# Patient Record
Sex: MALE | Race: OTHER | Employment: FULL TIME | URBAN - METROPOLITAN AREA
[De-identification: names, ages, dates, MRNs, and addresses within clinical notes are randomized per-mention and may not be internally consistent; named-entity substitution may affect disease eponyms.]

---

## 2020-07-20 ENCOUNTER — HOSPITAL ENCOUNTER (EMERGENCY)
Age: 70
Discharge: HOME | End: 2020-07-20
Attending: EMERGENCY MEDICINE | Admitting: EMERGENCY MEDICINE
Payer: MEDICARE

## 2020-07-20 ENCOUNTER — APPOINTMENT (OUTPATIENT)
Dept: GENERAL RADIOLOGY | Age: 70
End: 2020-07-20
Attending: PHYSICIAN ASSISTANT
Payer: MEDICARE

## 2020-07-20 VITALS
SYSTOLIC BLOOD PRESSURE: 127 MMHG | HEART RATE: 101 BPM | OXYGEN SATURATION: 97 % | DIASTOLIC BLOOD PRESSURE: 86 MMHG | RESPIRATION RATE: 15 BRPM | TEMPERATURE: 97.7 F

## 2020-07-20 DIAGNOSIS — S43.401A SPRAIN OF RIGHT SHOULDER, UNSPECIFIED SHOULDER SPRAIN TYPE, INITIAL ENCOUNTER: Primary | ICD-10-CM

## 2020-07-20 PROCEDURE — 99282 EMERGENCY DEPT VISIT SF MDM: CPT

## 2020-07-20 PROCEDURE — 73030 X-RAY EXAM OF SHOULDER: CPT

## 2020-07-20 RX ORDER — LIDOCAINE 50 MG/G
PATCH TOPICAL
Qty: 15 EACH | Refills: 0 | Status: SHIPPED | OUTPATIENT
Start: 2020-07-20 | End: 2020-09-22
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
## 2020-07-20 NOTE — DISCHARGE INSTRUCTIONS
Patient Education     Patient Education        Shoulder Sprain: Care Instructions  Your Care Instructions     A shoulder sprain occurs when you stretch or tear a ligament in your shoulder. Ligaments are tough tissues that connect one bone to another. A sprain can happen during sports, a fall, or projects around the house. Shoulder sprains usually get better with treatment at home. Follow-up care is a key part of your treatment and safety. Be sure to make and go to all appointments, and call your doctor if you are having problems. It's also a good idea to know your test results and keep a list of the medicines you take. How can you care for yourself at home? · Rest and protect your shoulder. Try to stop or reduce any action that causes pain. · If your doctor gave you a sling or immobilizer, wear it as directed. A sling or immobilizer supports your shoulder and may make you more comfortable. · Put ice or a cold pack on your shoulder for 10 to 20 minutes at a time. Try to do this every 1 to 2 hours for the next 3 days (when you are awake) or until the swelling goes down. Put a thin cloth between the ice and your skin. Some doctors suggest alternating between hot and cold. · Be safe with medicines. Read and follow all instructions on the label. ? If the doctor gave you a prescription medicine for pain, take it as prescribed. ? If you are not taking a prescription pain medicine, ask your doctor if you can take an over-the-counter medicine. · For the first day or two after an injury, avoid things that might increase swelling, such as hot showers, hot tubs, or hot packs. · After 2 or 3 days, if your swelling is gone, apply a heating pad set on low or a warm cloth to your shoulder. This helps keep your shoulder flexible. Some doctors suggest that you go back and forth between hot and cold. Put a thin cloth between the heating pad and your skin.   · Follow your doctor's or physical therapist's directions for exercises. · Return to your usual level of activity slowly. When should you call for help? Call your doctor now or seek immediate medical care if:  · Your pain is worse. · You cannot move your shoulder. · Your arm is cool or pale or changes color below the shoulder. · You have tingling, weakness, or numbness in your arm. Watch closely for changes in your health, and be sure to contact your doctor if:  · You do not get better as expected. Where can you learn more? Go to http://de-fabiana.info/  Enter P678 in the search box to learn more about \"Shoulder Sprain: Care Instructions. \"  Current as of: March 2, 2020               Content Version: 12.5  © 6389-9433 [employer]. Care instructions adapted under license by [employer] (which disclaims liability or warranty for this information). If you have questions about a medical condition or this instruction, always ask your healthcare professional. [address] any warranty or liability for your use of this information.
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
## 2020-09-02 ENCOUNTER — APPOINTMENT (OUTPATIENT)
Dept: GENERAL RADIOLOGY | Age: 70
DRG: 281 | End: 2020-09-02
Attending: EMERGENCY MEDICINE
Payer: MEDICARE

## 2020-09-02 ENCOUNTER — HOSPITAL ENCOUNTER (INPATIENT)
Age: 70
LOS: 1 days | Discharge: HOME | DRG: 281 | End: 2020-09-04
Attending: EMERGENCY MEDICINE | Admitting: INTERNAL MEDICINE
Payer: MEDICARE

## 2020-09-02 DIAGNOSIS — I21.4 NSTEMI (NON-ST ELEVATED MYOCARDIAL INFARCTION) (HCC): Primary | ICD-10-CM

## 2020-09-02 PROBLEM — R77.8 ELEVATED TROPONIN: Status: ACTIVE | Noted: 2020-09-02

## 2020-09-02 LAB
ALBUMIN SERPL-MCNC: 3.6 G/DL (ref 3.4–5)
ALBUMIN/GLOB SERPL: 1.1 {RATIO} (ref 0.8–1.7)
ALP SERPL-CCNC: 66 U/L (ref 45–117)
ALT SERPL-CCNC: 31 U/L (ref 16–61)
ANION GAP SERPL CALC-SCNC: 5 MMOL/L (ref 3–18)
AST SERPL-CCNC: 17 U/L (ref 10–38)
BASOPHILS # BLD: 0 K/UL (ref 0–0.1)
BASOPHILS NFR BLD: 1 % (ref 0–2)
BILIRUB SERPL-MCNC: 0.4 MG/DL (ref 0.2–1)
BNP SERPL-MCNC: 61 PG/ML (ref 0–900)
BUN SERPL-MCNC: 18 MG/DL (ref 7–18)
BUN/CREAT SERPL: 16 (ref 12–20)
CALCIUM SERPL-MCNC: 8.5 MG/DL (ref 8.5–10.1)
CHLORIDE SERPL-SCNC: 107 MMOL/L (ref 100–111)
CO2 SERPL-SCNC: 28 MMOL/L (ref 21–32)
CREAT SERPL-MCNC: 1.13 MG/DL (ref 0.6–1.3)
DIFFERENTIAL METHOD BLD: ABNORMAL
EOSINOPHIL # BLD: 0.2 K/UL (ref 0–0.4)
EOSINOPHIL NFR BLD: 5 % (ref 0–5)
ERYTHROCYTE [DISTWIDTH] IN BLOOD BY AUTOMATED COUNT: 13.5 % (ref 11.6–14.5)
GLOBULIN SER CALC-MCNC: 3.2 G/DL (ref 2–4)
GLUCOSE SERPL-MCNC: 180 MG/DL (ref 74–99)
HCT VFR BLD AUTO: 41 % (ref 36–48)
HGB BLD-MCNC: 13.8 G/DL (ref 13–16)
LIPASE SERPL-CCNC: 159 U/L (ref 73–393)
LYMPHOCYTES # BLD: 1.5 K/UL (ref 0.9–3.6)
LYMPHOCYTES NFR BLD: 30 % (ref 21–52)
MAGNESIUM SERPL-MCNC: 2.2 MG/DL (ref 1.6–2.6)
MCH RBC QN AUTO: 31.8 PG (ref 24–34)
MCHC RBC AUTO-ENTMCNC: 33.7 G/DL (ref 31–37)
MCV RBC AUTO: 94.5 FL (ref 74–97)
MONOCYTES # BLD: 0.4 K/UL (ref 0.05–1.2)
MONOCYTES NFR BLD: 7 % (ref 3–10)
NEUTS SEG # BLD: 2.9 K/UL (ref 1.8–8)
NEUTS SEG NFR BLD: 57 % (ref 40–73)
PLATELET # BLD AUTO: 156 K/UL (ref 135–420)
PMV BLD AUTO: 9.5 FL (ref 9.2–11.8)
POTASSIUM SERPL-SCNC: 3.9 MMOL/L (ref 3.5–5.5)
PROT SERPL-MCNC: 6.8 G/DL (ref 6.4–8.2)
RBC # BLD AUTO: 4.34 M/UL (ref 4.7–5.5)
SODIUM SERPL-SCNC: 140 MMOL/L (ref 136–145)
TROPONIN I SERPL-MCNC: 0.05 NG/ML (ref 0–0.04)
TROPONIN I SERPL-MCNC: 0.21 NG/ML (ref 0–0.04)
TROPONIN I SERPL-MCNC: <0.02 NG/ML (ref 0–0.04)
WBC # BLD AUTO: 5 K/UL (ref 4.6–13.2)

## 2020-09-02 PROCEDURE — 99285 EMERGENCY DEPT VISIT HI MDM: CPT

## 2020-09-02 PROCEDURE — 93005 ELECTROCARDIOGRAM TRACING: CPT

## 2020-09-02 PROCEDURE — 83690 ASSAY OF LIPASE: CPT

## 2020-09-02 PROCEDURE — 99218 HC RM OBSERVATION: CPT

## 2020-09-02 PROCEDURE — 71045 X-RAY EXAM CHEST 1 VIEW: CPT

## 2020-09-02 PROCEDURE — 74011250636 HC RX REV CODE- 250/636: Performed by: EMERGENCY MEDICINE

## 2020-09-02 PROCEDURE — 77030021352 HC CBL LD SYS DISP COVD -B

## 2020-09-02 PROCEDURE — 85025 COMPLETE CBC W/AUTO DIFF WBC: CPT

## 2020-09-02 PROCEDURE — 83880 ASSAY OF NATRIURETIC PEPTIDE: CPT

## 2020-09-02 PROCEDURE — 83735 ASSAY OF MAGNESIUM: CPT

## 2020-09-02 PROCEDURE — 74011250637 HC RX REV CODE- 250/637: Performed by: EMERGENCY MEDICINE

## 2020-09-02 PROCEDURE — 80053 COMPREHEN METABOLIC PANEL: CPT

## 2020-09-02 PROCEDURE — 96372 THER/PROPH/DIAG INJ SC/IM: CPT

## 2020-09-02 PROCEDURE — 84484 ASSAY OF TROPONIN QUANT: CPT

## 2020-09-02 RX ORDER — ACETAMINOPHEN 325 MG/1
650 TABLET ORAL
Status: DISCONTINUED | OUTPATIENT
Start: 2020-09-02 | End: 2020-09-04 | Stop reason: HOSPADM

## 2020-09-02 RX ORDER — ASPIRIN 325 MG
325 TABLET ORAL
Status: COMPLETED | OUTPATIENT
Start: 2020-09-02 | End: 2020-09-02

## 2020-09-02 RX ORDER — ONDANSETRON 2 MG/ML
4 INJECTION INTRAMUSCULAR; INTRAVENOUS
Status: DISCONTINUED | OUTPATIENT
Start: 2020-09-02 | End: 2020-09-04 | Stop reason: HOSPADM

## 2020-09-02 RX ORDER — LANOLIN ALCOHOL/MO/W.PET/CERES
12 CREAM (GRAM) TOPICAL
Status: DISCONTINUED | OUTPATIENT
Start: 2020-09-02 | End: 2020-09-04 | Stop reason: HOSPADM

## 2020-09-02 RX ORDER — SODIUM CHLORIDE 0.9 % (FLUSH) 0.9 %
5-40 SYRINGE (ML) INJECTION AS NEEDED
Status: DISCONTINUED | OUTPATIENT
Start: 2020-09-02 | End: 2020-09-04 | Stop reason: HOSPADM

## 2020-09-02 RX ORDER — MAGNESIUM SULFATE 100 %
4 CRYSTALS MISCELLANEOUS AS NEEDED
Status: DISCONTINUED | OUTPATIENT
Start: 2020-09-02 | End: 2020-09-04 | Stop reason: HOSPADM

## 2020-09-02 RX ORDER — LISINOPRIL 5 MG/1
5 TABLET ORAL DAILY
Status: DISCONTINUED | OUTPATIENT
Start: 2020-09-03 | End: 2020-09-03

## 2020-09-02 RX ORDER — INSULIN LISPRO 100 [IU]/ML
INJECTION, SOLUTION INTRAVENOUS; SUBCUTANEOUS
Status: DISCONTINUED | OUTPATIENT
Start: 2020-09-03 | End: 2020-09-04 | Stop reason: HOSPADM

## 2020-09-02 RX ORDER — IPRATROPIUM BROMIDE AND ALBUTEROL SULFATE 2.5; .5 MG/3ML; MG/3ML
3 SOLUTION RESPIRATORY (INHALATION)
Status: DISCONTINUED | OUTPATIENT
Start: 2020-09-02 | End: 2020-09-04 | Stop reason: HOSPADM

## 2020-09-02 RX ORDER — ATORVASTATIN CALCIUM 20 MG/1
80 TABLET, FILM COATED ORAL
Status: DISCONTINUED | OUTPATIENT
Start: 2020-09-02 | End: 2020-09-04 | Stop reason: HOSPADM

## 2020-09-02 RX ORDER — PANTOPRAZOLE SODIUM 40 MG/10ML
40 INJECTION, POWDER, LYOPHILIZED, FOR SOLUTION INTRAVENOUS DAILY PRN
Status: DISCONTINUED | OUTPATIENT
Start: 2020-09-02 | End: 2020-09-04 | Stop reason: HOSPADM

## 2020-09-02 RX ORDER — METOPROLOL TARTRATE 25 MG/1
12.5 TABLET, FILM COATED ORAL ONCE
Status: DISPENSED | OUTPATIENT
Start: 2020-09-02 | End: 2020-09-03

## 2020-09-02 RX ORDER — DOCUSATE SODIUM 100 MG/1
100 CAPSULE, LIQUID FILLED ORAL
Status: DISCONTINUED | OUTPATIENT
Start: 2020-09-02 | End: 2020-09-04 | Stop reason: HOSPADM

## 2020-09-02 RX ORDER — ENOXAPARIN SODIUM 100 MG/ML
1 INJECTION SUBCUTANEOUS
Status: COMPLETED | OUTPATIENT
Start: 2020-09-02 | End: 2020-09-02

## 2020-09-02 RX ORDER — DEXTROSE MONOHYDRATE 25 G/50ML
25-50 INJECTION, SOLUTION INTRAVENOUS AS NEEDED
Status: DISCONTINUED | OUTPATIENT
Start: 2020-09-02 | End: 2020-09-04 | Stop reason: HOSPADM

## 2020-09-02 RX ORDER — GUAIFENESIN 100 MG/5ML
81 LIQUID (ML) ORAL DAILY
Status: DISCONTINUED | OUTPATIENT
Start: 2020-09-03 | End: 2020-09-04 | Stop reason: HOSPADM

## 2020-09-02 RX ORDER — SODIUM CHLORIDE 0.9 % (FLUSH) 0.9 %
5-40 SYRINGE (ML) INJECTION EVERY 8 HOURS
Status: DISCONTINUED | OUTPATIENT
Start: 2020-09-02 | End: 2020-09-04 | Stop reason: HOSPADM

## 2020-09-02 RX ORDER — LISINOPRIL AND HYDROCHLOROTHIAZIDE 20; 25 MG/1; MG/1
1 TABLET ORAL DAILY
COMMUNITY
End: 2020-09-22

## 2020-09-02 RX ADMIN — ENOXAPARIN SODIUM 80 MG: 80 INJECTION SUBCUTANEOUS at 18:30

## 2020-09-02 RX ADMIN — Medication 10 ML: at 23:09

## 2020-09-02 RX ADMIN — ASPIRIN 325 MG ORAL TABLET 325 MG: 325 PILL ORAL at 18:30

## 2020-09-02 NOTE — Clinical Note
TRANSFER - IN REPORT:     Verbal report received from: N/A. Report consisted of patient's Situation, Background, Assessment and   Recommendations(SBAR). Opportunity for questions and clarification was provided. Assessment completed upon patient's arrival to unit and care assumed. Patient transported with a Cardiac Cath Tech / Patient Care Tech.

## 2020-09-02 NOTE — ED TRIAGE NOTES
Pt arrived complaining of chest pain and clamminess that started 20 mins prior to arrival. Pt denies nay shortness of breath. Pt also complains of right shoulder uvaldo.

## 2020-09-02 NOTE — Clinical Note
Contrast Dose Calculator:   Patient's age: 72.   Patient's sex: Male. Patient weight (kg) = 73.9. Creatinine level (mg/dL) = 0.9. Creatinine clearance (mL/min): 80.97. Contrast concentration (mg/mL) = 300. MACD = 300 mL. Max Contrast dose per Creatinine Cl calculator = 182.18 mL.

## 2020-09-02 NOTE — ED NOTES
Pt ambulated to bathroom. Pt has been informed of plan of care which includes being admitted. Pt given phone to call significant other. No complaints at this time.

## 2020-09-02 NOTE — Clinical Note
TRANSFER - OUT REPORT:     Verbal report given to: Adolfo Prado PACU RN. Report consisted of patient's Situation, Background, Assessment and   Recommendations(SBAR). Opportunity for questions and clarification was provided. Patient transported with a Cardiac Cath Tech / Patient Care Tech. Patient transported to: PACU.

## 2020-09-02 NOTE — ED PROVIDER NOTES
[address] [facility]  EMERGENCY DEPARTMENT HISTORY AND PHYSICAL EXAM       Date: 9/2/2020   Patient Name: Adam Mckee   YOB: 1950  Medical Record Number: [id number]    HISTORY OF PRESENTING ILLNESS:     Adam Mckee is a 72 y.o. male presenting with the noted PMH to the ED c/o left-sided chest pain. Scribes as being aching sensation. Lasting for about 20 minutes. Has completely resolved now. Associated with diaphoresis and nausea. Which is also resolved. He states he was walking when it occurred. He states he has history of anxiety and takes BuSpar. He states he feels similar. Has not seen a heart doctor before. Has not had a stress test before. Denies any cough or cold symptoms. Denies any fevers or chills. Denies abdominal pain. Denies any urine or bowel changes. He states he has had diarrhea off and on for the last month but takes Imodium and it goes away. Rest of systems reviewed are negative. Denies any recent travel or sick contacts. He states his father and uncle passed away in their mid 76s from CHF. No other family history. Primary Care Provider: None   Specialist:    Past Medical History:   Past Medical History:   Diagnosis Date    Hypertension         Past Surgical History:   History reviewed. No pertinent surgical history. Social History:   Social History     Tobacco Use    Smoking status: Never Smoker    Smokeless tobacco: Never Used   Substance Use Topics    Alcohol use: Yes     Alcohol/week: 20.0 standard drinks     Types: 20 Shots of liquor per week    Drug use: Not on file        Allergies:   No Known Allergies     REVIEW OF SYSTEMS:  Review of Systems      PHYSICAL EXAM:  Vitals:    09/02/20 1700 09/02/20 1715 09/02/20 1730 09/02/20 1745   BP: 129/70 107/70 113/71 127/67   Pulse: 82 79 79 78   Resp: 17 15 16 13   Temp:       SpO2: 97% 97% 97% 97%       Physical Exam   Vital signs reviewed. Alert oriented x 3 in NAD.   HEENT: normocephalic atraumatic. Eyes are PERRLA EOMI. Conjunctiva normal.    External ears and nose normal.    Neck: normal external exam. No midline neck or back TTP. Lungs are clear to ascultation bilaterally. normal effort  Heart is regular rate and rhythm with MARIAM murmur 2/6. Abdomen soft and nontender. No rebound rigidity or guarding. Extremities: Moves all 4 extremities and no distress. Full range of motion. 2+ pulses and BCR in all 4 extremities. No edema or calf tenderness. Neuro: Normal gait. 5 out of 5 strength in all 4 extremities. No facial droop. Skin examination: intact. no rashes. No petechia or purpura. Medications   aspirin tablet 325 mg (has no administration in time range)   enoxaparin (LOVENOX) injection 80 mg (has no administration in time range)       RESULTS:    Labs -   Labs Reviewed   CBC WITH AUTOMATED DIFF - Abnormal; Notable for the following components:       Result Value    RBC 4.34 (*)     All other components within normal limits   METABOLIC PANEL, COMPREHENSIVE - Abnormal; Notable for the following components:    Glucose 180 (*)     All other components within normal limits   TROPONIN I - Abnormal; Notable for the following components:    Troponin-I, QT 0.05 (*)     All other components within normal limits   NT-PRO BNP   TROPONIN I   LIPASE   MAGNESIUM       Radiologic Studies -  Xr Chest Port    Result Date: 9/2/2020  EXAM: XR CHEST PORT CLINICAL INDICATION/HISTORY: pain -Additional: None COMPARISON: 03/11/2009 TECHNIQUE: Frontal view of the chest _______________ FINDINGS: HEART AND MEDIASTINUM: Midline cardiac silhouette, normal in size. Unremarkable hilar vascular structures. LUNGS AND PLEURAL SPACES: No focal consolidation, parenchymal opacity. No pneumothorax or pleural effusion. BONY THORAX AND SOFT TISSUES: No acute or destructive osseous abnormality. _______________     IMPRESSION: 1. No acute cardiopulmonary process.        EKG interpretation:   Done at 1414 for myself is normal sinus rhythm at 100 bpm.  Right bundle branch block. No ST elevation. MEDICAL DECISION MAKING    Patient has been chest pain-free however has only been 4 hours since pain onset. Patient swelling elevation of troponin here. Patient with a heart score of 4. Moderate risk. With elevated troponins. Will admit patient for further evaluation and care. Patient also has a heart murmur. Has not been told that before. Calling cardiology to see patient as well. Patient agrees with plan. No evidence of pneumonia or pneumothorax. No evidence of dissection. No evidence of DVT or PE. No leg pain or swelling. Will admit for further evaluation and care.    1530. Patient reassessed. States he still pain-free. States he still thinks this is anxiety. He states his been having a little since he was 40years old. He states been having it more frequently recently. Heart score of 4. Moderate risk. Patient declines coming into the hospital here but does agree to getting a second troponin. 1759. Patient reassessed. Still pain-free. However patient's troponin did slightly elevate. Has only been 4 hours since his pain occurred. Patient denies any bleeding history. No evidence or history of hematochezia hematemesis hematuria hemoptysis or intracranial bleed. Agrees to being admitted to the hospital here.    1800. Paging hospitalist and cardiology. Diagnosis   Clinical Impression:   1. NSTEMI (non-ST elevated myocardial infarction) ([facility] [address]    Hypertension  Hyperlipidemia  Previous tobacco history  Occasional marijuana use      Admitted in stable and improved condition. This chart was completed using Dragon, a dictation transcription service. Errors may have resulted from using this device.      Critical Care Time:  The services I provided to this patient were to treat and/or prevent clinically significant deterioration that could result in the failure of one or more body systems and/or organ systems. Services included the following:  -reviewing nursing notes and old charts  -vital sign assessments  -direct patient care  -medication orders and management  -interpreting and reviewing diagnostic studies/labs  -re-evaluations  -documentation time    Aggregate critical care time was 37 minutes, which includes only time during which I was engaged in work directly related to the patient's care as described above, whether I was at bedside or elsewhere in the Emergency Department. It did not include time spent performing other reported procedures or the services of residents, students, nurses, or advance practice providers.

## 2020-09-02 NOTE — ED NOTES
Bedside and Verbal shift change report given to Leesa Villalobos (oncoming nurse) by Doreen Valera (offgoing nurse). Report included the following information SBAR, ED Summary, MAR, Recent Results and Cardiac Rhythm NSR.

## 2020-09-03 ENCOUNTER — APPOINTMENT (OUTPATIENT)
Dept: NON INVASIVE DIAGNOSTICS | Age: 70
DRG: 281 | End: 2020-09-03
Attending: INTERNAL MEDICINE
Payer: MEDICARE

## 2020-09-03 ENCOUNTER — APPOINTMENT (OUTPATIENT)
Dept: NON INVASIVE DIAGNOSTICS | Age: 70
DRG: 281 | End: 2020-09-03
Attending: HOSPITALIST
Payer: MEDICARE

## 2020-09-03 ENCOUNTER — HOSPITAL ENCOUNTER (OUTPATIENT)
Dept: NUCLEAR MEDICINE | Age: 70
Discharge: HOME | DRG: 281 | End: 2020-09-03
Attending: HOSPITALIST
Payer: MEDICARE

## 2020-09-03 PROBLEM — F41.1 GENERALIZED ANXIETY DISORDER WITH PANIC ATTACKS: Status: ACTIVE | Noted: 2020-09-03

## 2020-09-03 PROBLEM — I10 HTN (HYPERTENSION): Status: ACTIVE | Noted: 2020-09-03

## 2020-09-03 PROBLEM — E11.9 TYPE II DIABETES MELLITUS (HCC): Status: ACTIVE | Noted: 2020-09-03

## 2020-09-03 PROBLEM — Z87.891 FORMER SMOKER: Status: ACTIVE | Noted: 2020-09-03

## 2020-09-03 PROBLEM — F10.10 ETOH ABUSE: Status: ACTIVE | Noted: 2020-09-03

## 2020-09-03 PROBLEM — G47.00 INSOMNIA: Status: ACTIVE | Noted: 2020-09-03

## 2020-09-03 PROBLEM — F41.0 GENERALIZED ANXIETY DISORDER WITH PANIC ATTACKS: Status: ACTIVE | Noted: 2020-09-03

## 2020-09-03 PROBLEM — E78.5 HLD (HYPERLIPIDEMIA): Status: ACTIVE | Noted: 2020-09-03

## 2020-09-03 PROBLEM — J45.909 ASTHMA: Status: ACTIVE | Noted: 2020-09-03

## 2020-09-03 PROBLEM — Z78.9 STATIN INTOLERANCE: Status: ACTIVE | Noted: 2020-09-03

## 2020-09-03 LAB
ALBUMIN SERPL-MCNC: 3.6 G/DL (ref 3.4–5)
ALBUMIN/GLOB SERPL: 1.3 {RATIO} (ref 0.8–1.7)
ALP SERPL-CCNC: 63 U/L (ref 45–117)
ALT SERPL-CCNC: 28 U/L (ref 16–61)
ANION GAP SERPL CALC-SCNC: 3 MMOL/L (ref 3–18)
AST SERPL-CCNC: 18 U/L (ref 10–38)
BASOPHILS # BLD: 0 K/UL (ref 0–0.1)
BASOPHILS NFR BLD: 1 % (ref 0–2)
BILIRUB SERPL-MCNC: 0.5 MG/DL (ref 0.2–1)
BUN SERPL-MCNC: 17 MG/DL (ref 7–18)
BUN/CREAT SERPL: 19 (ref 12–20)
CALCIUM SERPL-MCNC: 8.4 MG/DL (ref 8.5–10.1)
CHLORIDE SERPL-SCNC: 107 MMOL/L (ref 100–111)
CHOLEST SERPL-MCNC: 192 MG/DL
CO2 SERPL-SCNC: 31 MMOL/L (ref 21–32)
CREAT SERPL-MCNC: 0.9 MG/DL (ref 0.6–1.3)
DIFFERENTIAL METHOD BLD: ABNORMAL
ECHO AO ASC DIAM: 3.38 CM
ECHO AO ROOT DIAM: 3.69 CM
ECHO AV AREA PEAK VELOCITY: 2.23 CM2
ECHO AV AREA VTI: 2.08 CM2
ECHO AV AREA/BSA PEAK VELOCITY: 1.2 CM2/M2
ECHO AV AREA/BSA VTI: 1.1 CM2/M2
ECHO AV MEAN GRADIENT: 5.48 MMHG
ECHO AV PEAK GRADIENT: 9.37 MMHG
ECHO AV PEAK VELOCITY: 153.06 CM/S
ECHO AV VTI: 31.04 CM
ECHO IVC PROX: 2.2 CM
ECHO LA MAJOR AXIS: 2.97 CM
ECHO LA MINOR AXIS: 1.61 CM
ECHO LV EDV A2C: 80.12 ML
ECHO LV EDV A4C: 57.18 ML
ECHO LV EDV BP: 69.58 ML (ref 67–155)
ECHO LV EDV INDEX A4C: 31.1 ML/M2
ECHO LV EDV INDEX BP: 37.8 ML/M2
ECHO LV EDV NDEX A2C: 43.6 ML/M2
ECHO LV EJECTION FRACTION A2C: 61 PERCENT
ECHO LV EJECTION FRACTION A4C: 67 PERCENT
ECHO LV EJECTION FRACTION BIPLANE: 64.4 PERCENT (ref 55–100)
ECHO LV ESV A2C: 31.01 ML
ECHO LV ESV A4C: 19.08 ML
ECHO LV ESV BP: 24.77 ML (ref 22–58)
ECHO LV ESV INDEX A2C: 16.9 ML/M2
ECHO LV ESV INDEX A4C: 10.4 ML/M2
ECHO LV ESV INDEX BP: 13.5 ML/M2
ECHO LV INTERNAL DIMENSION DIASTOLIC: 4.11 CM (ref 4.2–5.9)
ECHO LV INTERNAL DIMENSION SYSTOLIC: 3.67 CM
ECHO LV IVSD: 1.05 CM (ref 0.6–1)
ECHO LV MASS 2D: 129.8 G (ref 88–224)
ECHO LV MASS INDEX 2D: 70.6 G/M2 (ref 49–115)
ECHO LV POSTERIOR WALL DIASTOLIC: 0.92 CM (ref 0.6–1)
ECHO LVOT CARDIAC OUTPUT: 13.4 LITER/MINUTE
ECHO LVOT DIAM: 1.98 CM
ECHO LVOT PEAK GRADIENT: 4.87 MMHG
ECHO LVOT PEAK VELOCITY: 110.32 CM/S
ECHO LVOT SV: 64.4 ML
ECHO LVOT VTI: 20.84 CM
ECHO MV A VELOCITY: 85.65 CM/S
ECHO MV E DECELERATION TIME (DT): 0.24 S
ECHO MV E VELOCITY: 68.9 CM/S
ECHO MV E/A RATIO: 0.8
ECHO TV REGURGITANT MAX VELOCITY: 213.79 CM/S
ECHO TV REGURGITANT PEAK GRADIENT: 18.28 MMHG
EOSINOPHIL # BLD: 0.2 K/UL (ref 0–0.4)
EOSINOPHIL NFR BLD: 4 % (ref 0–5)
ERYTHROCYTE [DISTWIDTH] IN BLOOD BY AUTOMATED COUNT: 13.5 % (ref 11.6–14.5)
EST. AVERAGE GLUCOSE BLD GHB EST-MCNC: 157 MG/DL
GLOBULIN SER CALC-MCNC: 2.8 G/DL (ref 2–4)
GLUCOSE BLD STRIP.AUTO-MCNC: 103 MG/DL (ref 70–110)
GLUCOSE BLD STRIP.AUTO-MCNC: 159 MG/DL (ref 70–110)
GLUCOSE BLD STRIP.AUTO-MCNC: 180 MG/DL (ref 70–110)
GLUCOSE SERPL-MCNC: 96 MG/DL (ref 74–99)
HBA1C MFR BLD: 7.1 % (ref 4.2–5.6)
HCT VFR BLD AUTO: 40.5 % (ref 36–48)
HDLC SERPL-MCNC: 56 MG/DL (ref 40–60)
HDLC SERPL: 3.4 {RATIO} (ref 0–5)
HGB BLD-MCNC: 13.6 G/DL (ref 13–16)
LDLC SERPL CALC-MCNC: 86.2 MG/DL (ref 0–100)
LIPID PROFILE,FLP: ABNORMAL
LVOT MG: 2.34 MMHG
LYMPHOCYTES # BLD: 1.4 K/UL (ref 0.9–3.6)
LYMPHOCYTES NFR BLD: 26 % (ref 21–52)
MCH RBC QN AUTO: 32 PG (ref 24–34)
MCHC RBC AUTO-ENTMCNC: 33.6 G/DL (ref 31–37)
MCV RBC AUTO: 95.3 FL (ref 74–97)
MONOCYTES # BLD: 0.5 K/UL (ref 0.05–1.2)
MONOCYTES NFR BLD: 9 % (ref 3–10)
NEUTS SEG # BLD: 3.3 K/UL (ref 1.8–8)
NEUTS SEG NFR BLD: 60 % (ref 40–73)
PLATELET # BLD AUTO: 155 K/UL (ref 135–420)
PMV BLD AUTO: 10 FL (ref 9.2–11.8)
POTASSIUM SERPL-SCNC: 3.9 MMOL/L (ref 3.5–5.5)
PROT SERPL-MCNC: 6.4 G/DL (ref 6.4–8.2)
RBC # BLD AUTO: 4.25 M/UL (ref 4.7–5.5)
SODIUM SERPL-SCNC: 141 MMOL/L (ref 136–145)
STRESS BASELINE HR: 68 BPM
STRESS ESTIMATED WORKLOAD: 1 METS
STRESS EXERCISE DUR MIN: NORMAL
STRESS PEAK DIAS BP: 106 MMHG
STRESS PEAK SYS BP: 160 MMHG
STRESS PERCENT HR ACHIEVED: 80 %
STRESS POST PEAK HR: 121 BPM
STRESS RATE PRESSURE PRODUCT: NORMAL BPM*MMHG
STRESS TARGET HR: 151 BPM
TRIGL SERPL-MCNC: 249 MG/DL (ref ?–150)
TROPONIN I SERPL-MCNC: 0.12 NG/ML (ref 0–0.04)
TSH SERPL DL<=0.05 MIU/L-ACNC: 1.23 UIU/ML (ref 0.36–3.74)
VLDLC SERPL CALC-MCNC: 49.8 MG/DL
WBC # BLD AUTO: 5.4 K/UL (ref 4.6–13.2)

## 2020-09-03 PROCEDURE — 99218 HC RM OBSERVATION: CPT

## 2020-09-03 PROCEDURE — 82962 GLUCOSE BLOOD TEST: CPT

## 2020-09-03 PROCEDURE — 74011250637 HC RX REV CODE- 250/637: Performed by: INTERNAL MEDICINE

## 2020-09-03 PROCEDURE — 80053 COMPREHEN METABOLIC PANEL: CPT

## 2020-09-03 PROCEDURE — 74011250636 HC RX REV CODE- 250/636: Performed by: HOSPITALIST

## 2020-09-03 PROCEDURE — 74011000250 HC RX REV CODE- 250: Performed by: HOSPITALIST

## 2020-09-03 PROCEDURE — 85025 COMPLETE CBC W/AUTO DIFF WBC: CPT

## 2020-09-03 PROCEDURE — 84484 ASSAY OF TROPONIN QUANT: CPT

## 2020-09-03 PROCEDURE — 93017 CV STRESS TEST TRACING ONLY: CPT

## 2020-09-03 PROCEDURE — A9500 TC99M SESTAMIBI: HCPCS

## 2020-09-03 PROCEDURE — 83036 HEMOGLOBIN GLYCOSYLATED A1C: CPT

## 2020-09-03 PROCEDURE — 96374 THER/PROPH/DIAG INJ IV PUSH: CPT

## 2020-09-03 PROCEDURE — 36415 COLL VENOUS BLD VENIPUNCTURE: CPT

## 2020-09-03 PROCEDURE — 80061 LIPID PANEL: CPT

## 2020-09-03 PROCEDURE — 74011250636 HC RX REV CODE- 250/636: Performed by: INTERNAL MEDICINE

## 2020-09-03 PROCEDURE — C8929 TTE W OR WO FOL WCON,DOPPLER: HCPCS

## 2020-09-03 PROCEDURE — 84443 ASSAY THYROID STIM HORMONE: CPT

## 2020-09-03 PROCEDURE — 74011250637 HC RX REV CODE- 250/637: Performed by: HOSPITALIST

## 2020-09-03 RX ORDER — TRAMADOL HYDROCHLORIDE 50 MG/1
50 TABLET ORAL
Status: DISCONTINUED | OUTPATIENT
Start: 2020-09-03 | End: 2020-09-04 | Stop reason: HOSPADM

## 2020-09-03 RX ORDER — BUSPIRONE HYDROCHLORIDE 7.5 MG/1
15 TABLET ORAL 2 TIMES DAILY
Status: DISCONTINUED | OUTPATIENT
Start: 2020-09-03 | End: 2020-09-04 | Stop reason: HOSPADM

## 2020-09-03 RX ORDER — MAG HYDROX/ALUMINUM HYD/SIMETH 200-200-20
30 SUSPENSION, ORAL (FINAL DOSE FORM) ORAL
Status: DISCONTINUED | OUTPATIENT
Start: 2020-09-03 | End: 2020-09-04 | Stop reason: HOSPADM

## 2020-09-03 RX ORDER — ONDANSETRON 2 MG/ML
4 INJECTION INTRAMUSCULAR; INTRAVENOUS ONCE
Status: COMPLETED | OUTPATIENT
Start: 2020-09-03 | End: 2020-09-03

## 2020-09-03 RX ORDER — HYDROCHLOROTHIAZIDE 25 MG/1
25 TABLET ORAL DAILY
Status: DISCONTINUED | OUTPATIENT
Start: 2020-09-03 | End: 2020-09-04

## 2020-09-03 RX ORDER — TRAMADOL HYDROCHLORIDE 50 MG/1
50 TABLET ORAL
Status: COMPLETED | OUTPATIENT
Start: 2020-09-03 | End: 2020-09-03

## 2020-09-03 RX ORDER — LISINOPRIL 20 MG/1
20 TABLET ORAL DAILY
Status: DISCONTINUED | OUTPATIENT
Start: 2020-09-03 | End: 2020-09-04

## 2020-09-03 RX ORDER — ZOLPIDEM TARTRATE 5 MG/1
5 TABLET ORAL
Status: DISCONTINUED | OUTPATIENT
Start: 2020-09-03 | End: 2020-09-04 | Stop reason: HOSPADM

## 2020-09-03 RX ADMIN — ZOLPIDEM TARTRATE 5 MG: 5 TABLET ORAL at 22:35

## 2020-09-03 RX ADMIN — BUSPIRONE HYDROCHLORIDE 15 MG: 7.5 TABLET ORAL at 17:26

## 2020-09-03 RX ADMIN — BUSPIRONE HYDROCHLORIDE 15 MG: 7.5 TABLET ORAL at 08:35

## 2020-09-03 RX ADMIN — MELATONIN 12 MG: at 03:27

## 2020-09-03 RX ADMIN — REGADENOSON 0.4 MG: 0.08 INJECTION, SOLUTION INTRAVENOUS at 12:28

## 2020-09-03 RX ADMIN — Medication 10 ML: at 22:36

## 2020-09-03 RX ADMIN — HYDROCHLOROTHIAZIDE 25 MG: 25 TABLET ORAL at 08:35

## 2020-09-03 RX ADMIN — ASPIRIN 81 MG CHEWABLE TABLET 81 MG: 81 TABLET CHEWABLE at 08:35

## 2020-09-03 RX ADMIN — ZOLPIDEM TARTRATE 5 MG: 5 TABLET ORAL at 03:27

## 2020-09-03 RX ADMIN — Medication 10 ML: at 16:06

## 2020-09-03 RX ADMIN — BUSPIRONE HYDROCHLORIDE 15 MG: 7.5 TABLET ORAL at 03:27

## 2020-09-03 RX ADMIN — LISINOPRIL 20 MG: 20 TABLET ORAL at 08:35

## 2020-09-03 RX ADMIN — TRAMADOL HYDROCHLORIDE 50 MG: 50 TABLET, FILM COATED ORAL at 03:27

## 2020-09-03 RX ADMIN — ALUMINUM HYDROXIDE, MAGNESIUM HYDROXIDE, AND SIMETHICONE 30 ML: 200; 200; 20 SUSPENSION ORAL at 16:06

## 2020-09-03 RX ADMIN — ONDANSETRON 4 MG: 2 INJECTION INTRAMUSCULAR; INTRAVENOUS at 11:16

## 2020-09-03 RX ADMIN — Medication 10 ML: at 06:00

## 2020-09-03 RX ADMIN — TRAMADOL HYDROCHLORIDE 50 MG: 50 TABLET, FILM COATED ORAL at 16:06

## 2020-09-03 RX ADMIN — PERFLUTREN 1 ML: 6.52 INJECTION, SUSPENSION INTRAVENOUS at 10:03

## 2020-09-03 NOTE — PROGRESS NOTES
Received patient from [employer]. Pt awake and in bed. Denies pain. Bed locked in lowest position and call light within reach. 0019: Reassessment, No changes    0434 Reassessment, No changes    Uneventful night for pt. Pt rested and was treated per eMAR. No further complaints and no distress noted. Bedside shift change report given to [facility] (oncoming nurse) by Omar Tariq RN (offgoing nurse). Report included the following information SBAR, Intake/Output, MAR and Quality Measures. Opportunity for questions and clarification provided.

## 2020-09-03 NOTE — ED NOTES
TRANSFER - OUT REPORT:    Verbal report given to Mona RN(name) on [facility] Space  being transferred to (unit) for routine progression of care       Report consisted of patients Situation, Background, Assessment and   Recommendations(SBAR). Information from the following report(s) SBAR, MAR and Cardiac Rhythm sr was reviewed with the receiving nurse. Lines:   Peripheral IV 09/02/20 Left Hand (Active)   Site Assessment Clean, dry, & intact 09/02/20 1434   Phlebitis Assessment 0 09/02/20 1434   Infiltration Assessment 0 09/02/20 1434   Dressing Status Clean, dry, & intact 09/02/20 1434   Dressing Type Transparent 09/02/20 1434   Hub Color/Line Status Pink 09/02/20 1434        Opportunity for questions and clarification was provided.       Patient transported with:   Monitor

## 2020-09-03 NOTE — MED STUDENT NOTES
H&P: 
 
Name: Yocasta Wilson : [birth year] [place]: 9/3/20 Assessment/Plan: 1. Chest pain, most likely due to combination of coronary artery disease given his family history and panic-related symptoms; follow serial troponins; recommend echo and stress test to r/o ischemia and assess heart mechanical functions 2. Panic attack; managed by Dr. Susan Aguillon (PCP) with buspar 15 mg PRN 3. Hypertension; managed by Dr. Susan Aguillon with lisinopril-HCTZ 4. Hyperlipidemia; patient is adverse to using statins; recommend fenofibrate to decrease LDL; otherwise managed by PCP 5. Type II Diabetes Mellitus; patient's last A1c=7.6%; patient will use diet and exercise modifications for management 6. Chronic sinusitis; managed by PCP Subjective: 
CC: \"I had a panic attack\" HPI: 
Mr. Olesya Chavis is a 72year old male with a history of panic attacks and hypertension who presented 20 with panic, chest pain, and sweating. The patient was at home with his fiance when he felt a panic attack begin. He has had these attacks several times over his life and has been taking buspirone for prevention. However during this attack he had 4/10 radiating chest pain that originated from the left shoulder and radiating into the center of his chest. He describes the pain as radiating but similar to a pins and needles feeling. He took aspirin 81 mg for relief. He had a sense of doom and that he was dying and decided to go to the ED. Upon 10 minutes of arrival to the ED he was back at baseline and had no pain. He has never had chest pain like this before but the panic attack sensation was similar to others. He denies shortness of breath, headaches, dizziness, or fever ROS: 
Constitutional: no fever, no dizziness HEENT: no headaches, no blurry vision Cardiac: no palpitations, no chest pain, no orthopnea Pulm: no shortness of breath, positive for cough GI: no belly pain, no vomiting, no nausea Psych: no depression, positive for anxiety PMH: 
 Panic attacks Hypertension Hyperlipidemia Type II Diabetes Mellitus Chronic sinusitis Medications: No current facility-administered medications on file prior to encounter. Current Outpatient Medications on File Prior to Encounter Medication Sig Dispense Refill  lisinopril-hydroCHLOROthiazide (Zestoretic) 20-25 mg per tablet Take 1 Tab by mouth daily.  lidocaine (Lidoderm) 5 % Apply patch to the affected area for 12 hours a day and remove for 12 hours a day. 15 Each 0 Buspar 15 mg po, once a day as needed Allergies: 
Statins - nerve pain and cramps PSH: No pertinent surgical history Family history: 
Father, CAD, CHF,  at 68years old Mother, history of CABG and hip surgery Paternal Uncle - heart disease SH: 
Lives with fiance, former smoker - 3-5 years of cigarette smoking half a pack to 3/4 pack a day, 2-3 beers a night, occasional marijuana use Objective: 
GA: alert, tired-appearing, in no acute distress; well-nourished, well-developed VS: /85   Pulse 88   Temp 98.5 °F (36.9 °C)   Resp 20   Ht 5' 7\" (1.702 m)   SpO2 98% Physical Exam: 
HEENT: head atraumatic, normocephalic; PERRLA, EOMI; external auditory canals patent bilaterally and non erythematous; oral mucosa pink and moist; neck supple Cardiac: no cyanosis, no carotid bruits auscultated; no tenderness to palpation of the chest or back; heart regular rate and rhythm with a grade 2/6 blowing crescendo-decrescendo systolic murmur heard best at the LUSB; no rubs or gallops; no pedal edema in bilateral extremities; 2+ radial pulses bilateral and equal 
Pulm: no clubbing of the fingers; lungs clear to auscultation in all lungs fields bilaterally without wheezes, rhonci, or rales GI: normoactive bowel sounds in all 4 quadrants, no tenderness to palpation in all 4 quadrants; no rebound tenderness or guarding Labs: 
Recent Results (from the past 24 hour(s)) EKG, 12 LEAD, INITIAL Collection Time: 09/02/20  2:14 PM  
Result Value Ref Range Ventricular Rate 100 BPM  
 Atrial Rate 100 BPM  
 P-R Interval 228 ms QRS Duration 124 ms Q-T Interval 354 ms QTC Calculation (Bezet) 456 ms Calculated P Axis 59 degrees Calculated R Axis 153 degrees Calculated T Axis 37 degrees Diagnosis Sinus rhythm with 1st degree AV block Right bundle branch block Abnormal ECG When compared with ECG of 11-MAR-2009 20:40, 
WA interval has increased Right bundle branch block has replaced Incomplete right bundle branch block CBC WITH AUTOMATED DIFF Collection Time: 09/02/20  2:34 PM  
Result Value Ref Range WBC 5.0 4.6 - 13.2 K/uL  
 RBC 4.34 (L) 4.70 - 5.50 M/uL  
 HGB 13.8 13.0 - 16.0 g/dL HCT 41.0 36.0 - 48.0 % MCV 94.5 74.0 - 97.0 FL  
 MCH 31.8 24.0 - 34.0 PG  
 MCHC 33.7 31.0 - 37.0 g/dL  
 RDW 13.5 11.6 - 14.5 % PLATELET 753 616 - 528 K/uL MPV 9.5 9.2 - 11.8 FL  
 NEUTROPHILS 57 40 - 73 % LYMPHOCYTES 30 21 - 52 % MONOCYTES 7 3 - 10 % EOSINOPHILS 5 0 - 5 % BASOPHILS 1 0 - 2 %  
 ABS. NEUTROPHILS 2.9 1.8 - 8.0 K/UL  
 ABS. LYMPHOCYTES 1.5 0.9 - 3.6 K/UL  
 ABS. MONOCYTES 0.4 0.05 - 1.2 K/UL  
 ABS. EOSINOPHILS 0.2 0.0 - 0.4 K/UL  
 ABS. BASOPHILS 0.0 0.0 - 0.1 K/UL  
 DF AUTOMATED METABOLIC PANEL, COMPREHENSIVE Collection Time: 09/02/20  2:34 PM  
Result Value Ref Range Sodium 140 136 - 145 mmol/L Potassium 3.9 3.5 - 5.5 mmol/L Chloride 107 100 - 111 mmol/L  
 CO2 28 21 - 32 mmol/L Anion gap 5 3.0 - 18 mmol/L Glucose 180 (H) 74 - 99 mg/dL BUN 18 7.0 - 18 MG/DL Creatinine 1.13 0.6 - 1.3 MG/DL  
 BUN/Creatinine ratio 16 12 - 20 GFR est AA >60 >60 ml/min/1.73m2 GFR est non-AA >60 >60 ml/min/1.73m2 Calcium 8.5 8.5 - 10.1 MG/DL Bilirubin, total 0.4 0.2 - 1.0 MG/DL  
 ALT (SGPT) 31 16 - 61 U/L  
 AST (SGOT) 17 10 - 38 U/L Alk. phosphatase 66 45 - 117 U/L Protein, total 6.8 6.4 - 8.2 g/dL Albumin 3.6 3.4 - 5.0 g/dL Globulin 3.2 2.0 - 4.0 g/dL A-G Ratio 1.1 0.8 - 1.7 NT-PRO BNP Collection Time: 09/02/20  2:34 PM  
Result Value Ref Range NT pro-BNP 61 0 - 900 PG/ML  
TROPONIN I Collection Time: 09/02/20  2:34 PM  
Result Value Ref Range Troponin-I, QT <0.02 0.0 - 0.045 NG/ML  
LIPASE Collection Time: 09/02/20  2:34 PM  
Result Value Ref Range Lipase 159 73 - 393 U/L MAGNESIUM Collection Time: 09/02/20  2:34 PM  
Result Value Ref Range Magnesium 2.2 1.6 - 2.6 mg/dL TROPONIN I Collection Time: 09/02/20  4:30 PM  
Result Value Ref Range Troponin-I, QT 0.05 (H) 0.0 - 0.045 NG/ML  
EKG, 12 LEAD, SUBSEQUENT Collection Time: 09/02/20  5:14 PM  
Result Value Ref Range Ventricular Rate 80 BPM  
 Atrial Rate 80 BPM  
 P-R Interval 224 ms QRS Duration 124 ms Q-T Interval 380 ms QTC Calculation (Bezet) 438 ms Calculated P Axis 50 degrees Calculated R Axis 71 degrees Calculated T Axis 35 degrees Diagnosis Sinus rhythm with 1st degree AV block Right bundle branch block Abnormal ECG When compared with ECG of 02-SEP-2020 14:14, 
QRS axis shifted left TROPONIN I Collection Time: 09/02/20 10:01 PM  
Result Value Ref Range Troponin-I, QT 0.21 (H) 0.0 - 5.571 NG/ML  
METABOLIC PANEL, COMPREHENSIVE Collection Time: 09/03/20  3:50 AM  
Result Value Ref Range Sodium 141 136 - 145 mmol/L Potassium 3.9 3.5 - 5.5 mmol/L Chloride 107 100 - 111 mmol/L  
 CO2 31 21 - 32 mmol/L Anion gap 3 3.0 - 18 mmol/L Glucose 96 74 - 99 mg/dL BUN 17 7.0 - 18 MG/DL Creatinine 0.90 0.6 - 1.3 MG/DL  
 BUN/Creatinine ratio 19 12 - 20 GFR est AA >60 >60 ml/min/1.73m2 GFR est non-AA >60 >60 ml/min/1.73m2 Calcium 8.4 (L) 8.5 - 10.1 MG/DL Bilirubin, total 0.5 0.2 - 1.0 MG/DL  
 ALT (SGPT) 28 16 - 61 U/L  
 AST (SGOT) 18 10 - 38 U/L Alk. phosphatase 63 45 - 117 U/L Protein, total 6.4 6.4 - 8.2 g/dL Albumin 3.6 3.4 - 5.0 g/dL Globulin 2.8 2.0 - 4.0 g/dL A-G Ratio 1.3 0.8 - 1.7    
CBC WITH AUTOMATED DIFF Collection Time: 09/03/20  3:50 AM  
Result Value Ref Range WBC 5.4 4.6 - 13.2 K/uL  
 RBC 4.25 (L) 4.70 - 5.50 M/uL  
 HGB 13.6 13.0 - 16.0 g/dL HCT 40.5 36.0 - 48.0 % MCV 95.3 74.0 - 97.0 FL  
 MCH 32.0 24.0 - 34.0 PG  
 MCHC 33.6 31.0 - 37.0 g/dL  
 RDW 13.5 11.6 - 14.5 % PLATELET 195 802 - 639 K/uL MPV 10.0 9.2 - 11.8 FL  
 NEUTROPHILS 60 40 - 73 % LYMPHOCYTES 26 21 - 52 % MONOCYTES 9 3 - 10 % EOSINOPHILS 4 0 - 5 % BASOPHILS 1 0 - 2 %  
 ABS. NEUTROPHILS 3.3 1.8 - 8.0 K/UL  
 ABS. LYMPHOCYTES 1.4 0.9 - 3.6 K/UL  
 ABS. MONOCYTES 0.5 0.05 - 1.2 K/UL  
 ABS. EOSINOPHILS 0.2 0.0 - 0.4 K/UL  
 ABS. BASOPHILS 0.0 0.0 - 0.1 K/UL  
 DF AUTOMATED    
TSH 3RD GENERATION Collection Time: 09/03/20  3:50 AM  
Result Value Ref Range TSH 1.23 0.36 - 3.74 uIU/mL  
TROPONIN I Collection Time: 09/03/20  3:50 AM  
Result Value Ref Range Troponin-I, QT 0.12 (H) 0.0 - 0.045 NG/ML  
GLUCOSE, POC Collection Time: 09/03/20  7:25 AM  
Result Value Ref Range Glucose (POC) 103 70 - 110 mg/dL EKG Initial 14:14 9/2/20: 
Sinus rhythm with 1st degree AV block RBBB in anterior leads EKG Subsequent 17:14 9/2/20: 
Sinus rhythm with 1st degree AV block RBBB *ATTENTION:  This note has been created by a medical student for educational purposes only. Please do not refer to the content of this note for clinical decision-making, billing, or other purposes. Please see attending physicians note to obtain clinical information on this patient. *

## 2020-09-03 NOTE — PROGRESS NOTES
Problem: Discharge Planning  Goal: *Discharge to safe environment  Outcome: Resolved/Met   Plan home  Reason for Admission:   c/p                   RUR Score:    n/a                 Plan for utilizing home health:      no    PCP: First and Last name:  Karla abel   Name of Practice:    Are you a current patient: Yes/No: yes   Approximate date of last visit: last wk   Can you participate in a virtual visit with your PCP: yes                    Current Advanced Directive/Advance Care Plan: no                         Transition of Care Plan:  Spoke with pt, lives with girlfriend jay. Designates her for dcp and transport home. Independent with adls and amb. Demographics correct,no dme    Plan home, no needs anticipated. Patient and/or next of kin has been given and has signed the [facility] Outpatient Observation  Notification letter and all questions answered. Copy of this notice given to patient and copy placed on chart. Patient and/or next of kin has been given the Outpatient Observation Information and Notification letter and all questions answered. Care Management Interventions  PCP Verified by CM: Yes  Palliative Care Criteria Met (RRAT>21 & CHF Dx)?: No  Mode of Transport at Discharge: Other (see comment)  Discharge Durable Medical Equipment: No  Physical Therapy Consult: No  Occupational Therapy Consult: No  Speech Therapy Consult: No  Current Support Network:  Other  Confirm Follow Up Transport: Friends  The Patient and/or Patient Representative was Provided with a Choice of Provider and Agrees with the Discharge Plan?: No  Freedom of Choice List was Provided with Basic Dialogue that Supports the Patient's Individualized Plan of Care/Goals, Treatment Preferences and Shares the Quality Data Associated with the Providers?: No  Discharge Location  Discharge Placement: Home

## 2020-09-03 NOTE — PROGRESS NOTES
Problem: Falls - Risk of  Goal: *Absence of Falls  Description: Document He Orozco Fall Risk and appropriate interventions in the flowsheet.   Outcome: Progressing Towards Goal  Note: Fall Risk Interventions:                                Problem: Pain  Goal: *Control of Pain  Outcome: Progressing Towards Goal     Problem: Unstable angina/NSTEMI: Day of Admission/Day 1  Goal: Off Pathway (Use only if patient is Off Pathway)  Outcome: Progressing Towards Goal     Problem: Unstable angina/NSTEMI: Day 2  Goal: Psychosocial  Outcome: Progressing Towards Goal     Problem: Unstable Angina/NSTEMI: Discharge Outcomes  Goal: *Anxiety reduced or absent  Outcome: Progressing Towards Goal

## 2020-09-03 NOTE — CONSULTS
[facility] - Consult Note    Consultation request by Mary Jo Mar DO for advice/opinion related to evaluating Elevated troponin [R79.89]    Date of  Admission: 9/2/2020  2:12 PM   Primary Care Physician:  None     Assessment:     -Chest pain: Mixed feature in a patient with multiple risk factors. Troponin peak 0.2. EKG with right bundle branch block  -Risk factor to have CAD (age, male gender, remote history of topical abuse disorder, strong family history of CAD in both parents, diabetes and hypertension)  -Hypertension  -Diabetes  -Remote history of tobacco abuse disorder  -Alcohol use: He states he is drinking about 2 to 3 cans of beer almost every day     Plan:     Patient came to the hospital with chest pain with somewhat mixed feature for angina. Multiple risk factors  Patient underwent echocardiogram and nuclear stress test which I reviewed personally  Echo with normal EF  Nuclear stress test with partially reversible inferolateral defect, could be ischemia    -Aspirin  -Continue atorvastatin, lisinopril and hydrochlorothiazide  -Discussed regarding management strategy which includes medical management vs. Ischemia evaluation (Invasive). Risk, benefit and alternatives of each strategy discussed in detail. Risk, benefit, complication of LHC and possible PCI ( including but not limited to bleeding, vascular trauma requiring surgery,  infection, heart failure, stroke, MI, emergent bypass surgery, severe allergic reactions, kidney failure, dialysis and death ) were discussed with patient and willing to proceed with procedure. Will be using moderate sedation   By stating these are possible risks, this does not exclude the potential for additional risks not named here. History of Present Illness: This is a 72 y.o. male admitted for Elevated troponin [R79.89]. Patient complains of:      23-year-old male with multiple medical problems who came to the hospital with chest pain.   He said that he has a history of anxiety disease and have panic attacks in the past.  This time this pain was somewhat different with some radiating feeling and also more intense in severity. He had some sweating and diaphoresis but he denies any nausea or vomiting. Cardiology asked for further care    Cardiac risk factors: diabetes mellitus, hypertension      Review of Symptoms:  Except as stated above include:  Constitutional:  negative  Respiratory:  negative  Cardiovascular:  negative  Gastrointestinal: negative  Genitourinary:  negative  Musculoskeletal:  Negative  Neurological:  Negative  Dermatological:  Negative  Endocrinological: Negative  Psychological:  Negative    A comprehensive review of systems was negative except for that written in the HPI. Past Medical History:     Past Medical History:   Diagnosis Date    Allergic rhinitis     Asthma     Diabetes type 2, uncontrolled ([facility] [address]     Last HbA1c 7.6% per Patient in late 2020    Diabetic retinal damage of both eyes ([facility] [address]     ETOH abuse     2-3 Beers/day x>30 years    Former smoker     2/3 PPD x4-5 years; Quit ~    Generalized anxiety disorder with panic attacks     History of dermatomyositis     Patient doubts this diagnosis    HLD (hyperlipidemia)     Hypertension     Statin intolerance     \"Muscular & Neurological Damage\" due to Statins         Social History:     Social History     Socioeconomic History    Marital status: SINGLE     Spouse name: Not on file    Number of children: Not on file    Years of education: Not on file    Highest education level: Not on file   Tobacco Use    Smoking status: Former Smoker     Packs/day: 0.67     Years: 4.50     Pack years: 3.01     Last attempt to quit:      Years since quittin.6    Smokeless tobacco: Never Used    Tobacco comment: 2/3 PPD x4-5 years; Quit    Substance and Sexual Activity    Alcohol use:  Yes     Alcohol/week: 2.0 - 3.0 standard drinks     Types: 2 - 3 Cans of beer per week     Comment: 2-3 Beers/day x30 years    Drug use: Never   Other Topics Concern        Family History:     Family History   Problem Relation Age of Onset    Heart Failure Father     Coronary Artery Disease Father     Heart Failure Other         Medications:   No Known Allergies     Current Facility-Administered Medications   Medication Dose Route Frequency    busPIRone (BUSPAR) tablet 15 mg  15 mg Oral BID    zolpidem (AMBIEN) tablet 5 mg  5 mg Oral QHS PRN    lisinopriL (PRINIVIL, ZESTRIL) tablet 20 mg  20 mg Oral DAILY    hydroCHLOROthiazide (HYDRODIURIL) tablet 25 mg  25 mg Oral DAILY    sodium chloride (NS) flush 5-40 mL  5-40 mL IntraVENous Q8H    sodium chloride (NS) flush 5-40 mL  5-40 mL IntraVENous PRN    insulin lispro (HUMALOG) injection   SubCUTAneous AC&HS    glucose chewable tablet 16 g  4 Tab Oral PRN    glucagon (GLUCAGEN) injection 1 mg  1 mg IntraMUSCular PRN    dextrose (D50) infusion 12.5-25 g  25-50 mL IntraVENous PRN    ondansetron (ZOFRAN) injection 4 mg  4 mg IntraVENous Q6H PRN    aspirin chewable tablet 81 mg  81 mg Oral DAILY    melatonin tablet 12 mg  12 mg Oral QHS PRN    docusate sodium (COLACE) capsule 100 mg  100 mg Oral BID PRN    albuterol-ipratropium (DUO-NEB) 2.5 MG-0.5 MG/3 ML  3 mL Nebulization Q6H PRN    acetaminophen (TYLENOL) tablet 650 mg  650 mg Oral Q6H PRN    pantoprazole (PROTONIX) injection 40 mg  40 mg IntraVENous DAILY PRN    atorvastatin (LIPITOR) tablet 80 mg  80 mg Oral QHS         Physical Exam:     Visit Vitals  /85   Pulse 88   Temp 98.5 °F (36.9 °C)   Resp 20   Ht 5' 7.5\" (1.715 m)   Wt 163 lb (73.9 kg)   SpO2 98%   BMI 25.15 kg/m²     BP Readings from Last 3 Encounters:   09/03/20 148/85   07/20/20 127/86     Pulse Readings from Last 3 Encounters:   09/03/20 88   07/20/20 (!) 101     Wt Readings from Last 3 Encounters:   09/03/20 163 lb (73.9 kg)       General:  alert, cooperative, no distress, appears stated age  Neck:  no carotid bruit  Lungs:  clear to auscultation bilaterally  Heart:  regular rate and rhythm, S1, S2 normal, no murmur, click, rub or gallop  Abdomen:  abdomen is soft without significant tenderness, masses, organomegaly or guarding  Extremities:  extremities normal, atraumatic, no cyanosis or edema  Skin: Warm and dry.  no hyperpigmentation, vitiligo, or suspicious lesions  Neuro: alert, oriented x3, affect appropriate, no focal neurological deficits,   Psych: non focal     Data Review:     Recent Labs     09/03/20  0350 09/02/20  1434   WBC 5.4 5.0   HGB 13.6 13.8   HCT 40.5 41.0    156     Recent Labs     09/03/20  0350 09/02/20  1434    140   K 3.9 3.9    107   CO2 31 28   GLU 96 180*   BUN 17 18   CREA 0.90 1.13   CA 8.4* 8.5   MG  --  2.2   ALB 3.6 3.6   ALT 28 31       Results for orders placed or performed during the hospital encounter of 09/02/20   EKG, 12 LEAD, INITIAL   Result Value Ref Range    Ventricular Rate 100 BPM    Atrial Rate 100 BPM    P-R Interval 228 ms    QRS Duration 124 ms    Q-T Interval 354 ms    QTC Calculation (Bezet) 456 ms    Calculated P Axis 59 degrees    Calculated R Axis 153 degrees    Calculated T Axis 37 degrees    Diagnosis       Sinus rhythm with 1st degree AV block  Right bundle branch block  Abnormal ECG  When compared with ECG of 11-MAR-2009 20:40,  MO interval has increased  Right bundle branch block has replaced Incomplete right bundle branch block         All Cardiac Markers in the last 24 hours:    Lab Results   Component Value Date/Time    TROIQ 0.12 (H) 09/03/2020 03:50 AM    TROIQ 0.21 (H) 09/02/2020 10:01 PM    TROIQ 0.05 (H) 09/02/2020 04:30 PM       Last Lipid:    Lab Results   Component Value Date/Time    Cholesterol, total 192 09/03/2020 03:50 AM    HDL Cholesterol 56 09/03/2020 03:50 AM    LDL, calculated 86.2 09/03/2020 03:50 AM    Triglyceride 249 (H) 09/03/2020 03:50 AM    CHOL/HDL Ratio 3.4 09/03/2020 03:50 AM       Signed By: Adrian Penaloza MD     September 3, 2020

## 2020-09-03 NOTE — PROGRESS NOTES
2240  -- TRANSFER - IN REPORT:    PT being received from EMERGENCY  (unit) for routine progression of care      Report consisted of patients Situation, Background, Assessment and   Recommendations(SBAR). Information from the following report(s) SBAR, Procedure Summary, MAR and Recent Results was reviewed with the receiving nurse. Opportunity for questions and clarification was provided. Assessment completed upon patients arrival to unit and care assumed. Cardiac monitor applied. Required documentation completed    0330 --  Shift reassessment, pt condition unchanged, will continue to monitor.       0700 -- Bedside, Verbal and Written shift change report given to CAMILO (oncoming nurse) by [employer] nurse). Report included the following information SBAR, Kardex, Intake/Output, MAR and Recent Results. Skin assessment completed.
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
## 2020-09-03 NOTE — PROGRESS NOTES
0700-  Bedside and Verbal shift change report given to Gayatri Miller RN (oncoming nurse) by Eduardo Garcia RN (offgoing nurse). Report included the following information SBAR, Kardex, Intake/Output, MAR and Recent Results. 1010- Patient taken to nuclear medicine for stress test.     1115-  Zofran given in Nuclear Medicine due to patient becoming sick to his stomach after contrast given. 1230- Patient back from stress test. Telemetry box placed. Lunch order called in for patient. Reassessment done. [id number]  Patient reported that after speaking with his wife he agrees to go ahead with the recommended angiogram. Will notify doctor per patient's request.     1915-  Bedside and Verbal shift change report given to Lorri Antonio RN (oncoming nurse) by Gayatri Miller RN (offgoing nurse). Report included the following information SBAR, Kardex, Intake/Output, MAR and Recent Results.

## 2020-09-03 NOTE — ED NOTES
Pt refused night meds. He states he does not take cholesterol medication because it caused nerve damage, and he does not take metoprolol because his doctor told him it could \"bother my breathing\".   He takes lisinopril-hctz in the morning and a sleeping pill at night

## 2020-09-03 NOTE — PROGRESS NOTES
Problem: Pain  Goal: *Control of Pain  Outcome: Progressing Towards Goal  Goal: *PALLIATIVE CARE:  Alleviation of Pain  Outcome: Progressing Towards Goal     Problem: Patient Education: Go to Patient Education Activity  Goal: Patient/Family Education  Outcome: Progressing Towards Goal     Problem: Falls - Risk of  Goal: *Absence of Falls  Description: Document Kimberli Mckeon Fall Risk and appropriate interventions in the flowsheet.   Outcome: Progressing Towards Goal  Note: Fall Risk Interventions:                                Problem: Patient Education: Go to Patient Education Activity  Goal: Patient/Family Education  Outcome: Progressing Towards Goal     Problem: Patient Education: Go to Patient Education Activity  Goal: Patient/Family Education  Outcome: Progressing Towards Goal     Problem: Unstable angina/NSTEMI: Day of Admission/Day 1  Goal: Off Pathway (Use only if patient is Off Pathway)  Outcome: Progressing Towards Goal  Goal: Activity/Safety  Outcome: Progressing Towards Goal  Goal: Consults, if ordered  Outcome: Progressing Towards Goal  Goal: Diagnostic Test/Procedures  Outcome: Progressing Towards Goal  Goal: Nutrition/Diet  Outcome: Progressing Towards Goal  Goal: Discharge Planning  Outcome: Progressing Towards Goal  Goal: Medications  Outcome: Progressing Towards Goal  Goal: Respiratory  Outcome: Progressing Towards Goal  Goal: Treatments/Interventions/Procedures  Outcome: Progressing Towards Goal  Goal: Psychosocial  Outcome: Progressing Towards Goal  Goal: *Hemodynamically stable  Outcome: Progressing Towards Goal  Goal: *Optimal pain control at patient's stated goal  Outcome: Progressing Towards Goal  Goal: *Lungs clear or at baseline  Outcome: Progressing Towards Goal     Problem: Unstable angina/NSTEMI: Day 2  Goal: Off Pathway (Use only if patient is Off Pathway)  Outcome: Progressing Towards Goal  Goal: Activity/Safety  Outcome: Progressing Towards Goal  Goal: Consults, if ordered  Outcome: Progressing Towards Goal  Goal: Diagnostic Test/Procedures  Outcome: Progressing Towards Goal  Goal: Nutrition/Diet  Outcome: Progressing Towards Goal  Goal: Discharge Planning  Outcome: Progressing Towards Goal  Goal: Medications  Outcome: Progressing Towards Goal  Goal: Respiratory  Outcome: Progressing Towards Goal  Goal: Treatments/Interventions/Procedures  Outcome: Progressing Towards Goal  Goal: Psychosocial  Outcome: Progressing Towards Goal  Goal: *Hemodynamically stable  Outcome: Progressing Towards Goal  Goal: *Optimal pain control at patient's stated goal  Outcome: Progressing Towards Goal  Goal: *Lungs clear or at baseline  Outcome: Progressing Towards Goal     Problem: Unstable Angina/NSTEMI: Discharge Outcomes  Goal: *Hemodynamically stable  Outcome: Progressing Towards Goal  Goal: *Stable cardiac rhythm  Outcome: Progressing Towards Goal  Goal: *Lungs clear or at baseline  Outcome: Progressing Towards Goal  Goal: *Optimal pain control at patient's stated goal  Outcome: Progressing Towards Goal  Goal: *Identifies cardiac risk factors  Outcome: Progressing Towards Goal  Goal: *Verbalizes home exercise program, activity guidelines, cardiac precautions  Outcome: Progressing Towards Goal  Goal: *Verbalizes understanding and describes prescribed diet  Outcome: Progressing Towards Goal  Goal: *Verbalizes name, dosage, time, side effects, and number of days to continue medications  Outcome: Progressing Towards Goal  Goal: *Anxiety reduced or absent  Outcome: Progressing Towards Goal  Goal: *Understands and describes signs and symptoms to report to providers(Stroke Metric)  Outcome: Progressing Towards Goal  Goal: *Describes follow-up/return visits to physicians  Outcome: Progressing Towards Goal  Goal: *Describes available resources and support systems  Outcome: Progressing Towards Goal  Goal: *Influenza immunization  Outcome: Progressing Towards Goal  Goal: *Pneumococcal immunization  Outcome: Progressing Towards Goal  Goal: *Describes smoking cessation resources  Outcome: Progressing Towards Goal

## 2020-09-04 VITALS
HEART RATE: 69 BPM | TEMPERATURE: 98.1 F | SYSTOLIC BLOOD PRESSURE: 90 MMHG | OXYGEN SATURATION: 96 % | HEIGHT: 68 IN | RESPIRATION RATE: 12 BRPM | DIASTOLIC BLOOD PRESSURE: 60 MMHG | WEIGHT: 163 LBS | BODY MASS INDEX: 24.71 KG/M2

## 2020-09-04 PROBLEM — I25.10 TRIPLE VESSEL CORONARY ARTERY DISEASE: Status: ACTIVE | Noted: 2020-09-04

## 2020-09-04 PROBLEM — I21.4 NSTEMI (NON-ST ELEVATED MYOCARDIAL INFARCTION) (HCC): Status: ACTIVE | Noted: 2020-09-04

## 2020-09-04 LAB
ATRIAL RATE: 100 BPM
ATRIAL RATE: 72 BPM
ATRIAL RATE: 80 BPM
CALCULATED P AXIS, ECG09: 50 DEGREES
CALCULATED P AXIS, ECG09: 59 DEGREES
CALCULATED P AXIS, ECG09: 68 DEGREES
CALCULATED R AXIS, ECG10: 153 DEGREES
CALCULATED R AXIS, ECG10: 69 DEGREES
CALCULATED R AXIS, ECG10: 71 DEGREES
CALCULATED T AXIS, ECG11: 35 DEGREES
CALCULATED T AXIS, ECG11: 37 DEGREES
CALCULATED T AXIS, ECG11: 40 DEGREES
DIAGNOSIS, 93000: NORMAL
GLUCOSE BLD STRIP.AUTO-MCNC: 138 MG/DL (ref 70–110)
GLUCOSE BLD STRIP.AUTO-MCNC: 146 MG/DL (ref 70–110)
P-R INTERVAL, ECG05: 224 MS
P-R INTERVAL, ECG05: 228 MS
P-R INTERVAL, ECG05: 234 MS
Q-T INTERVAL, ECG07: 354 MS
Q-T INTERVAL, ECG07: 380 MS
Q-T INTERVAL, ECG07: 386 MS
QRS DURATION, ECG06: 124 MS
QRS DURATION, ECG06: 124 MS
QRS DURATION, ECG06: 128 MS
QTC CALCULATION (BEZET), ECG08: 422 MS
QTC CALCULATION (BEZET), ECG08: 438 MS
QTC CALCULATION (BEZET), ECG08: 456 MS
VENTRICULAR RATE, ECG03: 100 BPM
VENTRICULAR RATE, ECG03: 72 BPM
VENTRICULAR RATE, ECG03: 80 BPM

## 2020-09-04 PROCEDURE — 93458 L HRT ARTERY/VENTRICLE ANGIO: CPT | Performed by: INTERNAL MEDICINE

## 2020-09-04 PROCEDURE — 74011000636 HC RX REV CODE- 636: Performed by: INTERNAL MEDICINE

## 2020-09-04 PROCEDURE — 99218 HC RM OBSERVATION: CPT

## 2020-09-04 PROCEDURE — 77030029997 HC DEV COM RDL R BND TELE -B: Performed by: INTERNAL MEDICINE

## 2020-09-04 PROCEDURE — 77030013761 HC KT HRT LFT ANGI -B: Performed by: INTERNAL MEDICINE

## 2020-09-04 PROCEDURE — B2151ZZ FLUOROSCOPY OF LEFT HEART USING LOW OSMOLAR CONTRAST: ICD-10-PCS | Performed by: INTERNAL MEDICINE

## 2020-09-04 PROCEDURE — 74011000250 HC RX REV CODE- 250: Performed by: INTERNAL MEDICINE

## 2020-09-04 PROCEDURE — 4A023N7 MEASUREMENT OF CARDIAC SAMPLING AND PRESSURE, LEFT HEART, PERCUTANEOUS APPROACH: ICD-10-PCS | Performed by: INTERNAL MEDICINE

## 2020-09-04 PROCEDURE — 65660000000 HC RM CCU STEPDOWN

## 2020-09-04 PROCEDURE — 99152 MOD SED SAME PHYS/QHP 5/>YRS: CPT | Performed by: INTERNAL MEDICINE

## 2020-09-04 PROCEDURE — 74011250637 HC RX REV CODE- 250/637: Performed by: INTERNAL MEDICINE

## 2020-09-04 PROCEDURE — 77030015766: Performed by: INTERNAL MEDICINE

## 2020-09-04 PROCEDURE — 99153 MOD SED SAME PHYS/QHP EA: CPT | Performed by: INTERNAL MEDICINE

## 2020-09-04 PROCEDURE — 82962 GLUCOSE BLOOD TEST: CPT

## 2020-09-04 PROCEDURE — C1894 INTRO/SHEATH, NON-LASER: HCPCS | Performed by: INTERNAL MEDICINE

## 2020-09-04 PROCEDURE — 74011250636 HC RX REV CODE- 250/636: Performed by: INTERNAL MEDICINE

## 2020-09-04 PROCEDURE — 77030012597: Performed by: INTERNAL MEDICINE

## 2020-09-04 PROCEDURE — 77030013797 HC KT TRNSDUC PRSSR EDWD -A: Performed by: INTERNAL MEDICINE

## 2020-09-04 PROCEDURE — 76210000001 HC OR PH I REC 2.5 TO 3 HR: Performed by: INTERNAL MEDICINE

## 2020-09-04 RX ORDER — GUAIFENESIN 100 MG/5ML
81 LIQUID (ML) ORAL DAILY
Qty: 30 TAB | Refills: 0 | Status: SHIPPED | OUTPATIENT
Start: 2020-09-04 | End: 2020-09-22

## 2020-09-04 RX ORDER — VERAPAMIL HYDROCHLORIDE 2.5 MG/ML
INJECTION, SOLUTION INTRAVENOUS AS NEEDED
Status: DISCONTINUED | OUTPATIENT
Start: 2020-09-04 | End: 2020-09-04 | Stop reason: HOSPADM

## 2020-09-04 RX ORDER — METOPROLOL SUCCINATE 25 MG/1
25 TABLET, EXTENDED RELEASE ORAL DAILY
Status: DISCONTINUED | OUTPATIENT
Start: 2020-09-04 | End: 2020-09-04 | Stop reason: HOSPADM

## 2020-09-04 RX ORDER — ISOSORBIDE MONONITRATE 30 MG/1
30 TABLET, EXTENDED RELEASE ORAL DAILY
Status: DISCONTINUED | OUTPATIENT
Start: 2020-09-04 | End: 2020-09-04 | Stop reason: HOSPADM

## 2020-09-04 RX ORDER — MIDAZOLAM HYDROCHLORIDE 1 MG/ML
INJECTION, SOLUTION INTRAMUSCULAR; INTRAVENOUS AS NEEDED
Status: DISCONTINUED | OUTPATIENT
Start: 2020-09-04 | End: 2020-09-04 | Stop reason: HOSPADM

## 2020-09-04 RX ORDER — ISOSORBIDE MONONITRATE 30 MG/1
30 TABLET, EXTENDED RELEASE ORAL DAILY
Qty: 30 TAB | Refills: 0 | Status: SHIPPED | OUTPATIENT
Start: 2020-09-05 | End: 2020-09-22

## 2020-09-04 RX ORDER — ATORVASTATIN CALCIUM 80 MG/1
80 TABLET, FILM COATED ORAL
Qty: 30 TAB | Refills: 0 | Status: SHIPPED | OUTPATIENT
Start: 2020-09-04 | End: 2020-10-23 | Stop reason: SDUPTHER

## 2020-09-04 RX ORDER — ISOSORBIDE DINITRATE 10 MG/1
5 TABLET ORAL 3 TIMES DAILY
Status: DISCONTINUED | OUTPATIENT
Start: 2020-09-04 | End: 2020-09-04

## 2020-09-04 RX ORDER — BUSPIRONE HYDROCHLORIDE 15 MG/1
15 TABLET ORAL 2 TIMES DAILY
Qty: 60 TAB | Refills: 0 | Status: SHIPPED | OUTPATIENT
Start: 2020-09-04 | End: 2020-09-22

## 2020-09-04 RX ORDER — HEPARIN SODIUM 1000 [USP'U]/ML
INJECTION, SOLUTION INTRAVENOUS; SUBCUTANEOUS AS NEEDED
Status: DISCONTINUED | OUTPATIENT
Start: 2020-09-04 | End: 2020-09-04 | Stop reason: HOSPADM

## 2020-09-04 RX ORDER — LIDOCAINE HYDROCHLORIDE 10 MG/ML
INJECTION INFILTRATION; PERINEURAL AS NEEDED
Status: DISCONTINUED | OUTPATIENT
Start: 2020-09-04 | End: 2020-09-04 | Stop reason: HOSPADM

## 2020-09-04 RX ORDER — METOPROLOL SUCCINATE 25 MG/1
25 TABLET, EXTENDED RELEASE ORAL DAILY
Qty: 30 TAB | Refills: 0 | Status: SHIPPED | OUTPATIENT
Start: 2020-09-05 | End: 2020-09-22

## 2020-09-04 RX ORDER — FENTANYL CITRATE 50 UG/ML
INJECTION, SOLUTION INTRAMUSCULAR; INTRAVENOUS AS NEEDED
Status: DISCONTINUED | OUTPATIENT
Start: 2020-09-04 | End: 2020-09-04 | Stop reason: HOSPADM

## 2020-09-04 RX ADMIN — METOPROLOL SUCCINATE 25 MG: 25 TABLET, EXTENDED RELEASE ORAL at 10:58

## 2020-09-04 RX ADMIN — Medication 10 ML: at 06:00

## 2020-09-04 RX ADMIN — ISOSORBIDE MONONITRATE 30 MG: 30 TABLET, EXTENDED RELEASE ORAL at 11:58

## 2020-09-04 NOTE — PROGRESS NOTES
Problem: Pain  Goal: *Control of Pain  Outcome: Progressing Towards Goal     Problem: Falls - Risk of  Goal: *Absence of Falls  Description: Document Raimundo Fall Risk and appropriate interventions in the flowsheet.   Outcome: Progressing Towards Goal  Note: Fall Risk Interventions:                                Problem: Unstable angina/NSTEMI: Day of Admission/Day 1  Goal: Activity/Safety  Outcome: Progressing Towards Goal  Goal: Consults, if ordered  Outcome: Progressing Towards Goal  Goal: Diagnostic Test/Procedures  Outcome: Progressing Towards Goal  Goal: Nutrition/Diet  Outcome: Progressing Towards Goal  Goal: Discharge Planning  Outcome: Progressing Towards Goal  Goal: Medications  Outcome: Progressing Towards Goal  Goal: Respiratory  Outcome: Progressing Towards Goal  Goal: Treatments/Interventions/Procedures  Outcome: Progressing Towards Goal  Goal: Psychosocial  Outcome: Progressing Towards Goal  Goal: *Hemodynamically stable  Outcome: Progressing Towards Goal  Goal: *Optimal pain control at patient's stated goal  Outcome: Progressing Towards Goal  Goal: *Lungs clear or at baseline  Outcome: Progressing Towards Goal

## 2020-09-04 NOTE — PERIOP NOTES
Patient received from cath lab report received TR band intact patient in no distress. 1040 10 cc out of TR band no signs of bleeding or hematoma noted.

## 2020-09-04 NOTE — PROGRESS NOTES
Problem: Pain  Goal: *Control of Pain  Outcome: Progressing Towards Goal  Goal: *PALLIATIVE CARE:  Alleviation of Pain  Outcome: Progressing Towards Goal     Problem: Patient Education: Go to Patient Education Activity  Goal: Patient/Family Education  Outcome: Progressing Towards Goal     Problem: Falls - Risk of  Goal: *Absence of Falls  Description: Document Camryn Deshpande Fall Risk and appropriate interventions in the flowsheet.   Outcome: Progressing Towards Goal  Note: Fall Risk Interventions:                                Problem: Patient Education: Go to Patient Education Activity  Goal: Patient/Family Education  Outcome: Progressing Towards Goal     Problem: Patient Education: Go to Patient Education Activity  Goal: Patient/Family Education  Outcome: Progressing Towards Goal     Problem: Unstable angina/NSTEMI: Day of Admission/Day 1  Goal: Off Pathway (Use only if patient is Off Pathway)  Outcome: Progressing Towards Goal  Goal: Activity/Safety  Outcome: Progressing Towards Goal  Goal: Consults, if ordered  Outcome: Progressing Towards Goal  Goal: Diagnostic Test/Procedures  Outcome: Progressing Towards Goal  Goal: Nutrition/Diet  Outcome: Progressing Towards Goal  Goal: Discharge Planning  Outcome: Progressing Towards Goal  Goal: Medications  Outcome: Progressing Towards Goal  Goal: Respiratory  Outcome: Progressing Towards Goal  Goal: Treatments/Interventions/Procedures  Outcome: Progressing Towards Goal  Goal: Psychosocial  Outcome: Progressing Towards Goal  Goal: *Hemodynamically stable  Outcome: Progressing Towards Goal  Goal: *Optimal pain control at patient's stated goal  Outcome: Progressing Towards Goal  Goal: *Lungs clear or at baseline  Outcome: Progressing Towards Goal     Problem: Unstable angina/NSTEMI: Day 2  Goal: Off Pathway (Use only if patient is Off Pathway)  Outcome: Progressing Towards Goal  Goal: Activity/Safety  Outcome: Progressing Towards Goal  Goal: Consults, if ordered  Outcome: Progressing Towards Goal  Goal: Diagnostic Test/Procedures  Outcome: Progressing Towards Goal  Goal: Nutrition/Diet  Outcome: Progressing Towards Goal  Goal: Discharge Planning  Outcome: Progressing Towards Goal  Goal: Medications  Outcome: Progressing Towards Goal  Goal: Respiratory  Outcome: Progressing Towards Goal  Goal: Treatments/Interventions/Procedures  Outcome: Progressing Towards Goal  Goal: Psychosocial  Outcome: Progressing Towards Goal  Goal: *Hemodynamically stable  Outcome: Progressing Towards Goal  Goal: *Optimal pain control at patient's stated goal  Outcome: Progressing Towards Goal  Goal: *Lungs clear or at baseline  Outcome: Progressing Towards Goal     Problem: Unstable Angina/NSTEMI: Discharge Outcomes  Goal: *Hemodynamically stable  Outcome: Progressing Towards Goal  Goal: *Stable cardiac rhythm  Outcome: Progressing Towards Goal  Goal: *Lungs clear or at baseline  Outcome: Progressing Towards Goal  Goal: *Optimal pain control at patient's stated goal  Outcome: Progressing Towards Goal  Goal: *Identifies cardiac risk factors  Outcome: Progressing Towards Goal  Goal: *Verbalizes home exercise program, activity guidelines, cardiac precautions  Outcome: Progressing Towards Goal  Goal: *Verbalizes understanding and describes prescribed diet  Outcome: Progressing Towards Goal  Goal: *Verbalizes name, dosage, time, side effects, and number of days to continue medications  Outcome: Progressing Towards Goal  Goal: *Anxiety reduced or absent  Outcome: Progressing Towards Goal  Goal: *Understands and describes signs and symptoms to report to providers(Stroke Metric)  Outcome: Progressing Towards Goal  Goal: *Describes follow-up/return visits to physicians  Outcome: Progressing Towards Goal  Goal: *Describes available resources and support systems  Outcome: Progressing Towards Goal  Goal: *Influenza immunization  Outcome: Progressing Towards Goal  Goal: *Pneumococcal immunization  Outcome: Progressing Towards Goal  Goal: *Describes smoking cessation resources  Outcome: Progressing Towards Goal     Problem: Diabetes Self-Management  Goal: *Disease process and treatment process  Description: Define diabetes and identify own type of diabetes; list 3 options for treating diabetes. Outcome: Progressing Towards Goal  Goal: *Incorporating nutritional management into lifestyle  Description: Describe effect of type, amount and timing of food on blood glucose; list 3 methods for planning meals. Outcome: Progressing Towards Goal  Goal: *Incorporating physical activity into lifestyle  Description: State effect of exercise on blood glucose levels. Outcome: Progressing Towards Goal  Goal: *Developing strategies to promote health/change behavior  Description: Define the ABC's of diabetes; identify appropriate screenings, schedule and personal plan for screenings. Outcome: Progressing Towards Goal  Goal: *Using medications safely  Description: State effect of diabetes medications on diabetes; name diabetes medication taking, action and side effects. Outcome: Progressing Towards Goal  Goal: *Monitoring blood glucose, interpreting and using results  Description: Identify recommended blood glucose targets  and personal targets. Outcome: Progressing Towards Goal  Goal: *Prevention, detection, treatment of acute complications  Description: List symptoms of hyper- and hypoglycemia; describe how to treat low blood sugar and actions for lowering  high blood glucose level. Outcome: Progressing Towards Goal  Goal: *Prevention, detection and treatment of chronic complications  Description: Define the natural course of diabetes and describe the relationship of blood glucose levels to long term complications of diabetes.   Outcome: Progressing Towards Goal  Goal: *Developing strategies to address psychosocial issues  Description: Describe feelings about living with diabetes; identify support needed and support network  Outcome: Progressing Towards Goal  Goal: *Insulin pump training  Outcome: Progressing Towards Goal  Goal: *Sick day guidelines  Outcome: Progressing Towards Goal  Goal: *Patient Specific Goal (EDIT GOAL, INSERT TEXT)  Outcome: Progressing Towards Goal     Problem: Patient Education: Go to Patient Education Activity  Goal: Patient/Family Education  Outcome: Progressing Towards Goal
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
## 2020-09-04 NOTE — ROUTINE PROCESS
0720  -- Bedside, Verbal and Written shift change report received by Maru Castaneda (oncoming nurse) by Maria E(offgoing nurse). Allergy band placed on pt's wrist. Report included the following information SBAR, Kardex, Intake/Output, MAR and Recent Results. 0850-assessment completed, call bell within reach, no distress noted. Taken to cath lab. 1330 --patient returned from cath lab, right wrist with wrist brace on, good capillary refill, hand pink, good radial pulse. Instructed patient to call be if he has pain to hand/wrist, numbness. Shift reassessment, pt condition unchanged, will continue to monitor. 1600 --  Shift reassessment, pt condition unchanged, discharge instructions given, verbalized understanding. 1626-discharged home via wheelchair.

## 2020-09-04 NOTE — PERIOP NOTES
TRANSFER - OUT REPORT:    Verbal report given to aneudy rn(name) on Dolores Skdorinas  being transferred to 2205(unit) for routine post - op       Report consisted of patients Situation, Background, Assessment and   Recommendations(SBAR). Information from the following report(s) SBAR, Intake/Output and MAR was reviewed with the receiving nurse. Lines:   Peripheral IV 09/02/20 Left Hand (Active)   Site Assessment Clean, dry, & intact 09/04/20 1013   Phlebitis Assessment 0 09/04/20 1013   Infiltration Assessment 0 09/04/20 1013   Dressing Status Clean, dry, & intact 09/04/20 1013   Dressing Type Transparent;Tape 09/04/20 1013   Hub Color/Line Status Infusing;Pink 09/04/20 1013   Action Taken Open ports on tubing capped 09/04/20 0856   Alcohol Cap Used Yes 09/04/20 0856        Opportunity for questions and clarification was provided.       Patient transported with:   Registered Nurse   1300 patient transferred to [phone] in good condition patient states will call family

## 2020-09-04 NOTE — CONSULTS
[facility] - Consult Note    Consultation request by Shabnam Vallejo DO for advice/opinion related to evaluating Elevated troponin [R79.89]    Date of  Admission: 9/2/2020  2:12 PM   Primary Care Physician:  None    Subjective:  No chest pain or shortness of breath overnight. Assessment:     -Chest pain: Mixed feature in a patient with multiple risk factors. Troponin peak 0.2. EKG with right bundle branch block  -Abnormal stress test: Concerning for ischemia  -Risk factor to have CAD (age, male gender, remote history of topical abuse disorder, strong family history of CAD in both parents, diabetes and hypertension)  -Hypertension  -Diabetes  -Remote history of tobacco abuse disorder  -Alcohol use: He states he is drinking about 2 to 3 cans of beer almost every day     Plan:     Patient came to the hospital with chest pain with somewhat mixed feature for angina. Multiple risk factors  Patient underwent echocardiogram and nuclear stress test which I reviewed personally  Echo with normal EF  Nuclear stress test with partially reversible inferolateral defect, could be ischemia    Patient is agreeable with cardiac catheterization. I offered him to discuss procedure with family member however he has already talked to his family member and would like to go out and proceed with the test.  Risk-benefit alternative discussed with patient again today     History of Present Illness: This is a 72 y.o. male admitted for Elevated troponin [R79.89]. Patient complains of:      70-year-old male with multiple medical problems who came to the hospital with chest pain. He said that he has a history of anxiety disease and have panic attacks in the past.  This time this pain was somewhat different with some radiating feeling and also more intense in severity. He had some sweating and diaphoresis but he denies any nausea or vomiting.   Cardiology asked for further care    Cardiac risk factors: diabetes mellitus, hypertension      Review of Symptoms:  Except as stated above include:  Constitutional:  negative  Respiratory:  negative  Cardiovascular:  negative  Gastrointestinal: negative  Genitourinary:  negative  Musculoskeletal:  Negative  Neurological:  Negative  Dermatological:  Negative  Endocrinological: Negative  Psychological:  Negative    A comprehensive review of systems was negative except for that written in the HPI. Past Medical History:     Past Medical History:   Diagnosis Date    Allergic rhinitis     Asthma     Diabetes type 2, uncontrolled ([address]     Last HbA1c 7.6% per Patient in late 2020    Diabetic retinal damage of both eyes ([facility]     ETOH abuse     2-3 Beers/day x>30 years    Former smoker     2/3 PPD x4-5 years; Quit ~    Generalized anxiety disorder with panic attacks     History of dermatomyositis     Patient doubts this diagnosis    HLD (hyperlipidemia)     Hypertension     Statin intolerance     \"Muscular & Neurological Damage\" due to Statins         Social History:     Social History     Socioeconomic History    Marital status: SINGLE     Spouse name: Not on file    Number of children: Not on file    Years of education: Not on file    Highest education level: Not on file   Tobacco Use    Smoking status: Former Smoker     Packs/day: 0.67     Years: 4.50     Pack years: 3.01     Last attempt to quit:      Years since quittin.6    Smokeless tobacco: Never Used    Tobacco comment: 2/3 PPD x4-5 years; Quit    Substance and Sexual Activity    Alcohol use:  Yes     Alcohol/week: 2.0 - 3.0 standard drinks     Types: 2 - 3 Cans of beer per week     Comment: 2-3 Beers/day x30 years    Drug use: Never   Other Topics Concern        Family History:     Family History   Problem Relation Age of Onset    Heart Failure Father     Coronary Artery Disease Father     Heart Failure Other         Medications:   No Known Allergies     Current Facility-Administered Medications   Medication Dose Route Frequency    busPIRone (BUSPAR) tablet 15 mg  15 mg Oral BID    zolpidem (AMBIEN) tablet 5 mg  5 mg Oral QHS PRN    lisinopriL (PRINIVIL, ZESTRIL) tablet 20 mg  20 mg Oral DAILY    hydroCHLOROthiazide (HYDRODIURIL) tablet 25 mg  25 mg Oral DAILY    alum-mag hydroxide-simeth (MYLANTA) oral suspension 30 mL  30 mL Oral Q4H PRN    traMADoL (ULTRAM) tablet 50 mg  50 mg Oral Q6H PRN    sodium chloride (NS) flush 5-40 mL  5-40 mL IntraVENous Q8H    sodium chloride (NS) flush 5-40 mL  5-40 mL IntraVENous PRN    insulin lispro (HUMALOG) injection   SubCUTAneous AC&HS    glucose chewable tablet 16 g  4 Tab Oral PRN    glucagon (GLUCAGEN) injection 1 mg  1 mg IntraMUSCular PRN    dextrose (D50) infusion 12.5-25 g  25-50 mL IntraVENous PRN    ondansetron (ZOFRAN) injection 4 mg  4 mg IntraVENous Q6H PRN    aspirin chewable tablet 81 mg  81 mg Oral DAILY    melatonin tablet 12 mg  12 mg Oral QHS PRN    docusate sodium (COLACE) capsule 100 mg  100 mg Oral BID PRN    albuterol-ipratropium (DUO-NEB) 2.5 MG-0.5 MG/3 ML  3 mL Nebulization Q6H PRN    acetaminophen (TYLENOL) tablet 650 mg  650 mg Oral Q6H PRN    pantoprazole (PROTONIX) injection 40 mg  40 mg IntraVENous DAILY PRN    atorvastatin (LIPITOR) tablet 80 mg  80 mg Oral QHS         Physical Exam:     Visit Vitals  /72 (BP 1 Location: Left arm, BP Patient Position: At rest)   Pulse 65   Temp 98 °F (36.7 °C)   Resp 16   Ht 5' 7.5\" (1.715 m)   Wt 163 lb (73.9 kg)   SpO2 98%   BMI 25.15 kg/m²     BP Readings from Last 3 Encounters:   09/04/20 102/72   07/20/20 127/86     Pulse Readings from Last 3 Encounters:   09/04/20 65   07/20/20 (!) 101     Wt Readings from Last 3 Encounters:   09/03/20 163 lb (73.9 kg)       General:  alert, cooperative, no distress, appears stated age  Neck:  no carotid bruit  Lungs:  clear to auscultation bilaterally  Heart:  regular rate and rhythm, S1, S2 normal, no murmur, click, rub or gallop  Abdomen:  abdomen is soft without significant tenderness,  Extremities:  extremities normal, atraumatic, no cyanosis or edema     Data Review:     Recent Labs     09/03/20  0350 09/02/20  1434   WBC 5.4 5.0   HGB 13.6 13.8   HCT 40.5 41.0    156     Recent Labs     09/03/20  0350 09/02/20  1434    140   K 3.9 3.9    107   CO2 31 28   GLU 96 180*   BUN 17 18   CREA 0.90 1.13   CA 8.4* 8.5   MG  --  2.2   ALB 3.6 3.6   ALT 28 31       Results for orders placed or performed during the hospital encounter of 09/02/20   EKG, 12 LEAD, INITIAL   Result Value Ref Range    Ventricular Rate 100 BPM    Atrial Rate 100 BPM    P-R Interval 228 ms    QRS Duration 124 ms    Q-T Interval 354 ms    QTC Calculation (Bezet) 456 ms    Calculated P Axis 59 degrees    Calculated R Axis 153 degrees    Calculated T Axis 37 degrees    Diagnosis       Sinus rhythm with 1st degree AV block  Right bundle branch block  Abnormal ECG  When compared with ECG of 11-MAR-2009 20:40,  ND interval has increased  Right bundle branch block has replaced Incomplete right bundle branch block         All Cardiac Markers in the last 24 hours:    No results found for: CPK, CK, CKMMB, CKMB, RCK3, CKMBT, CKNDX, CKND1, DEBBIE, TROPT, TROIQ, ALDA, TROPT, TNIPOC, BNP, BNPP    Last Lipid:    Lab Results   Component Value Date/Time    Cholesterol, total 192 09/03/2020 03:50 AM    HDL Cholesterol 56 09/03/2020 03:50 AM    LDL, calculated 86.2 09/03/2020 03:50 AM    Triglyceride 249 (H) 09/03/2020 03:50 AM    CHOL/HDL Ratio 3.4 09/03/2020 03:50 AM       Signed By: Marlo Damon MD     September 4, 2020

## 2020-09-04 NOTE — DISCHARGE INSTRUCTIONS
Patient armband removed and shredded    Patient Education        ACE Inhibitors: Care Instructions  Your Care Instructions     ACE (angiotensin-converting enzyme) inhibitors lower blood pressure. They also treat heart failure and prevent heart attacks and strokes. They block an enzyme that makes blood vessels narrow. As a result, the blood vessels relax and widen. This lowers blood pressure. These medicines also put more water and salt into the urine. This lowers blood pressure too. These medicines are a good choice for people with diabetes. They don't affect blood sugar levels, and they may protect the kidneys. Examples include:  · Benazepril (Lotensin). · Lisinopril (Prinivil, Zestril). · Ramipril (Altace). Before you start taking this medicine, make sure your doctor knows if you take other medicines, especially water pills (diuretics) or potassium tablets. And tell your doctor if you use a salt substitute. You should not take an ACE inhibitor if you are pregnant or planning to become pregnant. You may need regular blood tests. Follow-up care is a key part of your treatment and safety. Be sure to make and go to all appointments, and call your doctor if you are having problems. It's also a good idea to know your test results and keep a list of the medicines you take. How can you care for yourself at home? · Take your medicines exactly as prescribed. Be sure to take your medicine every day. Call your doctor if you think you are having a problem with your medicine. · ACE inhibitors can cause a dry cough. If the cough is bad, talk to your doctor. You may need to try a different medicine. · ACE inhibitors can cause swelling of your lips, tongue, or face. If the swelling is severe, you may need treatment right away. Severe swelling can make it hard to breathe, but this is very rare. · Check with your doctor or pharmacist before you use any other medicines. This includes over-the-counter medicines.  Make sure your doctor knows all of the medicines, vitamins, herbal products, and supplements you take. Taking some medicines together can cause problems. When should you call for help? Call your doctor now or seek immediate medical care if:    · You have swelling of your lips, tongue, or face.     · You think you are having problems with your medicine. Watch closely for changes in your health, and be sure to contact your doctor if you have any problems. Patient Education        Learning About Coronary Artery Disease (CAD)  What is coronary artery disease? Coronary artery disease (CAD) occurs when plaque builds up in the arteries that bring oxygen-rich blood to your heart. Plaque is a fatty substance made of cholesterol, calcium, and other substances in the blood. This process is called hardening of the arteries, or atherosclerosis. What happens when you have coronary artery disease? · Plaque may narrow the coronary arteries. Narrowed arteries cause poor blood flow. This can lead to angina symptoms such as chest pain or discomfort. If blood flow is completely blocked, you could have a heart attack. · You can slow CAD and reduce the risk of future problems by making changes in your lifestyle. These include quitting smoking and eating heart-healthy foods. · Treatments for CAD, along with changes in your lifestyle, can help you live a longer and healthier life. How can you prevent coronary artery disease? · Do not smoke. It may be the best thing you can do to prevent heart disease. If you need help quitting, talk to your doctor about stop-smoking programs and medicines. These can increase your chances of quitting for good. · Be active. Get at least 30 minutes of exercise on most days of the week. Walking is a good choice. You also may want to do other activities, such as running, swimming, cycling, or playing tennis or team sports. · Eat heart-healthy foods.  Eat more fruits and vegetables and less foods that contain saturated and trans fats. Limit alcohol, sodium, and sweets. · Stay at a healthy weight. Lose weight if you need to. · Manage other health problems such as diabetes, high blood pressure, and high cholesterol. How is coronary artery disease treated? · Your doctor will suggest that you make lifestyle changes. For example, your doctor may ask you to eat healthy foods, quit smoking, lose extra weight, and be more active. · You will have to take medicines. · Your doctor may suggest a procedure to open narrowed or blocked arteries. This is called angioplasty. Or your doctor may suggest using healthy blood vessels to create detours around narrowed or blocked arteries. This is called bypass surgery. Follow-up care is a key part of your treatment and safety. Be sure to make and go to all appointments, and call your doctor if you are having problems. It's also a good idea to know your test results and keep a list of the medicines you take. Where can you learn more? Go to http://www.gray.com/  Enter C643 in the search box to learn more about \"Learning About Coronary Artery Disease (CAD). \"  Current as of: December 16, 2019               Content Version: 12.6  © 6160-0668 [employer]. Care instructions adapted under license by [employer] (which disclaims liability or warranty for this information). If you have questions about a medical condition or this instruction, always ask your healthcare professional. [place] [address] any warranty or liability for your use of this information. Patient Education        Coronary Artery Disease: Care Instructions  Your Care Instructions     The heart is a muscle, and like any muscle, it needs blood to work well. Coronary artery disease occurs when the arteries that bring oxygen-rich blood to your heart have a buildup of plaque--deposits of fats and other substances.  Plaque can reduce blood flow to the heart muscle. This can cause angina symptoms such as chest pain or pressure. A heart attack can happen if blood flow is completely blocked. You can do a lot to improve your health and prevent a heart attack. Eating healthy food, not smoking, getting regular exercise, and taking your medicine are the main things you can do every day to stay healthy. Follow-up care is a key part of your treatment and safety. Be sure to make and go to all appointments, and call your doctor if you are having problems. It's also a good idea to know your test results and keep a list of the medicines you take. How can you care for yourself at home? Medicines    · Be safe with medicines. Take your medicines exactly as prescribed. Call your doctor if you think you are having a problem with your medicine. You will get more details on the specific medicines your doctor prescribes.     · You will take medicines that lower your risk of a heart attack and lower your risk of dying early from heart disease. These medicines include:  ? Angiotensin-converting enzyme (ACE) inhibitors or angiotensin II receptor blockers (ARBs). They lower blood pressure. ? Aspirin and other blood thinners. They prevent blood clots that could cause a heart attack. ? Beta-blockers. They lower the heart's workload. ? Statins and other cholesterol medicines. They lower cholesterol.     · If your doctor has given you nitroglycerin for angina symptoms (such as chest pain or pressure) keep it with you at all times. If you have symptoms, sit down and rest, and take the first dose of nitroglycerin as directed. If your symptoms get worse or are not getting better within 5 minutes, call 911 right away. Stay on the phone. The emergency  will give you further instructions.     · Do not take any over-the-counter medicines, vitamins, or herbal products without talking to your doctor first.   Lifestyle  Ask your doctor if a cardiac rehab program is right for you. Cardiac rehab can help you make lifestyle changes. In cardiac rehab, a team of health professionals provides education and support to help you make new, healthy habits.    · Do not smoke. Avoid secondhand smoke too. Smoking can increase your risk of a heart attack or stroke. If you need help quitting, talk to your doctor about stop-smoking programs and medicines. These can increase your chances of quitting for good.     · Eat heart-healthy foods. These include vegetables, fruits, nuts, beans, lean meat, fish, and whole grains. Limit saturated fat, sodium, and alcohol. Limit drinks and foods with added sugar.     · If your doctor recommends it, get more exercise. Ask your doctor what level of exercise is safe for you. Walking is a good choice. Bit by bit, increase the amount you walk every day. Try for at least 30 minutes on most days of the week. You also may want to swim, bike, or do other activities.     · Stay at a healthy weight. Lose weight if you need to.     · Manage other health problems. These include diabetes, high blood pressure, and high cholesterol. If you think you may have a problem with alcohol or drug use, talk to your doctor.     · If you have angina symptoms, pay attention to your symptoms. This can help you see what causes them and what is typical for you.     · Avoid colds and flu. Get a pneumococcal vaccine shot. If you have had one before, ask your doctor whether you need another dose. Get a flu vaccine every year. If you must be around people with colds or flu, wash your hands often.     · If you think you have symptoms of depression, talk to your doctor. Symptoms include feeling sad or hopeless most of the time, or losing interest in activities that used to make you happy. When should you call for help? Call 911 anytime you think you may need emergency care. For example, call if:    · You have symptoms of a heart attack. These may include:  ?  Chest pain or pressure, or a strange feeling in the chest.  ? Sweating. ? Shortness of breath. ? Nausea or vomiting. ? Pain, pressure, or a strange feeling in the back, neck, jaw, or upper belly or in one or both shoulders or arms. ? Lightheadedness or sudden weakness. ? A fast or irregular heartbeat. After you call 911, the  may tell you to chew 1 adult-strength or 2 to 4 low-dose aspirin. Wait for an ambulance. Do not try to drive yourself.     · You have angina symptoms (such as chest pain or pressure) that do not go away with rest or are not getting better within 5 minutes after you take a dose of nitroglycerin.     · You passed out (lost consciousness). Call your doctor now or seek immediate medical care if:    · You are having angina symptoms, such as chest pain or pressure, more often than usual, or they are different or worse than usual.     · You have new or increased shortness of breath.     · You are dizzy or lightheaded, or you feel like you may faint. Watch closely for changes in your health, and be sure to contact your doctor if you have any problems. Where can you learn more? Go to http://www.gray.com/  Enter U110 in the search box to learn more about \"Coronary Artery Disease: Care Instructions. \"  Current as of: December 16, 2019               Content Version: 12.6  © 8291-1146 [employer]. Care instructions adapted under license by [employer] (which disclaims liability or warranty for this information). If you have questions about a medical condition or this instruction, always ask your healthcare professional. [place] [address] any warranty or liability for your use of this information. Patient Education        A Healthy Heart: Care Instructions  Your Care Instructions     Coronary artery disease, also called heart disease, occurs when a substance called plaque builds up in the vessels that supply oxygen-rich blood to your heart muscle.  This can narrow the blood vessels and reduce blood flow. A heart attack happens when blood flow is completely blocked. A high-fat diet, smoking, and other factors increase the risk of heart disease. Your doctor has found that you have a chance of having heart disease. You can do lots of things to keep your heart healthy. It may not be easy, but you can change your diet, exercise more, and quit smoking. These steps really work to lower your chance of heart disease. Follow-up care is a key part of your treatment and safety. Be sure to make and go to all appointments, and call your doctor if you are having problems. It's also a good idea to know your test results and keep a list of the medicines you take. How can you care for yourself at home? Diet    · Use less salt when you cook and eat. This helps lower your blood pressure. Taste food before salting. Add only a little salt when you think you need it. With time, your taste buds will adjust to less salt.     · Eat fewer snack items, fast foods, canned soups, and other high-salt, high-fat, processed foods.     · Read food labels and try to avoid saturated and trans fats. They increase your risk of heart disease by raising cholesterol levels.     · Limit the amount of solid fat-butter, margarine, and shortening-you eat. Use olive, peanut, or canola oil when you cook. Bake, broil, and steam foods instead of frying them.     · Eat a variety of fruit and vegetables every day. Dark green, deep orange, red, or yellow fruits and vegetables are especially good for you. Examples include spinach, carrots, peaches, and berries.     · Foods high in fiber can reduce your cholesterol and provide important vitamins and minerals. High-fiber foods include whole-grain cereals and breads, oatmeal, beans, brown rice, citrus fruits, and apples.     · Eat lean proteins.  Heart-healthy proteins include seafood, lean meats and poultry, eggs, beans, peas, nuts, seeds, and soy products.     · Limit drinks and foods with added sugar. These include candy, desserts, and soda pop. Lifestyle changes    · If your doctor recommends it, get more exercise. Walking is a good choice. Bit by bit, increase the amount you walk every day. Try for at least 30 minutes on most days of the week. You also may want to swim, bike, or do other activities.     · Do not smoke. If you need help quitting, talk to your doctor about stop-smoking programs and medicines. These can increase your chances of quitting for good. Quitting smoking may be the most important step you can take to protect your heart. It is never too late to quit.     · Limit alcohol to 2 drinks a day for men and 1 drink a day for women. Too much alcohol can cause health problems.     · Manage other health problems such as diabetes, high blood pressure, and high cholesterol. If you think you may have a problem with alcohol or drug use, talk to your doctor. Medicines    · Take your medicines exactly as prescribed. Call your doctor if you think you are having a problem with your medicine.     · If your doctor recommends aspirin, take the amount directed each day. Make sure you take aspirin and not another kind of pain reliever, such as acetaminophen (Tylenol). When should you call for help? Call 911 if you have symptoms of a heart attack. These may include:    · Chest pain or pressure, or a strange feeling in the chest.     · Sweating.     · Shortness of breath.     · Pain, pressure, or a strange feeling in the back, neck, jaw, or upper belly or in one or both shoulders or arms.     · Lightheadedness or sudden weakness.     · A fast or irregular heartbeat. After you call 911, the  may tell you to chew 1 adult-strength or 2 to 4 low-dose aspirin. Wait for an ambulance. Do not try to drive yourself. Watch closely for changes in your health, and be sure to contact your doctor if you have any problems. Where can you learn more?   Go to http://www.gray.com/  Enter A785 in the search box to learn more about \"A Healthy Heart: Care Instructions. \"  Current as of: December 16, 2019               Content Version: 12.6  © 1802-2093 [employer]. Care instructions adapted under license by [employer] (which disclaims liability or warranty for this information). If you have questions about a medical condition or this instruction, always ask your healthcare professional. [place] [address] any warranty or liability for your use of this information. Patient Education        Heart Attack: Care Instructions  Your Care Instructions     A heart attack (myocardial infarction, or MI) occurs when one or more of the coronary arteries, which supply the heart with oxygen-rich blood, is blocked. A blockage usually occurs when plaque inside the artery breaks open and a blood clot forms in the artery. After a heart attack, you may be worried about your future. Over the next several weeks, your heart will start to heal. Though it can be hard to break old habits, you can prevent another heart attack by making some lifestyle changes and by taking medicines. You may use this information for ideas about what to do at home to speed your recovery. Follow-up care is a key part of your treatment and safety. Be sure to make and go to all appointments, and call your doctor if you are having problems. It's also a good idea to know your test results and keep a list of the medicines you take. How can you care for yourself at home? Activity    · Until your doctor says it is okay, do not do strenuous exercise. And do not lift, pull, or push anything heavy. Ask your doctor what types of activities are safe for you.     · If your doctor has not set you up with a cardiac rehabilitation (rehab) program, talk to him or her about whether that is right for you. Cardiac rehab includes supervised exercise.  It also includes help with diet and lifestyle changes and emotional support. It may reduce your risk of future heart problems.     · Increase your activities slowly. Take short rest breaks when you get tired.     · If your doctor recommends it, get more exercise. Walking is a good choice. Bit by bit, increase the amount you walk every day. Try for at least 30 minutes on most days of the week. You also may want to swim, bike, or do other activities. Talk with your doctor before you start an exercise program to make sure it is safe for you.     · Ask your doctor when you can drive, go back to work, and do other daily activities again.     · You can have sex as soon as you feel ready for it. Often this means when you can easily walk around or climb stairs. Talk with your doctor if you have any concerns. If you are taking nitroglycerin, do not take erection-enhancing medicine such as sildenafil (Viagra), tadalafil (Cialis), or vardenafil (Levitra) . Lifestyle changes    · Do not smoke. Smoking increases your risk of another heart attack. If you need help quitting, talk to your doctor about stop-smoking programs and medicines. These can increase your chances of quitting for good.     · Eat a heart-healthy diet that is low in saturated fat and salt, and is full of fruits, vegetables and whole-grains. You may get more details about how to eat healthy. But these tips can help you get started.     · Stay at a healthy weight, or lose weight if you need to. Medicines    · Be safe with medicines. Take your medicines exactly as prescribed. Call your doctor if you think you are having a problem with your medicine. You will get more details on the specific medicines your doctor prescribes. Do not stop taking your medicine unless your doctor tells you to. Not taking your medicine might raise your risk of having another heart attack.     · You may need several medicines to help lower your risk of another heart attack.  These include:  ? Blood pressure medicines such as angiotensin-converting enzyme (ACE) inhibitors, ARBs (angiotensin II receptor blockers), and beta-blockers. ? Cholesterol medicine called statins. ? Aspirin and other blood thinners. These prevent blood clots that can cause a heart attack.     · If your doctor has given you nitroglycerin, keep it with you at all times. If you have angina symptoms, such as chest pain or pressure, sit down and rest. Take the first dose of nitroglycerin as directed. If symptoms get worse or are not getting better within 5 minutes, call 911 right away. Stay on the phone. The emergency  will tell you what to do.     · Do not take any over-the-counter medicines, vitamins, or herbal products without talking to your doctor first.   Staying healthy    · Manage other health conditions such as high blood pressure and diabetes.     · Avoid colds and flu. Get a pneumococcal vaccine shot. If you have had one before, ask your doctor whether you need another dose. Get the flu vaccine every year. If you must be around people with colds or flu, wash your hands often.     · Be sure to tell your doctor about any angina symptoms you have had, even if they went away. Pay attention to your angina symptoms. Know what is typical for you and learn how to control it. Know when to call for help.     · Talk to your family, friends, or a counselor about your feelings. It is normal to feel frightened, angry, hopeless, helpless, and even guilty. Talking openly about bad feelings can help you cope. If you have symptoms of depression, talk to your doctor. When should you call for help? Call 911 anytime you think you may need emergency care. For example, call if:    · You have symptoms of a heart attack. These may include:  ? Chest pain or pressure, or a strange feeling in the chest.  ? Sweating. ? Shortness of breath. ? Nausea or vomiting.   ? Pain, pressure, or a strange feeling in the back, neck, jaw, or upper belly or in one or both shoulders or arms. ? Lightheadedness or sudden weakness. ? A fast or irregular heartbeat. After you call 911, the  may tell you to chew 1 adult-strength or 2 to 4 low-dose aspirin. Wait for an ambulance. Do not try to drive yourself.     · You have angina symptoms (such as chest pain or pressure) that do not go away with rest or are not getting better within 5 minutes after you take a dose of nitroglycerin.     · You passed out (lost consciousness).     · You feel like you are having another heart attack. Call your doctor now or seek immediate medical care if:    · You are having angina symptoms, such as chest pain or pressure, more often than usual, or the symptoms are different or worse than usual.     · You have new or increased shortness of breath.     · You are dizzy or lightheaded, or you feel like you may faint. Watch closely for changes in your health, and be sure to contact your doctor if you have any problems. Where can you learn more? Go to http://www.gray.com/  Enter H564 in the search box to learn more about \"Heart Attack: Care Instructions. \"  Current as of: December 16, 2019               Content Version: 12.6  © 6908-5473 [employer]. Care instructions adapted under license by [employer] (which disclaims liability or warranty for this information). If you have questions about a medical condition or this instruction, always ask your healthcare professional. [place] [address] any warranty or liability for your use of this information. Cardiac Catheterization/Angiography Discharge Instructions    *Check the puncture site frequently for swelling or bleeding. If you see any bleeding, lie down and apply pressure over the area with a clean town or washcloth. Notify your doctor for any redness, swelling, drainage or oozing from the puncture site.  Notify your doctor for any fever or chills. *If the leg or arm with the puncture becomes cold, numb or painful, call Dr Celena Jay at  [phone]    *Activity should be limited for the next week or until seen by cardiothoracic surgery. no strenuous activity for a week or until seen by cardiothoracic surgery . No heavy lifting/pulling or pushing (anything over 10 pounds) until ween by cardiothoracic surgery . *Do not drive for 48 hours. *maintain cardiac diet. Drink fluids as usual.    *Have a responsible person drive you home and stay with you for at least 48 hours after your heart catheterization/angiography. *You may remove the bandage from your Right in 24 hours. You may shower in 24 hours. No tub baths, hot tubs or swimming for one week. Do not place any lotions, creams, powders, ointments over the puncture site for one week. You may place a clean band-aid over the puncture site each day for 5 days. Change this daily.

## 2020-09-04 NOTE — PROGRESS NOTES
Problem: Discharge Planning  Goal: *Discharge to safe environment  Outcome: Resolved/Met   Plan home    Patient in cardiac cath- Case Management to follow for  Needs. His plan is home. Care Management Interventions  PCP Verified by CM: Yes  Palliative Care Criteria Met (RRAT>21 & CHF Dx)?: No  Mode of Transport at Discharge: Other (see comment)  Discharge Durable Medical Equipment: No  Physical Therapy Consult: No  Occupational Therapy Consult: No  Speech Therapy Consult: No  Current Support Network: Other  Confirm Follow Up Transport: Friends  The Patient and/or Patient Representative was Provided with a Choice of Provider and Agrees with the Discharge Plan?: No  Freedom of Choice List was Provided with Basic Dialogue that Supports the Patient's Individualized Plan of Care/Goals, Treatment Preferences and Shares the Quality Data Associated with the Providers?: No  Discharge Location  Discharge Placement: Home    MI Morris  [facility]  [phone], Pager [phone]  [email or web address]

## 2020-09-08 ENCOUNTER — VIRTUAL VISIT (OUTPATIENT)
Dept: CARDIOTHORACIC SURGERY | Age: 70
End: 2020-09-08

## 2020-09-08 ENCOUNTER — PATIENT OUTREACH (OUTPATIENT)
Dept: CASE MANAGEMENT | Age: 70
End: 2020-09-08

## 2020-09-08 DIAGNOSIS — I25.10 TRIPLE VESSEL CORONARY ARTERY DISEASE: ICD-10-CM

## 2020-09-08 DIAGNOSIS — I21.4 NSTEMI (NON-ST ELEVATED MYOCARDIAL INFARCTION) (HCC): Primary | ICD-10-CM

## 2020-09-08 DIAGNOSIS — N40.0 ENLARGED PROSTATE: ICD-10-CM

## 2020-09-08 RX ORDER — AMIODARONE HYDROCHLORIDE 200 MG/1
400 TABLET ORAL 2 TIMES DAILY WITH MEALS
Qty: 20 TAB | Refills: 0 | Status: SHIPPED | OUTPATIENT
Start: 2020-09-08 | End: 2020-09-13

## 2020-09-08 RX ORDER — LORATADINE 10 MG/1
TABLET ORAL
COMMUNITY
Start: 2020-08-04

## 2020-09-08 RX ORDER — ZOLPIDEM TARTRATE 10 MG/1
TABLET ORAL
COMMUNITY
Start: 2020-08-14

## 2020-09-08 NOTE — PROGRESS NOTES
[facility]  -  Consult      9/8/2020    Consent: Swati Roberts, who was seen by synchronous (real-time) audio-video technology, and/or his healthcare decision maker, is aware that this patient-initiated, Telehealth encounter on 9/8/2020 is a billable service, with coverage as determined by his insurance carrier. He is aware that he may receive a bill and has provided verbal consent to proceed: Yes. Swati Roberts is a 72 y.o. male who is being seen on consult for CAD/NSTEMI at  Dr. Jessica Love request.    Subjective:     Chief Complaint   Patient presents with    Referral / Consult   chest pain    History of Present Illness:   Swati Roberts is a 72 y.o. male who presented with NSTEMI to [address] on 9/2. He thought he was just having an anxiety attack but realized that the chest pressure was radiating into his left shoulder and he drove himself to the ER. He was found to have abnormal stress test and underwent cardiac catheterization where he was found to have three-vessel CAD with a normal EF. He denies ever having this pain before last week. He also reports Adyspnea on exertion, exertional chest pressure/discomfort, but denies claudication, dizziness, fatigue, lower extremity edema, orthopnea, palpitations, paroxysmal nocturnal dyspnea, syncope. Class II chest pain. A  stress test revealed an EF of 77% with inferior and lateralischemia    Cardiac risk factors include smoking/ tobacco exposure, family history, dyslipidemia, diabetes mellitus, alcohol/drug abuse, hypertension. There is no recent  history of chemotherapy, prior MI, valvular heart disease, thyroid dysfunction.       Past Medical History:   Diagnosis Date    Allergic rhinitis     Asthma     Diabetes type 2, uncontrolled ([facility])     Last HbA1c 7.6% per Patient in late 8/2020    Diabetic retinal damage of both eyes ([facility] [address]     Enlarged prostate     ETOH abuse     2-3 Beers/day x>30 years    Former smoker 2/3 PPD x4-5 years; Quit ~2002    Generalized anxiety disorder with panic attacks     History of dermatomyositis     Patient doubts this diagnosis    HLD (hyperlipidemia)     Hypertension     NSTEMI (non-ST elevated myocardial infarction) ([facility] 09/02/2020    Statin intolerance     \"Muscular & Neurological Damage\" due to Statins       Past Surgical History:     Past Surgical History:   Procedure Laterality Date    HX TONSIL AND ADENOIDECTOMY         Social History:   He  reports that he quit smoking about 18 years ago. He has a 3.02 pack-year smoking history. He has never used smokeless tobacco.  He  reports current alcohol use of about 21.0 - 25.0 standard drinks of alcohol per week. He says he has never tried to quit drinking and is not sure how many days he could go without drinking. He lives with his fiance Binh Jimenez. He has 3 flights to get to apartment, no elevator. Family History:     Family History   Problem Relation Age of Onset    Heart Failure Father     Coronary Artery Disease Father 76    Heart Failure Other     Stroke Mother     Coronary Artery Disease Mother 61       Allergies and Intolerances:   No Known Allergies    Home Medications:     Current Outpatient Medications   Medication Sig Dispense Refill    loratadine (CLARITIN) 10 mg tablet TAKE 1 TABLET BY MOUTH ONCE DAILY AS NEEDED FOR ALLERGIES      zolpidem (AMBIEN) 10 mg tablet TAKE 1 TABLET BY MOUTH AT BEDTIME AS NEEDED FOR SLEEP      amiodarone (CORDARONE) 200 mg tablet Take 2 Tabs by mouth two (2) times daily (with meals) for 5 days. Indications: Afib prevention post Heart surgery 20 Tab 0    aspirin 81 mg chewable tablet Take 1 Tab by mouth daily. 30 Tab 0    atorvastatin (LIPITOR) 80 mg tablet Take 1 Tab by mouth nightly. 30 Tab 0    busPIRone (BUSPAR) 15 mg tablet Take 1 Tab by mouth two (2) times a day. 60 Tab 0    isosorbide mononitrate ER (IMDUR) 30 mg tablet Take 1 Tab by mouth daily.  30 Tab 0    metoprolol succinate (TOPROL-XL) 25 mg XL tablet Take 1 Tab by mouth daily. 30 Tab 0    lisinopril-hydroCHLOROthiazide (Zestoretic) 20-25 mg per tablet Take 1 Tab by mouth daily.  lidocaine (Lidoderm) 5 % Apply patch to the affected area for 12 hours a day and remove for 12 hours a day. 15 Each 0        Review of Systems:     He has a history of poor dentition, alternating diarrhea and constipation - takes immodium daily this week, occasionally an issue with urine stream, allergic rhinits/sinusitis, easy bruisability, and occasional wheezing for which he takes pseudophed otherwise ROS is negative    Physical Examination:   There were no vitals taken for this visit. PHYSICAL EXAMINATION:  Vital signs:   BP Readings from Last 3 Encounters:   09/04/20 90/60   07/20/20 127/86     @TMAX(24)@  Wt Readings from Last 3 Encounters:   09/03/20 73.9 kg (163 lb)     Ht Readings from Last 3 Encounters:   09/03/20 5' 7.5\" (1.715 m)       Objective:   Vital Signs: (As obtained by patient/caregiver at home)  There were no vitals taken for this visit.      [INSTRUCTIONS:  \"[x]\" Indicates a positive item  \"[]\" Indicates a negative item  -- DELETE ALL ITEMS NOT EXAMINED]    Constitutional: [x] Appears well-developed and well-nourished [x] No apparent distress      [] Abnormal -     Mental status: [x] Alert and awake  [x] Oriented to person/place/time [x] Able to follow commands    [] Abnormal -     Eyes:   EOM    [x]  Normal    [] Abnormal -   Sclera  [x]  Normal    [] Abnormal -          Discharge [x]  None visible   [] Abnormal -     HENT: [x] Normocephalic, atraumatic  [x] Abnormal - + dental caries  [x] Mouth/Throat: Mucous membranes are moist     External Ears [x] Normal  [] Abnormal -    Neck: [x] No visualized mass [] Abnormal -     Pulmonary/Chest: [x] Respiratory effort normal   [x] No visualized signs of difficulty breathing or respiratory distress        [] Abnormal -      Musculoskeletal:   [x] Normal gait with no signs of ataxia         [x] Normal range of motion of neck        [] Abnormal -     Neurological:        [x] No Facial Asymmetry (Cranial nerve 7 motor function) (limited exam due to video visit)          [x] No gaze palsy        [] Abnormal -          Skin:        [x] No significant exanthematous lesions or discoloration noted on facial skin         [] Abnormal -            Psychiatric:       [x] Normal Affect [] Abnormal -        [x] No Hallucinations    Other pertinent observable physical exam findings:-      Laboratory Data:     Lab Results   Component Value Date/Time    WBC 5.4 09/03/2020 03:50 AM    HGB 13.6 09/03/2020 03:50 AM    HCT 40.5 09/03/2020 03:50 AM    PLATELET 563 57/66/8239 03:50 AM     Lab Results   Component Value Date/Time    Sodium 141 09/03/2020 03:50 AM    Potassium 3.9 09/03/2020 03:50 AM    Chloride 107 09/03/2020 03:50 AM    CO2 31 09/03/2020 03:50 AM    Glucose 96 09/03/2020 03:50 AM    BUN 17 09/03/2020 03:50 AM    Creatinine 0.90 09/03/2020 03:50 AM     Lab Results   Component Value Date/Time    Cholesterol, total 192 09/03/2020 03:50 AM    HDL Cholesterol 56 09/03/2020 03:50 AM    LDL, calculated 86.2 09/03/2020 03:50 AM    Triglyceride 249 (H) 09/03/2020 03:50 AM     Lab Results   Component Value Date/Time    Hemoglobin A1c 7.1 (H) 09/03/2020 03:50 AM       ECHO:   09/02/20   ECHO ADULT COMPLETE 09/03/2020 9/3/2020    Narrative · Contrast used: DEFINITY. · LV: Estimated LVEF is 55 - 60%. Visually measured ejection fraction. Normal cavity size, wall thickness, systolic function (ejection fraction   normal) and diastolic function. Wall motion: normal.  · AV: Moderate aortic valve sclerosis with no evidence of reduced   excursion. · MV: Mitral valve non-specific thickening. · IVC: Moderately elevated central venous pressure (10-15 mmHg); IVC   diameter is larger than 21 mm and collapses more than 50% with   respiration. · PA: Pulmonary arterial systolic pressure is 21 mmHg.   · TV: Non-specific thickening of the tricuspid valve. Signed by: Clari Núñez MD       STRESS TEST:   09/02/20   NUCLEAR CARDIAC STRESS TEST 09/03/2020 9/3/2020    Narrative · Baseline ECG: Normal EKG. · Gated SPECT: Left ventricular function post-stress was normal.   Calculated ejection fraction is 77%. · Myocardial perfusion imaging defect 1: There is a defect that is small   to moderate in size present in the inferior and inferolateral location(s)   that is partially reversible. The defect appears to be infarction with   kevin-infarct ischemia. · Myocardial perfusion imaging supports an intermediate risk stress test.        Signed by: Clari Núñez MD     CATHETERIZATION: 9/4/20 Dr. Cantu Au: Right   Left Main    Severe calcification of LM and LAD Distal ~ 60-70% disease extending to LAD    Left Anterior Descending    Diffuse prox and mid heavy calcification Diffuse prox and mid 50-75% disease with calcification. Mid eccentric 50%. Mid-distal 75% stenosis High diagonal: Ostial and proximal 70% D2: ostial and proximal 70%    Left Circumflex    Ostial occlusion Distally filled by collateral from RCA. Right Coronary Artery    Prox-mid calcified and diffuse up to 50-60% lesions. R-L collateral to OM    Intervention     No interventions have been documented. Left Heart     Left Ventricle  The left ventricular size is normal. LV end diastolic pressure is normal. LV EDP is: 15. The estimated EF = grossly normal. There is no evidence of mitral regurgitation. Aortic Valve  There is no aortic valve stenosis.           Results for orders placed or performed during the hospital encounter of 09/02/20   EKG, 12 LEAD, INITIAL   Result Value Ref Range    Ventricular Rate 100 BPM    Atrial Rate 100 BPM    P-R Interval 228 ms    QRS Duration 124 ms    Q-T Interval 354 ms    QTC Calculation (Bezet) 456 ms    Calculated P Axis 59 degrees    Calculated R Axis 153 degrees    Calculated T Axis 37 degrees    Diagnosis       Sinus rhythm with 1st degree AV block  Right bundle branch block  Abnormal ECG  When compared with ECG of 11-MAR-2009 20:40,  IN interval has increased  Right bundle branch block has replaced Incomplete right bundle branch block  Confirmed by Sherrell Aponte ([id number]) on 9/4/2020 11:42:12 AM        RADIOLOGY DATA: (images independently reviewed)    XR Results (most recent):  Results from Hospital Encounter encounter on 09/02/20   XR CHEST PORT    Narrative EXAM: XR CHEST PORT    CLINICAL INDICATION/HISTORY: pain  -Additional: None    COMPARISON: 03/11/2009    TECHNIQUE: Frontal view of the chest    _______________    FINDINGS:    HEART AND MEDIASTINUM: Midline cardiac silhouette, normal in size. Unremarkable  hilar vascular structures. LUNGS AND PLEURAL SPACES: No focal consolidation, parenchymal opacity. No  pneumothorax or pleural effusion. BONY THORAX AND SOFT TISSUES: No acute or destructive osseous abnormality. _______________      Impression IMPRESSION:    1. No acute cardiopulmonary process. [facility] Adult Cardiac Surgery Database Version 2.9  RISK SCORES  Procedure: Isolated CAB  Risk of Mortality:0.791%  Renal Failure:0.963%  Permanent Stroke:1.195%  Prolonged Ventilation:4.547%  DSW Infection:0.139%  Reoperation:1.952%  Morbidity or Mortality:7.510%  Short Length of Stay:57.305%  Long Length of Stay: 3.010%      Assessment:   3vCAD  NL EF  NSTEMI  DM A1C 7.0  HTN  HLD  Anxiety with panic attacks  Former tobacco use - quit 20 yrs ago  Every day ETOH  **3 flights of stairs to get into house    Plan:     1. Would benefit from CABG and is aggreeable  2. Prophylactic Amiodarone 5 days prior to surgery date. 3. Stop ACEi/ARB 48h prior to surgery. 4. Will also need the follow tests to complete the workup and risk stratification.   · COVID screen  · Type and Cross  · Coagulation profile / INR  · Urinalysis  · Carotid duplex scanning (he is unsure if he had a TIA/CVA 20 years ago) + fhx of stroke  · Room air arterial blood gas      José Parker is a 72 y.o. male who was evaluated by an audio-video encounter for concerns as above. Patient identification was verified prior to start of the visit. A caregiver was present when appropriate. Due to this being a TeleHealth encounter (During NWB-11 public health emergency), evaluation of the following organ systems was limited: Vitals/Constitutional/EENT/Resp/CV/GI//MS/Neuro/Skin/Heme-Lymph-Imm. Pursuant to the emergency declaration under the [facility] waiver authority and the [employer] Act, this Virtual Visit was conducted, with patient's (and/or legal guardian's) consent, to reduce the patient's risk of exposure to COVID-19 and provide necessary medical care. Services were provided through a synchronous discussion virtually to substitute for in-person clinic visit. I was in the office. The patient was at home. In the car    --Essence Stubbs PA-C on 9/8/2020 at 12:39 PM      An electronic signature was used to authenticate this note. Addendum:    I was called by Dr. Edwige Landaverde last week regarding this patient and his three-vessel coronary artery disease with unstable angina. He is not a good PCI candidate. Reviewing his cardiac catheterization, he is not an ideal surgical candidate either but there is no real contraindication to proceeding as this is his only option at this point. He has extensive diffuse coronary disease with poor targets. I discussed this with the patient as well as Dr. Edwige Landaverde and explained that while this is not an ideal scenario I am agreeable to performing the surgery with the understanding that he is at somewhat more risk than average given his coronary findings. The patient has expressed understanding of this and wishes to proceed with surgery.   He he wishes to wait until next week despite my suggestion that we perform the surgery this week. We will set the patient up for surgery as soon as possible. All questions were answered.     Duc Harrell MD [facility]  Chief, Cardiothoracic Surgery   [facility]  [phone]

## 2020-09-08 NOTE — H&P (VIEW-ONLY)
[facility]  -  Consult 9/8/2020 Consent: Bebe Floyd, who was seen by synchronous (real-time) audio-video technology, and/or his healthcare decision maker, is aware that this patient-initiated, Telehealth encounter on 9/8/2020 is a billable service, with coverage as determined by his insurance carrier. He is aware that he may receive a bill and has provided verbal consent to proceed: Yes. Bebe Floyd is a 72 y.o. male who is being seen on consult for CAD/NSTEMI at  Dr. Kirill Morales request. 
 
Subjective: Chief Complaint Patient presents with  Referral / Consult  
chest pain History of Present Illness:  
Bebe Floyd is a 72 y.o. male who presented with NSTEMI to [facility] on 9/2. He thought he was just having an anxiety attack but realized that the chest pressure was radiating into his left shoulder and he drove himself to the ER. He was found to have abnormal stress test and underwent cardiac catheterization where he was found to have three-vessel CAD with a normal EF. He denies ever having this pain before last week. He also reports Adyspnea on exertion, exertional chest pressure/discomfort, but denies claudication, dizziness, fatigue, lower extremity edema, orthopnea, palpitations, paroxysmal nocturnal dyspnea, syncope. Class II chest pain. A  stress test revealed an EF of 77% with inferior and lateralischemia Cardiac risk factors include smoking/ tobacco exposure, family history, dyslipidemia, diabetes mellitus, alcohol/drug abuse, hypertension. There is no recent  history of chemotherapy, prior MI, valvular heart disease, thyroid dysfunction. Past Medical History:  
Diagnosis Date  Allergic rhinitis  Asthma  Diabetes type 2, uncontrolled ([address] Last HbA1c 7.6% per Patient in late 8/2020  Diabetic retinal damage of both eyes ([address]  Enlarged prostate  ETOH abuse 2-3 Beers/day x>30 years  Former smoker 2/3 PPD x4-5 years; Quit ~2002  Generalized anxiety disorder with panic attacks  History of dermatomyositis Patient doubts this diagnosis  HLD (hyperlipidemia)  Hypertension  NSTEMI (non-ST elevated myocardial infarction) ([facility] 09/02/2020  Statin intolerance \"Muscular & Neurological Damage\" due to Statins Past Surgical History:  
 
Past Surgical History:  
Procedure Laterality Date  HX TONSIL AND ADENOIDECTOMY Social History: He  reports that he quit smoking about 18 years ago. He has a 3.02 pack-year smoking history. He has never used smokeless tobacco.  He  reports current alcohol use of about 21.0 - 25.0 standard drinks of alcohol per week. He says he has never tried to quit drinking and is not sure how many days he could go without drinking. He lives with his [facility]. He has 3 flights to get to apartment, no elevator. Family History:  
 
Family History Problem Relation Age of Onset  Heart Failure Father  Coronary Artery Disease Father 76  
 Heart Failure Other  Stroke Mother  Coronary Artery Disease Mother 61 Allergies and Intolerances:  
No Known Allergies Home Medications:  
 
Current Outpatient Medications Medication Sig Dispense Refill  loratadine (CLARITIN) 10 mg tablet TAKE 1 TABLET BY MOUTH ONCE DAILY AS NEEDED FOR ALLERGIES  zolpidem (AMBIEN) 10 mg tablet TAKE 1 TABLET BY MOUTH AT BEDTIME AS NEEDED FOR SLEEP    
 amiodarone (CORDARONE) 200 mg tablet Take 2 Tabs by mouth two (2) times daily (with meals) for 5 days. Indications: Afib prevention post Heart surgery 20 Tab 0  
 aspirin 81 mg chewable tablet Take 1 Tab by mouth daily. 30 Tab 0  
 atorvastatin (LIPITOR) 80 mg tablet Take 1 Tab by mouth nightly. 30 Tab 0  
 busPIRone (BUSPAR) 15 mg tablet Take 1 Tab by mouth two (2) times a day. 60 Tab 0  
 isosorbide mononitrate ER (IMDUR) 30 mg tablet Take 1 Tab by mouth daily.  30 Tab 0  
  metoprolol succinate (TOPROL-XL) 25 mg XL tablet Take 1 Tab by mouth daily. 30 Tab 0  
 lisinopril-hydroCHLOROthiazide (Zestoretic) 20-25 mg per tablet Take 1 Tab by mouth daily.  lidocaine (Lidoderm) 5 % Apply patch to the affected area for 12 hours a day and remove for 12 hours a day. 15 Each 0 Review of Systems:  
 
He has a history of poor dentition, alternating diarrhea and constipation - takes immodium daily this week, occasionally an issue with urine stream, allergic rhinits/sinusitis, easy bruisability, and occasional wheezing for which he takes pseudophed otherwise ROS is negative Physical Examination:  
There were no vitals taken for this visit. PHYSICAL EXAMINATION: 
Vital signs:  
BP Readings from Last 3 Encounters:  
09/04/20 90/60  
07/20/20 127/86 @SHXF(87)@ Wt Readings from Last 3 Encounters:  
09/03/20 73.9 kg (163 lb) Ht Readings from Last 3 Encounters:  
09/03/20 5' 7.5\" (1.715 m) Objective:  
Vital Signs: (As obtained by patient/caregiver at home) There were no vitals taken for this visit. [INSTRUCTIONS:  \"[x]\" Indicates a positive item  \"[]\" Indicates a negative item  -- DELETE ALL ITEMS NOT EXAMINED] Constitutional: [x] Appears well-developed and well-nourished [x] No apparent distress   
  [] Abnormal - Mental status: [x] Alert and awake  [x] Oriented to person/place/time [x] Able to follow commands   
[] Abnormal - Eyes:   EOM    [x]  Normal    [] Abnormal -  
Sclera  [x]  Normal    [] Abnormal - 
        Discharge [x]  None visible   [] Abnormal - HENT: [x] Normocephalic, atraumatic  [x] Abnormal - + dental caries [x] Mouth/Throat: Mucous membranes are moist  
 
External Ears [x] Normal  [] Abnormal - Neck: [x] No visualized mass [] Abnormal - Pulmonary/Chest: [x] Respiratory effort normal   [x] No visualized signs of difficulty breathing or respiratory distress 
      [] Abnormal -  
  
 Musculoskeletal:   [x] Normal gait with no signs of ataxia [x] Normal range of motion of neck [] Abnormal -  
 
Neurological:        [x] No Facial Asymmetry (Cranial nerve 7 motor function) (limited exam due to video visit) [x] No gaze palsy  
     [] Abnormal -   
     
Skin:        [x] No significant exanthematous lesions or discoloration noted on facial skin   
     [] Abnormal - Psychiatric:       [x] Normal Affect [] Abnormal -  
     [x] No Hallucinations Other pertinent observable physical exam findings:- 
 
 
Laboratory Data:  
 
Lab Results Component Value Date/Time WBC 5.4 09/03/2020 03:50 AM  
 HGB 13.6 09/03/2020 03:50 AM  
 HCT 40.5 09/03/2020 03:50 AM  
 PLATELET 323 65/90/3942 03:50 AM  
 
Lab Results Component Value Date/Time Sodium 141 09/03/2020 03:50 AM  
 Potassium 3.9 09/03/2020 03:50 AM  
 Chloride 107 09/03/2020 03:50 AM  
 CO2 31 09/03/2020 03:50 AM  
 Glucose 96 09/03/2020 03:50 AM  
 BUN 17 09/03/2020 03:50 AM  
 Creatinine 0.90 09/03/2020 03:50 AM  
 
Lab Results Component Value Date/Time Cholesterol, total 192 09/03/2020 03:50 AM  
 HDL Cholesterol 56 09/03/2020 03:50 AM  
 LDL, calculated 86.2 09/03/2020 03:50 AM  
 Triglyceride 249 (H) 09/03/2020 03:50 AM  
 
Lab Results Component Value Date/Time Hemoglobin A1c 7.1 (H) 09/03/2020 03:50 AM  
 
 
ECHO:  
09/02/20 ECHO ADULT COMPLETE 09/03/2020 9/3/2020 Narrative · Contrast used: DEFINITY. · LV: Estimated LVEF is 55 - 60%. Visually measured ejection fraction. Normal cavity size, wall thickness, systolic function (ejection fraction  
normal) and diastolic function. Wall motion: normal. 
· AV: Moderate aortic valve sclerosis with no evidence of reduced  
excursion. · MV: Mitral valve non-specific thickening. · IVC: Moderately elevated central venous pressure (10-15 mmHg); IVC  
diameter is larger than 21 mm and collapses more than 50% with  
respiration. · PA: Pulmonary arterial systolic pressure is 21 mmHg. · TV: Non-specific thickening of the tricuspid valve. Signed by: Caitlin Willson MD  
 
 
STRESS TEST:  
09/02/20 NUCLEAR CARDIAC STRESS TEST 09/03/2020 9/3/2020 Narrative · Baseline ECG: Normal EKG. · Gated SPECT: Left ventricular function post-stress was normal.  
Calculated ejection fraction is 77%. · Myocardial perfusion imaging defect 1: There is a defect that is small  
to moderate in size present in the inferior and inferolateral location(s)  
that is partially reversible. The defect appears to be infarction with  
kevin-infarct ischemia. · Myocardial perfusion imaging supports an intermediate risk stress test. 
   
  Signed by: Caitlin Willson MD  
 
CATHETERIZATION: 9/4/20 Dr. Adriel Erickson Diagnostic Dominance: Right Left Main Severe calcification of LM and LAD Distal ~ 60-70% disease extending to LAD Left Anterior Descending Diffuse prox and mid heavy calcification Diffuse prox and mid 50-75% disease with calcification. Mid eccentric 50%. Mid-distal 75% stenosis High diagonal: Ostial and proximal 70% D2: ostial and proximal 70% Left Circumflex Ostial occlusion Distally filled by collateral from RCA. Right Coronary Artery Prox-mid calcified and diffuse up to 50-60% lesions. R-L collateral to OM Intervention No interventions have been documented. Left Heart Left Ventricle  The left ventricular size is normal. LV end diastolic pressure is normal. LV EDP is: 15. The estimated EF = grossly normal. There is no evidence of mitral regurgitation. Aortic Valve  There is no aortic valve stenosis. Results for orders placed or performed during the hospital encounter of 09/02/20 EKG, 12 LEAD, INITIAL Result Value Ref Range Ventricular Rate 100 BPM  
 Atrial Rate 100 BPM  
 P-R Interval 228 ms QRS Duration 124 ms Q-T Interval 354 ms  QTC Calculation (Bezet) 456 ms  
 Calculated P Axis 59 degrees Calculated R Axis 153 degrees Calculated T Axis 37 degrees Diagnosis Sinus rhythm with 1st degree AV block Right bundle branch block Abnormal ECG When compared with ECG of 11-MAR-2009 20:40, 
SD interval has increased Right bundle branch block has replaced Incomplete right bundle branch block Confirmed by Gabriella Boland ([id number]) on 9/4/2020 11:42:12 AM 
  
  
RADIOLOGY DATA: (images independently reviewed) XR Results (most recent): 
Results from [facility] Encounter encounter on 09/02/20 XR CHEST PORT Narrative EXAM: XR CHEST PORT 
 
CLINICAL INDICATION/HISTORY: pain 
-Additional: None COMPARISON: 03/11/2009 TECHNIQUE: Frontal view of the chest 
 
_______________ FINDINGS: 
 
HEART AND MEDIASTINUM: Midline cardiac silhouette, normal in size. Unremarkable 
hilar vascular structures. LUNGS AND PLEURAL SPACES: No focal consolidation, parenchymal opacity. No 
pneumothorax or pleural effusion. BONY THORAX AND SOFT TISSUES: No acute or destructive osseous abnormality. _______________ Impression IMPRESSION: 
 
1. No acute cardiopulmonary process. STS Adult Cardiac Surgery Database Version 2.9 RISK SCORES Procedure: Isolated CAB Risk of Mortality:0.791% Renal Failure:0.963% Permanent Stroke:1.195% Prolonged Ventilation:4.547% DSW Infection:0.139% Reoperation:1.952% Morbidity or Mortality:7.510% Short Length of Stay:57.305% Long Length of Stay: 3.010% Assessment:  
3vCAD 
NL EF 
NSTEMI 
DM A1C 7.0 
HTN 
HLD Anxiety with panic attacks Former tobacco use - quit 20 yrs ago Every day ETOH 
**3 flights of stairs to get into house Plan:  
 
1. Would benefit from CABG and is aggreeable 2. Prophylactic Amiodarone 5 days prior to surgery date. 3. Stop ACEi/ARB 48h prior to surgery. 4. Will also need the follow tests to complete the workup and risk stratification. · COVID screen · Type and Cross · Coagulation profile / INR 
 · Urinalysis · Carotid duplex scanning (he is unsure if he had a TIA/CVA 20 years ago) + fhx of stroke · Room air arterial blood gas Charli Pena is a 72 y.o. male who was evaluated by an audio-video encounter for concerns as above. Patient identification was verified prior to start of the visit. A caregiver was present when appropriate. Due to this being a TeleHealth encounter (During EEOJK-06 public health emergency), evaluation of the following organ systems was limited: Vitals/Constitutional/EENT/Resp/CV/GI//MS/Neuro/Skin/Heme-Lymph-Imm. Pursuant to the emergency declaration under the [facility], 1135 waiver authority and the [employer] Act, this Virtual Visit was conducted, with patient's (and/or legal guardian's) consent, to reduce the patient's risk of exposure to COVID-19 and provide necessary medical care. Services were provided through a synchronous discussion virtually to substitute for in-person clinic visit. I was in the office. The patient was at home. In the car 
 
--Beryle Mar, PA-C on 9/8/2020 at 12:39 PM 
 
 
An electronic signature was used to authenticate this note. Addendum: 
 
I was called by Dr. Hermilo Garzon last week regarding this patient and his three-vessel coronary artery disease with unstable angina. He is not a good PCI candidate. Reviewing his cardiac catheterization, he is not an ideal surgical candidate either but there is no real contraindication to proceeding as this is his only option at this point. He has extensive diffuse coronary disease with poor targets. I discussed this with the patient as well as Dr. Hermilo Garzon and explained that while this is not an ideal scenario I am agreeable to performing the surgery with the understanding that he is at somewhat more risk than average given his coronary findings. The patient has expressed understanding of this and wishes to proceed with surgery. He he wishes to wait until next week despite my suggestion that we perform the surgery this week. We will set the patient up for surgery as soon as possible. All questions were answered. Gurwinder Subramanian MD FACS Chief, Cardiothoracic Surgery [facility] [phone]

## 2020-09-08 NOTE — PROGRESS NOTES
Chief Complaint   Patient presents with    Referral / Consult     Referred by Dr. Charlie Schroeder for possible CABG. Patient is unable to check his vital signs as his machine has broken. He denies current chest pain/dizziness/shortness of breath/headach.     Mr. Santosh Chapman consents to today's virtual visit with Dr. Cristina Delgado and FORD Guerrier.

## 2020-09-08 NOTE — PROGRESS NOTES
.Patient contacted regarding recent discharge and COVID-19 risk. Discussed COVID-19 related testing which was not done at this time. Test results were not done. Patient informed of results, if available? no    Care Transition Nurse/ Ambulatory Care Manager/ LPN Care Coordinator contacted the patient by telephone to perform post discharge assessment. Verified name and  with patient as identifiers. Patient has following risk factors of: asthma and diabetes. CTN/ACM/LPN reviewed discharge instructions, medical action plan and red flags related to discharge diagnosis. Reviewed and educated them on any new and changed medications related to discharge diagnosis. Advised obtaining a 90-day supply of all daily and as-needed medications. Advance Care Planning:   Does patient have an Advance Directive: currently not on file; education provided     Education provided regarding infection prevention, and signs and symptoms of COVID-19 and when to seek medical attention with patient who verbalized understanding. Discussed exposure protocols and quarantine from [address] you at higher risk for severe illness  and given an opportunity for questions and concerns. The patient agrees to contact the COVID-19 hotline [phone] or PCP office for questions related to their healthcare. CTN/ACM/LPN provided contact information for future reference. From CDC: Are you at higher risk for severe illness?  Wash your hands often.  Avoid close contact (6 feet, which is about two arm lengths) with people who are sick.  Put distance between yourself and other people if COVID-19 is spreading in your community.  Clean and disinfect frequently touched surfaces.  Avoid all cruise travel and non-essential air travel.  Call your healthcare professional if you have concerns about COVID-19 and your underlying condition or if you are sick.     For more information on steps you can take to protect yourself, see CDC's How to Protect Yourself      Patient/family/caregiver given information for GetWell Loop and agrees to enroll no  Patient's preferred e-mail:  n/a  Patient's preferred phone number: n/a  Based on Loop alert triggers, patient will be contacted by nurse care manager for worsening symptoms. Plan for follow-up call in 7-14 days based on severity of symptoms and risk factors. Admit to [facility] 9/2-4/2020 NSTEMI; Has follow up with Cardiovascular surgeon at 2 pm today.

## 2020-09-10 ENCOUNTER — TELEPHONE (OUTPATIENT)
Dept: CARDIOTHORACIC SURGERY | Age: 70
End: 2020-09-10

## 2020-09-10 NOTE — TELEPHONE ENCOUNTER
Spoke to Mr. Avery Connolly and let him know that he is set up for a carotid duplex for 11am at [facility], with pre-op lab work and covid testing to follow. He verbalized understanding that he needs to enter through the heart center, and stated he will be here at 10:45 to check in. He will call us if he has any further questions.

## 2020-09-14 ENCOUNTER — HOSPITAL ENCOUNTER (OUTPATIENT)
Dept: VASCULAR SURGERY | Age: 70
Discharge: HOME | End: 2020-09-14
Attending: PHYSICIAN ASSISTANT
Payer: MEDICARE

## 2020-09-14 ENCOUNTER — HOSPITAL ENCOUNTER (OUTPATIENT)
Dept: PREADMISSION TESTING | Age: 70
Discharge: HOME | End: 2020-09-14
Attending: PHYSICIAN ASSISTANT
Payer: MEDICARE

## 2020-09-14 ENCOUNTER — APPOINTMENT (OUTPATIENT)
Dept: GENERAL RADIOLOGY | Age: 70
End: 2020-09-14
Attending: EMERGENCY MEDICINE
Payer: MEDICARE

## 2020-09-14 ENCOUNTER — HOSPITAL ENCOUNTER (EMERGENCY)
Age: 70
Discharge: HOME | End: 2020-09-14
Attending: EMERGENCY MEDICINE
Payer: MEDICARE

## 2020-09-14 VITALS
SYSTOLIC BLOOD PRESSURE: 127 MMHG | DIASTOLIC BLOOD PRESSURE: 72 MMHG | HEART RATE: 70 BPM | TEMPERATURE: 98.8 F | RESPIRATION RATE: 18 BRPM | OXYGEN SATURATION: 99 %

## 2020-09-14 DIAGNOSIS — I25.10 TRIPLE VESSEL CORONARY ARTERY DISEASE: ICD-10-CM

## 2020-09-14 DIAGNOSIS — R09.02 HYPOXIA: ICD-10-CM

## 2020-09-14 DIAGNOSIS — R06.02 SHORTNESS OF BREATH: Primary | ICD-10-CM

## 2020-09-14 DIAGNOSIS — I21.4 NSTEMI (NON-ST ELEVATED MYOCARDIAL INFARCTION) (HCC): ICD-10-CM

## 2020-09-14 DIAGNOSIS — N40.0 ENLARGED PROSTATE: ICD-10-CM

## 2020-09-14 LAB
APPEARANCE UR: CLEAR
APTT PPP: 25.8 SEC (ref 23–36.4)
ARTERIAL PATENCY WRIST A: ABNORMAL
BASE EXCESS BLD CALC-SCNC: 1 MMOL/L
BDY SITE: ABNORMAL
BILIRUB UR QL: NEGATIVE
BODY TEMPERATURE: 98.7
COLOR UR: YELLOW
GAS FLOW.O2 O2 DELIVERY SYS: ABNORMAL L/MIN
GLUCOSE UR STRIP.AUTO-MCNC: NEGATIVE MG/DL
HCO3 BLD-SCNC: 25.4 MMOL/L (ref 22–26)
HGB UR QL STRIP: NEGATIVE
INR PPP: 0.9 (ref 0.8–1.2)
KETONES UR QL STRIP.AUTO: NEGATIVE MG/DL
LEUKOCYTE ESTERASE UR QL STRIP.AUTO: NEGATIVE
NITRITE UR QL STRIP.AUTO: NEGATIVE
O2/TOTAL GAS SETTING VFR VENT: 0.21 %
PCO2 BLD: 39.7 MMHG (ref 35–45)
PH BLD: 7.41 [PH] (ref 7.35–7.45)
PH UR STRIP: 6 [PH] (ref 5–8)
PO2 BLD: 41 MMHG (ref 80–100)
PROT UR STRIP-MCNC: NEGATIVE MG/DL
PROTHROMBIN TIME: 12.4 SEC (ref 11.5–15.2)
SAO2 % BLD: 77 % (ref 92–97)
SERVICE CMNT-IMP: ABNORMAL
SP GR UR REFRACTOMETRY: 1.01 (ref 1–1.03)
SPECIMEN TYPE: ABNORMAL
TOTAL RESP. RATE, ITRR: 16
UROBILINOGEN UR QL STRIP.AUTO: 0.2 EU/DL (ref 0.2–1)

## 2020-09-14 PROCEDURE — 93880 EXTRACRANIAL BILAT STUDY: CPT

## 2020-09-14 PROCEDURE — 99282 EMERGENCY DEPT VISIT SF MDM: CPT

## 2020-09-14 PROCEDURE — 85730 THROMBOPLASTIN TIME PARTIAL: CPT

## 2020-09-14 PROCEDURE — 81003 URINALYSIS AUTO W/O SCOPE: CPT

## 2020-09-14 PROCEDURE — 36415 COLL VENOUS BLD VENIPUNCTURE: CPT

## 2020-09-14 PROCEDURE — 87086 URINE CULTURE/COLONY COUNT: CPT

## 2020-09-14 PROCEDURE — 86900 BLOOD TYPING SEROLOGIC ABO: CPT

## 2020-09-14 PROCEDURE — 82803 BLOOD GASES ANY COMBINATION: CPT

## 2020-09-14 PROCEDURE — 86920 COMPATIBILITY TEST SPIN: CPT

## 2020-09-14 PROCEDURE — 87635 SARS-COV-2 COVID-19 AMP PRB: CPT

## 2020-09-14 PROCEDURE — 85610 PROTHROMBIN TIME: CPT

## 2020-09-14 PROCEDURE — 71046 X-RAY EXAM CHEST 2 VIEWS: CPT

## 2020-09-14 PROCEDURE — 36600 WITHDRAWAL OF ARTERIAL BLOOD: CPT

## 2020-09-14 NOTE — DISCHARGE INSTRUCTIONS
Patient Education        Shortness of Breath: Care Instructions  Your Care Instructions     Shortness of breath has many causes. Sometimes conditions such as anxiety can lead to shortness of breath. Some people get mild shortness of breath when they exercise. Trouble breathing also can be a symptom of a serious problem, such as asthma, lung disease, emphysema, heart problems, and pneumonia. If your shortness of breath continues, you may need tests and treatment. Watch for any changes in your breathing and other symptoms. Follow-up care is a key part of your treatment and safety. Be sure to make and go to all appointments, and call your doctor if you are having problems. It's also a good idea to know your test results and keep a list of the medicines you take. How can you care for yourself at home? · Do not smoke or allow others to smoke around you. If you need help quitting, talk to your doctor about stop-smoking programs and medicines. These can increase your chances of quitting for good. · Get plenty of rest and sleep. · Take your medicines exactly as prescribed. Call your doctor if you think you are having a problem with your medicine. · Find healthy ways to deal with stress. ? Exercise daily. ? Get plenty of sleep. ? Eat regularly and well. When should you call for help? Call 911 anytime you think you may need emergency care. For example, call if:    · You have severe shortness of breath.     · You have symptoms of a heart attack. These may include:  ? Chest pain or pressure, or a strange feeling in the chest.  ? Sweating. ? Shortness of breath. ? Nausea or vomiting. ? Pain, pressure, or a strange feeling in the back, neck, jaw, or upper belly or in one or both shoulders or arms. ? Lightheadedness or sudden weakness. ? A fast or irregular heartbeat. After you call 911, the  may tell you to chew 1 adult-strength or 2 to 4 low-dose aspirin. Wait for an ambulance.  Do not try to drive yourself. Call your doctor now or seek immediate medical care if:    · Your shortness of breath gets worse or you start to wheeze. Wheezing is a high-pitched sound when you breathe.     · You wake up at night out of breath or have to prop your head up on several pillows to breathe.     · You are short of breath after only light activity or while at rest.   Watch closely for changes in your health, and be sure to contact your doctor if:    · You do not get better over the next 1 to 2 days. Where can you learn more? Go to http://www.gray.com/  Enter S780 in the search box to learn more about \"Shortness of Breath: Care Instructions. \"  Current as of: February 24, 2020               Content Version: 12.6  © 3881-3658 [employer] Care instructions adapted under license by [employer] (which disclaims liability or warranty for this information). If you have questions about a medical condition or this instruction, always ask your healthcare professional. [place] [address] any warranty or liability for your use of this information. Patient Education        Learning About Hypoxemia  What is hypoxemia? Hypoxemia means that you don't have enough oxygen in your blood. It's a result of diseases that affect your heart or lungs. These include heart failure, COPD, and pulmonary fibrosis (scarring of the lungs). Being at high altitudes can also lead to hypoxemia. What happens when you have hypoxemia? Oxygen gets into your blood through your lungs. Your blood carries the oxygen to all parts of your body. When you have too little oxygen in your blood, your body doesn't get enough of it. With too little oxygen, your heart and other parts of your body don't work very well. What are the symptoms? In addition to the symptoms of whatever is causing your hypoxemia, you may:  · Get tired quickly. · Be short of breath when you are active.   · Feel like your heart is pounding or racing. · Feel weak or dizzy. · Become confused. How is hypoxemia treated? Your doctor will do tests to find out how much oxygen is in your blood. He or she will look for the cause of your hypoxemia and treat that problem. For example, if you have heart failure, you may need medicines that help your heart pump better. · If your hypoxemia is not severe, your doctor may give you oxygen through a mask or nasal cannula (say \"POPEYE-angelitoh-santo\"). A cannula is a thin tube with two openings that fit just inside your nose. · If your hypoxemia is severe, you may have a breathing tube put into your windpipe. The breathing tube is attached to a machine that pushes air into your lungs. This machine is called a ventilator. · If you have a long-term problem with hypoxemia, your doctor may recommend that you use oxygen regularly. Some people need it all the time. Others need it from time to time throughout the day or overnight. Your doctor will tell you how much oxygen you need and how often to use it. Follow-up care is a key part of your treatment and safety. Be sure to make and go to all appointments, and call your doctor if you are having problems. It's also a good idea to know your test results and keep a list of the medicines you take. Where can you learn more? Go to http://de-fabiana.info/  Enter M375 in the search box to learn more about \"Learning About Hypoxemia. \"  Current as of: February 24, 2020               Content Version: 12.6  © 2006-2020 [employer]. Care instructions adapted under license by [employer] (which disclaims liability or warranty for this information). If you have questions about a medical condition or this instruction, always ask your healthcare professional. [address] any warranty or liability for your use of this information.

## 2020-09-14 NOTE — ED TRIAGE NOTES
Pt presents via triage states \"I have heart surgery scheduled for Friday, but was sent here because my oxygen was low\"

## 2020-09-14 NOTE — ED PROVIDER NOTES
EMERGENCY DEPARTMENT HISTORY AND PHYSICAL EXAM      Date: 9/14/2020  Patient Name: Cynthia Méndez    History of Presenting Illness     Chief Complaint   Patient presents with    Other     clearance for surgery friday. History Provided By: Patient    Chief Complaint: Low oxygen saturation noted earlier today    Additional History (Context): Cynthia Méndez is a 72 y.o. male who presents with low oxygen saturation noted on preoperative labs today. Patient has known coronary disease, scheduled for CABG this Friday. Came to the hospital today for preoperative labs and ultrasounds. When he had a arterial blood gas obtained, his PO2 was noted to be 42, so he was sent to the emergency department for evaluation. Patient states that he was wearing a thick mask and felt like he could not get enough air and at the time the blood gas was being drawn. Since taking the mask off, now he feels back to normal and his dyspnea has resolved. States that he gets this way whenever he wears the thick mask that he has. Denies any current active chest pain, palpitations, runny nose, cough, sputum, fevers, chills, sweats. Gets this sensation of dyspnea only when he is wearing his mask, and it always resolves when he takes his mask off. PCP: Erven Fabry, MD    Current Outpatient Medications   Medication Sig Dispense Refill    loratadine (CLARITIN) 10 mg tablet TAKE 1 TABLET BY MOUTH ONCE DAILY AS NEEDED FOR ALLERGIES      zolpidem (AMBIEN) 10 mg tablet TAKE 1 TABLET BY MOUTH AT BEDTIME AS NEEDED FOR SLEEP      aspirin 81 mg chewable tablet Take 1 Tab by mouth daily. 30 Tab 0    atorvastatin (LIPITOR) 80 mg tablet Take 1 Tab by mouth nightly. 30 Tab 0    busPIRone (BUSPAR) 15 mg tablet Take 1 Tab by mouth two (2) times a day. 60 Tab 0    isosorbide mononitrate ER (IMDUR) 30 mg tablet Take 1 Tab by mouth daily. 30 Tab 0    metoprolol succinate (TOPROL-XL) 25 mg XL tablet Take 1 Tab by mouth daily.  30 Tab 0    lisinopril-hydroCHLOROthiazide (Zestoretic) 20-25 mg per tablet Take 1 Tab by mouth daily.  lidocaine (Lidoderm) 5 % Apply patch to the affected area for 12 hours a day and remove for 12 hours a day. 15 Each 0       Past History     Past Medical History:  Past Medical History:   Diagnosis Date    Allergic rhinitis     Asthma     Diabetes type 2, uncontrolled ([facility]     Last HbA1c 7.6% per Patient in late 2020    Diabetic retinal damage of both eyes ([facility]     Enlarged prostate     ETOH abuse     2-3 Beers/day x>30 years    Former smoker     2/3 PPD x4-5 years; Quit ~    Generalized anxiety disorder with panic attacks     History of dermatomyositis     Patient doubts this diagnosis    HLD (hyperlipidemia)     Hypertension     [facility]    Statin intolerance     \"Muscular & Neurological Damage\" due to Statins       Past Surgical History:  Past Surgical History:   Procedure Laterality Date    HX TONSIL AND ADENOIDECTOMY         Family History:  Family History   Problem Relation Age of Onset    Heart Failure Father     Coronary Artery Disease Father 76    Heart Failure Other     Stroke Mother     Coronary Artery Disease Mother 61       Social History:  Social History     Tobacco Use    Smoking status: Former Smoker     Packs/day: 0.67     Years: 4.50     Pack years: 3.01     Last attempt to quit:      Years since quittin.7    Smokeless tobacco: Never Used    Tobacco comment: 2/3 PPD x4-5 years; Quit    Substance Use Topics    Alcohol use:  Yes     Alcohol/week: 21.0 - 25.0 standard drinks     Types: 21 - 25 Cans of beer per week     Frequency: 4 or more times a week     Drinks per session: 3 or 4     Binge frequency: Daily or almost daily     Comment: 2-3 Beers/day x30 years    Drug use: Never       Allergies:  No Known Allergies      Review of Systems   Review of Systems   Constitutional: Negative for chills, fatigue and fever.   HENT: Negative for congestion, rhinorrhea, sore throat and trouble swallowing. Eyes: Negative for discharge, redness and itching. Respiratory: Positive for shortness of breath. Negative for cough, wheezing and stridor. Cardiovascular: Negative for chest pain, palpitations and leg swelling. Gastrointestinal: Negative for abdominal pain, blood in stool, diarrhea, nausea and vomiting. Genitourinary: Negative for difficulty urinating and dysuria. Musculoskeletal: Negative for back pain. Skin: Negative for rash. Neurological: Negative for syncope and light-headedness. Psychiatric/Behavioral: Negative for behavioral problems and confusion. All other systems reviewed and are negative. Physical Exam     Vitals:    09/14/20 1341   BP: 127/72   Pulse: 70   Resp: 18   Temp: 98.8 °F (37.1 °C)   SpO2: 99%     Physical Exam  Constitutional:       General: He is not in acute distress. Appearance: He is well-developed and normal weight. HENT:      Head: Normocephalic and atraumatic. Nose: Nose normal.      Mouth/Throat:      Mouth: Mucous membranes are moist.      Pharynx: Oropharynx is clear. Eyes:      Extraocular Movements: Extraocular movements intact. Conjunctiva/sclera: Conjunctivae normal.      Pupils: Pupils are equal, round, and reactive to light. Neck:      Musculoskeletal: Normal range of motion and neck supple. Cardiovascular:      Rate and Rhythm: Normal rate and regular rhythm. Heart sounds: Normal heart sounds. No murmur. Pulmonary:      Effort: Pulmonary effort is normal.      Breath sounds: Normal breath sounds. No wheezing. Abdominal:      General: Bowel sounds are normal.      Palpations: Abdomen is soft. Tenderness: There is no abdominal tenderness. Musculoskeletal: Normal range of motion. General: No tenderness. Skin:     General: Skin is warm and dry. Capillary Refill: Capillary refill takes less than 2 seconds. Neurological:      General: No focal deficit present. Mental Status: He is alert and oriented to person, place, and time.    Psychiatric:         Mood and Affect: Mood normal.         Behavior: Behavior normal.           Diagnostic Study Results     Labs -     Recent Results (from the past 12 hour(s))   PTT    Collection Time: 09/14/20 11:22 AM   Result Value Ref Range    aPTT 25.8 23.0 - 36.4 SEC   PROTHROMBIN TIME + INR    Collection Time: 09/14/20 11:22 AM   Result Value Ref Range    Prothrombin time 12.4 11.5 - 15.2 sec    INR 0.9 0.8 - 1.2     URINALYSIS W/ RFLX MICROSCOPIC    Collection Time: 09/14/20 11:22 AM   Result Value Ref Range    Color YELLOW      Appearance CLEAR      Specific gravity 1.010 1.005 - 1.030      pH (UA) 6.0 5.0 - 8.0      Protein Negative NEG mg/dL    Glucose Negative NEG mg/dL    Ketone Negative NEG mg/dL    Bilirubin Negative NEG      Blood Negative NEG      Urobilinogen 0.2 0.2 - 1.0 EU/dL    Nitrites Negative NEG      Leukocyte Esterase Negative NEG     DUPLEX CAROTID BILATERAL    Collection Time: 09/14/20 11:45 AM   Result Value Ref Range    Right subclavian sys 131.1 cm/s    RIGHT SUBCLAVIAN ARTERY D 0.00 cm/s    Right cca dist sys 73.6 cm/s    Right CCA dist knott 18.0 cm/s    RIGHT COMMON CAROTID ARTERY MID S 76.22 cm/s    RIGHT COMMON CAROTID ARTERY MID D 15.40 cm/s    Right CCA prox sys 46.1 cm/s    Right CCA prox knott 6.7 cm/s    Right eca sys 94.3 cm/s    RIGHT EXTERNAL CAROTID ARTERY D 11.52 cm/s    Right ICA dist sys 70.7 cm/s    Right ICA dist knott 20.2 cm/s    Right ICA mid sys 107.4 cm/s    Right ICA mid knott 29.1 cm/s    Right ICA prox sys 94.9 cm/s    Right ICA prox knott 24.4 cm/s    Right vertebral sys 47.6 cm/s    RIGHT VERTEBRAL ARTERY D 14.48 cm/s    Right ICA/CCA sys 1.5     Left subclavian sys 175.2 cm/s    LEFT SUBCLAVIAN ARTERY D 0.00 cm/s    Left CCA dist sys 86.6 cm/s    Left CCA dist knott 17.1 cm/s    LEFT COMMON CAROTID ARTERY MID S 80.11 cm/s    LEFT COMMON CAROTID ARTERY MID D 13.89 cm/s    Left CCA prox sys 83.3 cm/s    Left CCA prox knott 9.0 cm/s    Left ECA sys 115.7 cm/s    LEFT EXTERNAL CAROTID ARTERY D 9.05 cm/s    Left ICA dist sys 74.1 cm/s    Left ICA dist knott 16.3 cm/s    Left ICA mid sys 94.3 cm/s    Left ICA mid knott 25.6 cm/s    Left ICA prox sys 59.1 cm/s    Left ICA prox knott 18.7 cm/s    Left vertebral sys 21.2 cm/s    LEFT VERTEBRAL ARTERY D 10.19 cm/s    Left ICA/CCA sys 1.09    TYPE & SCREEN    Collection Time: 09/14/20 12:20 PM   Result Value Ref Range    Crossmatch Expiration 09/21/2020     ABO/Rh(D) PENDING     Antibody screen PENDING    POC G3    Collection Time: 09/14/20 12:48 PM   Result Value Ref Range    Device: ROOM AIR      FIO2 (POC) 0.21 %    pH (POC) 7.41 7.35 - 7.45      pCO2 (POC) 39.7 35.0 - 45.0 MMHG    pO2 (POC) 41 (LL) 80 - 100 MMHG    HCO3 (POC) 25.4 22 - 26 MMOL/L    sO2 (POC) 77 (L) 92 - 97 %    Base excess (POC) 1 mmol/L    Allens test (POC) N/A      Total resp. rate 16      Site LEFT RADIAL      Patient temp. 98.7      Specimen type (POC) ARTERIAL      Performed by Grace Gil        Radiologic Studies -   XR CHEST PA LAT   Final Result   IMPRESSION:       No acute cardiothoracic findings. Streaky density at the left lung base has   resolved. CT Results  (Last 48 hours)    None        CXR Results  (Last 48 hours)               09/14/20 1354  XR CHEST PA LAT Final result    Impression:  IMPRESSION:        No acute cardiothoracic findings. Streaky density at the left lung base has   resolved. Narrative:  EXAM: XR CHEST PA LAT       INDICATION: low oxygen saturation earlier (normal now)       COMPARISON: September 2, 2020. FINDINGS: PA and lateral radiographs of the chest demonstrate clear lungs. The   cardiac and mediastinal contours and pulmonary vascularity are normal. Decreased   bone mineral density. Osseous degenerative changes. [place] density at the left   lung base has resolved. Medical Decision Making   I am the first provider for this patient. I reviewed the vital signs, available nursing notes, past medical history, past surgical history, family history and social history. Vital Signs-Reviewed the patient's vital signs. Records Reviewed: Nursing Notes and Old Medical Records    ED Course:   Remained stable during his emergency department stay    Disposition:  Discharge home    DISCHARGE NOTE:     Pt has been reexamined. Patient has no new complaints, changes, or physical findings. Care plan outlined and precautions discussed. Results of x-ray were reviewed with the patient. All medications were reviewed with the patient; will d/c home with return precautions. All of pt's questions and concerns were addressed. Patient was instructed and agrees to follow up with his surgeon this Friday as scheduled, as well as to return to the ED upon further deterioration. Patient is ready to go home. Follow-up Information     Follow up With Specialties Details Why Contact Bernabe Oates MD Family Medicine Call in 1 day As needed, If symptoms worsen [phone] [facility] EMERGENCY DEPT Emergency Medicine  As needed, If symptoms worsen [address] [place], [place] [address]  [phone]          Current Discharge Medication List          Provider Notes (Medical Decision Making):   Hypoxia associated with wearing a thick heavy mask. After pulling mass down and walking to the emergency department, patient is now at 98% oxygen saturation on room air and his symptoms are gone. Reassuring x-ray and nonfocal exam.  Hypoxia likely related simply to the heavy mask, no evidence of pneumonia or pneumothorax or other pulmonary or cardiac etiology of his symptoms. Diagnosis     Clinical Impression:   1. Shortness of breath    2.  Hypoxia

## 2020-09-15 LAB
LEFT CCA DIST DIAS: 17.1 CM/S
LEFT CCA DIST SYS: 86.6 CM/S
LEFT CCA MID DIAS: 13.89 CM/S
LEFT CCA MID SYS: 80.11 CM/S
LEFT CCA PROX DIAS: 9 CM/S
LEFT CCA PROX SYS: 83.3 CM/S
LEFT ECA DIAS: 9.05 CM/S
LEFT ECA SYS: 115.7 CM/S
LEFT ICA DIST DIAS: 16.3 CM/S
LEFT ICA DIST SYS: 74.1 CM/S
LEFT ICA MID DIAS: 25.6 CM/S
LEFT ICA MID SYS: 94.3 CM/S
LEFT ICA PROX DIAS: 18.7 CM/S
LEFT ICA PROX SYS: 59.1 CM/S
LEFT ICA/CCA SYS: 1.09
LEFT SUBCLAVIAN DIAS: 0 CM/S
LEFT SUBCLAVIAN SYS: 175.2 CM/S
LEFT VERTEBRAL DIAS: 10.19 CM/S
LEFT VERTEBRAL SYS: 21.2 CM/S
RIGHT CCA DIST DIAS: 18 CM/S
RIGHT CCA DIST SYS: 73.6 CM/S
RIGHT CCA MID DIAS: 15.4 CM/S
RIGHT CCA MID SYS: 76.22 CM/S
RIGHT CCA PROX DIAS: 6.7 CM/S
RIGHT CCA PROX SYS: 46.1 CM/S
RIGHT ECA DIAS: 11.52 CM/S
RIGHT ECA SYS: 94.3 CM/S
RIGHT ICA DIST DIAS: 20.2 CM/S
RIGHT ICA DIST SYS: 70.7 CM/S
RIGHT ICA MID DIAS: 29.1 CM/S
RIGHT ICA MID SYS: 107.4 CM/S
RIGHT ICA PROX DIAS: 24.4 CM/S
RIGHT ICA PROX SYS: 94.9 CM/S
RIGHT ICA/CCA SYS: 1.5
RIGHT SUBCLAVIAN DIAS: 0 CM/S
RIGHT SUBCLAVIAN SYS: 131.1 CM/S
RIGHT VERTEBRAL DIAS: 14.48 CM/S
RIGHT VERTEBRAL SYS: 47.6 CM/S

## 2020-09-16 LAB
BACTERIA SPEC CULT: NORMAL
SARS-COV-2, COV2NT: NOT DETECTED
SERVICE CMNT-IMP: NORMAL

## 2020-09-18 ENCOUNTER — ANESTHESIA EVENT (OUTPATIENT)
Dept: CARDIOTHORACIC SURGERY | Age: 70
DRG: 235 | End: 2020-09-18
Payer: MEDICARE

## 2020-09-18 ENCOUNTER — HOSPITAL ENCOUNTER (INPATIENT)
Age: 70
LOS: 4 days | Discharge: HOME HEALTH | DRG: 235 | End: 2020-09-22
Attending: SPECIALIST | Admitting: SPECIALIST
Payer: MEDICARE

## 2020-09-18 ENCOUNTER — APPOINTMENT (OUTPATIENT)
Dept: GENERAL RADIOLOGY | Age: 70
DRG: 235 | End: 2020-09-18
Attending: PHYSICIAN ASSISTANT
Payer: MEDICARE

## 2020-09-18 ENCOUNTER — ANESTHESIA (OUTPATIENT)
Dept: CARDIOTHORACIC SURGERY | Age: 70
DRG: 235 | End: 2020-09-18
Payer: MEDICARE

## 2020-09-18 DIAGNOSIS — F41.1 GENERALIZED ANXIETY DISORDER WITH PANIC ATTACKS: ICD-10-CM

## 2020-09-18 DIAGNOSIS — E78.5 HYPERLIPIDEMIA, UNSPECIFIED HYPERLIPIDEMIA TYPE: ICD-10-CM

## 2020-09-18 DIAGNOSIS — I25.110 CORONARY ARTERY DISEASE INVOLVING NATIVE CORONARY ARTERY OF NATIVE HEART WITH UNSTABLE ANGINA PECTORIS (HCC): ICD-10-CM

## 2020-09-18 DIAGNOSIS — E11.9 TYPE 2 DIABETES MELLITUS WITHOUT COMPLICATION, WITHOUT LONG-TERM CURRENT USE OF INSULIN (HCC): ICD-10-CM

## 2020-09-18 DIAGNOSIS — Z87.891 FORMER SMOKER: ICD-10-CM

## 2020-09-18 DIAGNOSIS — Z95.1 S/P CABG X 4: Primary | ICD-10-CM

## 2020-09-18 DIAGNOSIS — F41.0 GENERALIZED ANXIETY DISORDER WITH PANIC ATTACKS: ICD-10-CM

## 2020-09-18 DIAGNOSIS — F10.10 ETOH ABUSE: ICD-10-CM

## 2020-09-18 DIAGNOSIS — I21.4 NSTEMI (NON-ST ELEVATED MYOCARDIAL INFARCTION) (HCC): ICD-10-CM

## 2020-09-18 PROBLEM — I25.10 CAD (CORONARY ARTERY DISEASE): Status: ACTIVE | Noted: 2020-09-18

## 2020-09-18 LAB
ADMINISTERED INITIALS, ADMINIT: NORMAL
ANION GAP SERPL CALC-SCNC: 7 MMOL/L (ref 3–18)
APTT PPP: 29.6 SEC (ref 23–36.4)
ARTERIAL PATENCY WRIST A: ABNORMAL
BASE DEFICIT BLD-SCNC: 2 MMOL/L
BASE DEFICIT BLD-SCNC: 2 MMOL/L
BASE DEFICIT BLD-SCNC: 4 MMOL/L
BASE EXCESS BLD CALC-SCNC: 0 MMOL/L
BASE EXCESS BLD CALC-SCNC: 0 MMOL/L
BASE EXCESS BLD CALC-SCNC: 2 MMOL/L
BASE EXCESS BLD CALC-SCNC: 2 MMOL/L
BASE EXCESS BLD CALC-SCNC: 3 MMOL/L
BASOPHILS # BLD: 0 K/UL (ref 0–0.1)
BASOPHILS NFR BLD: 0 % (ref 0–2)
BDY SITE: ABNORMAL
BODY TEMPERATURE: 36
BODY TEMPERATURE: 36.3
BUN SERPL-MCNC: 14 MG/DL (ref 7–18)
BUN/CREAT SERPL: 15 (ref 12–20)
CA-I BLD-MCNC: 0.99 MMOL/L (ref 1.12–1.32)
CA-I BLD-MCNC: 1.01 MMOL/L (ref 1.12–1.32)
CA-I BLD-MCNC: 1.07 MMOL/L (ref 1.12–1.32)
CA-I BLD-MCNC: 1.07 MMOL/L (ref 1.12–1.32)
CA-I BLD-MCNC: 1.09 MMOL/L (ref 1.12–1.32)
CA-I BLD-MCNC: 1.1 MMOL/L (ref 1.12–1.32)
CA-I BLD-MCNC: 1.22 MMOL/L (ref 1.12–1.32)
CA-I BLD-MCNC: 1.25 MMOL/L (ref 1.12–1.32)
CALCIUM SERPL-MCNC: 7.1 MG/DL (ref 8.5–10.1)
CHLORIDE SERPL-SCNC: 109 MMOL/L (ref 100–111)
CO2 SERPL-SCNC: 27 MMOL/L (ref 21–32)
CREAT SERPL-MCNC: 0.91 MG/DL (ref 0.6–1.3)
D50 ADMINISTERED, D50ADM: 0 ML
D50 ORDER, D50ORD: 0 ML
DIFFERENTIAL METHOD BLD: ABNORMAL
EOSINOPHIL # BLD: 0.1 K/UL (ref 0–0.4)
EOSINOPHIL NFR BLD: 1 % (ref 0–5)
ERYTHROCYTE [DISTWIDTH] IN BLOOD BY AUTOMATED COUNT: 13.2 % (ref 11.6–14.5)
GAS FLOW.O2 O2 DELIVERY SYS: ABNORMAL L/MIN
GAS FLOW.O2 SETTING OXYMISER: 14 BPM
GLUCOSE BLD STRIP.AUTO-MCNC: 106 MG/DL (ref 70–110)
GLUCOSE BLD STRIP.AUTO-MCNC: 111 MG/DL (ref 70–110)
GLUCOSE BLD STRIP.AUTO-MCNC: 118 MG/DL (ref 70–110)
GLUCOSE BLD STRIP.AUTO-MCNC: 121 MG/DL (ref 70–110)
GLUCOSE BLD STRIP.AUTO-MCNC: 129 MG/DL (ref 70–110)
GLUCOSE BLD STRIP.AUTO-MCNC: 131 MG/DL (ref 70–110)
GLUCOSE BLD STRIP.AUTO-MCNC: 144 MG/DL (ref 74–106)
GLUCOSE BLD STRIP.AUTO-MCNC: 153 MG/DL (ref 70–110)
GLUCOSE BLD STRIP.AUTO-MCNC: 153 MG/DL (ref 74–106)
GLUCOSE BLD STRIP.AUTO-MCNC: 159 MG/DL (ref 70–110)
GLUCOSE BLD STRIP.AUTO-MCNC: 165 MG/DL (ref 74–106)
GLUCOSE BLD STRIP.AUTO-MCNC: 187 MG/DL (ref 74–106)
GLUCOSE BLD STRIP.AUTO-MCNC: 213 MG/DL (ref 74–106)
GLUCOSE BLD STRIP.AUTO-MCNC: 218 MG/DL (ref 74–106)
GLUCOSE BLD STRIP.AUTO-MCNC: 239 MG/DL (ref 74–106)
GLUCOSE BLD STRIP.AUTO-MCNC: 252 MG/DL (ref 74–106)
GLUCOSE BLD STRIP.AUTO-MCNC: 84 MG/DL (ref 70–110)
GLUCOSE BLD STRIP.AUTO-MCNC: 89 MG/DL (ref 70–110)
GLUCOSE SERPL-MCNC: 185 MG/DL (ref 74–99)
GLUCOSE, GLC: 106 MG/DL
GLUCOSE, GLC: 111 MG/DL
GLUCOSE, GLC: 118 MG/DL
GLUCOSE, GLC: 121 MG/DL
GLUCOSE, GLC: 131 MG/DL
GLUCOSE, GLC: 153 MG/DL
GLUCOSE, GLC: 159 MG/DL
GLUCOSE, GLC: 187 MG/DL
GLUCOSE, GLC: 84 MG/DL
GLUCOSE, GLC: 89 MG/DL
HCO3 BLD-SCNC: 21.6 MMOL/L (ref 22–26)
HCO3 BLD-SCNC: 23.3 MMOL/L (ref 22–26)
HCO3 BLD-SCNC: 23.3 MMOL/L (ref 22–26)
HCO3 BLD-SCNC: 24.8 MMOL/L (ref 22–26)
HCO3 BLD-SCNC: 25.2 MMOL/L (ref 22–26)
HCO3 BLD-SCNC: 26.4 MMOL/L (ref 22–26)
HCO3 BLD-SCNC: 26.7 MMOL/L (ref 22–26)
HCO3 BLD-SCNC: 26.9 MMOL/L (ref 22–26)
HCO3 BLD-SCNC: 27.2 MMOL/L (ref 22–26)
HCO3 BLD-SCNC: 27.2 MMOL/L (ref 22–26)
HCO3 BLD-SCNC: 27.5 MMOL/L (ref 22–26)
HCT VFR BLD AUTO: 34.3 % (ref 36–48)
HCT VFR BLD CALC: 26 % (ref 36–49)
HCT VFR BLD CALC: 27 % (ref 36–49)
HCT VFR BLD CALC: 28 % (ref 36–49)
HCT VFR BLD CALC: 28 % (ref 36–49)
HCT VFR BLD CALC: 29 % (ref 36–49)
HCT VFR BLD CALC: 33 % (ref 36–49)
HCT VFR BLD CALC: 35 % (ref 36–49)
HCT VFR BLD CALC: 36 % (ref 36–49)
HGB BLD-MCNC: 11.2 G/DL (ref 12–16)
HGB BLD-MCNC: 11.4 G/DL (ref 13–16)
HGB BLD-MCNC: 11.9 G/DL (ref 12–16)
HGB BLD-MCNC: 12.2 G/DL (ref 12–16)
HGB BLD-MCNC: 8.8 G/DL (ref 12–16)
HGB BLD-MCNC: 9.2 G/DL (ref 12–16)
HGB BLD-MCNC: 9.5 G/DL (ref 12–16)
HGB BLD-MCNC: 9.5 G/DL (ref 12–16)
HGB BLD-MCNC: 9.9 G/DL (ref 12–16)
HIGH TARGET, HITG: 150 MG/DL
INR PPP: 1.2 (ref 0.8–1.2)
INSULIN ADMINSTERED, INSADM: 1 UNITS/HOUR
INSULIN ADMINSTERED, INSADM: 1.2 UNITS/HOUR
INSULIN ADMINSTERED, INSADM: 1.8 UNITS/HOUR
INSULIN ADMINSTERED, INSADM: 2 UNITS/HOUR
INSULIN ADMINSTERED, INSADM: 2.3 UNITS/HOUR
INSULIN ADMINSTERED, INSADM: 2.4 UNITS/HOUR
INSULIN ADMINSTERED, INSADM: 2.5 UNITS/HOUR
INSULIN ADMINSTERED, INSADM: 2.8 UNITS/HOUR
INSULIN ADMINSTERED, INSADM: 3 UNITS/HOUR
INSULIN ADMINSTERED, INSADM: 3.7 UNITS/HOUR
INSULIN ORDER, INSORD: 1 UNITS/HOUR
INSULIN ORDER, INSORD: 1.2 UNITS/HOUR
INSULIN ORDER, INSORD: 1.8 UNITS/HOUR
INSULIN ORDER, INSORD: 2 UNITS/HOUR
INSULIN ORDER, INSORD: 2.3 UNITS/HOUR
INSULIN ORDER, INSORD: 2.4 UNITS/HOUR
INSULIN ORDER, INSORD: 2.5 UNITS/HOUR
INSULIN ORDER, INSORD: 2.8 UNITS/HOUR
INSULIN ORDER, INSORD: 3 UNITS/HOUR
INSULIN ORDER, INSORD: 3.7 UNITS/HOUR
LOW TARGET, LOT: 80 MG/DL
LYMPHOCYTES # BLD: 1.2 K/UL (ref 0.9–3.6)
LYMPHOCYTES NFR BLD: 10 % (ref 21–52)
MAGNESIUM SERPL-MCNC: 2.7 MG/DL (ref 1.6–2.6)
MCH RBC QN AUTO: 31.1 PG (ref 24–34)
MCHC RBC AUTO-ENTMCNC: 33.2 G/DL (ref 31–37)
MCV RBC AUTO: 93.5 FL (ref 74–97)
MINUTES UNTIL NEXT BG, NBG: 60 MIN
MONOCYTES # BLD: 0.7 K/UL (ref 0.05–1.2)
MONOCYTES NFR BLD: 6 % (ref 3–10)
MULTIPLIER, MUL: 0.02
MULTIPLIER, MUL: 0.03
MULTIPLIER, MUL: 0.04
NEUTS SEG # BLD: 9.4 K/UL (ref 1.8–8)
NEUTS SEG NFR BLD: 83 % (ref 40–73)
O2/TOTAL GAS SETTING VFR VENT: 100 %
O2/TOTAL GAS SETTING VFR VENT: 40 %
O2/TOTAL GAS SETTING VFR VENT: 40 %
O2/TOTAL GAS SETTING VFR VENT: 50 %
ORDER INITIALS, ORDINIT: NORMAL
PCO2 BLD: 39.5 MMHG (ref 35–45)
PCO2 BLD: 39.7 MMHG (ref 35–45)
PCO2 BLD: 40.1 MMHG (ref 35–45)
PCO2 BLD: 40.4 MMHG (ref 35–45)
PCO2 BLD: 40.5 MMHG (ref 35–45)
PCO2 BLD: 40.6 MMHG (ref 35–45)
PCO2 BLD: 40.8 MMHG (ref 35–45)
PCO2 BLD: 41.4 MMHG (ref 35–45)
PCO2 BLD: 42.3 MMHG (ref 35–45)
PCO2 BLD: 42.5 MMHG (ref 35–45)
PCO2 BLD: 42.7 MMHG (ref 35–45)
PEEP RESPIRATORY: 5 CMH2O
PH BLD: 7.33 [PH] (ref 7.35–7.45)
PH BLD: 7.37 [PH] (ref 7.35–7.45)
PH BLD: 7.38 [PH] (ref 7.35–7.45)
PH BLD: 7.38 [PH] (ref 7.35–7.45)
PH BLD: 7.39 [PH] (ref 7.35–7.45)
PH BLD: 7.41 [PH] (ref 7.35–7.45)
PH BLD: 7.42 [PH] (ref 7.35–7.45)
PH BLD: 7.43 [PH] (ref 7.35–7.45)
PH BLD: 7.44 [PH] (ref 7.35–7.45)
PLATELET # BLD AUTO: 146 K/UL (ref 135–420)
PMV BLD AUTO: 10 FL (ref 9.2–11.8)
PO2 BLD: 123 MMHG (ref 80–100)
PO2 BLD: 132 MMHG (ref 80–100)
PO2 BLD: 177 MMHG (ref 80–100)
PO2 BLD: 248 MMHG (ref 80–100)
PO2 BLD: 277 MMHG (ref 80–100)
PO2 BLD: 340 MMHG (ref 80–100)
PO2 BLD: 373 MMHG (ref 80–100)
PO2 BLD: 375 MMHG (ref 80–100)
PO2 BLD: 404 MMHG (ref 80–100)
PO2 BLD: 439 MMHG (ref 80–100)
PO2 BLD: 475 MMHG (ref 80–100)
POTASSIUM BLD-SCNC: 3.3 MMOL/L (ref 3.5–5.5)
POTASSIUM BLD-SCNC: 3.5 MMOL/L (ref 3.5–5.5)
POTASSIUM BLD-SCNC: 4.2 MMOL/L (ref 3.5–5.5)
POTASSIUM BLD-SCNC: 4.3 MMOL/L (ref 3.5–5.5)
POTASSIUM BLD-SCNC: 4.4 MMOL/L (ref 3.5–5.5)
POTASSIUM BLD-SCNC: 4.6 MMOL/L (ref 3.5–5.5)
POTASSIUM BLD-SCNC: 4.7 MMOL/L (ref 3.5–5.5)
POTASSIUM BLD-SCNC: 4.8 MMOL/L (ref 3.5–5.5)
POTASSIUM SERPL-SCNC: 3.6 MMOL/L (ref 3.5–5.5)
PRESSURE SUPPORT SETTING VENT: 7 CMH2O
PROTHROMBIN TIME: 15.3 SEC (ref 11.5–15.2)
RBC # BLD AUTO: 3.67 M/UL (ref 4.7–5.5)
SAO2 % BLD: 100 % (ref 92–97)
SAO2 % BLD: 99 % (ref 92–97)
SAO2 % BLD: 99 % (ref 92–97)
SERVICE CMNT-IMP: ABNORMAL
SODIUM BLD-SCNC: 134 MMOL/L (ref 136–145)
SODIUM BLD-SCNC: 134 MMOL/L (ref 136–145)
SODIUM BLD-SCNC: 135 MMOL/L (ref 136–145)
SODIUM BLD-SCNC: 135 MMOL/L (ref 136–145)
SODIUM BLD-SCNC: 136 MMOL/L (ref 136–145)
SODIUM BLD-SCNC: 137 MMOL/L (ref 136–145)
SODIUM BLD-SCNC: 138 MMOL/L (ref 136–145)
SODIUM BLD-SCNC: 138 MMOL/L (ref 136–145)
SODIUM SERPL-SCNC: 143 MMOL/L (ref 136–145)
SPECIMEN TYPE: ABNORMAL
TOTAL RESP. RATE, ITRR: 23
TOTAL RESP. RATE, ITRR: 25
VENTILATION MODE VENT: ABNORMAL
VOLUME CONTROL PLUS IVLCP: YES
VT SETTING VENT: 450 ML
WBC # BLD AUTO: 11.3 K/UL (ref 4.6–13.2)

## 2020-09-18 PROCEDURE — 82803 BLOOD GASES ANY COMBINATION: CPT

## 2020-09-18 PROCEDURE — 77030005530 HC CATH URETH FOL40 BARD -B: Performed by: SPECIALIST

## 2020-09-18 PROCEDURE — 77030002996 HC SUT SLK J&J -A: Performed by: SPECIALIST

## 2020-09-18 PROCEDURE — 85730 THROMBOPLASTIN TIME PARTIAL: CPT

## 2020-09-18 PROCEDURE — 77030022199 HC SYS HARV VESL GTNG -G1: Performed by: SPECIALIST

## 2020-09-18 PROCEDURE — 77030030163 HC BN WAX J&J -A: Performed by: SPECIALIST

## 2020-09-18 PROCEDURE — C1729 CATH, DRAINAGE: HCPCS | Performed by: SPECIALIST

## 2020-09-18 PROCEDURE — 77030013313: Performed by: SPECIALIST

## 2020-09-18 PROCEDURE — 77030018390 HC SPNG HEMSTAT2 J&J -B: Performed by: SPECIALIST

## 2020-09-18 PROCEDURE — 77030015683 HC ADPT CRDPLG MEDT -B: Performed by: SPECIALIST

## 2020-09-18 PROCEDURE — 77030026855 HC PNCH AORT MYO AEMC -B: Performed by: SPECIALIST

## 2020-09-18 PROCEDURE — 74011636637 HC RX REV CODE- 636/637: Performed by: ANESTHESIOLOGY

## 2020-09-18 PROCEDURE — 74011000258 HC RX REV CODE- 258: Performed by: SPECIALIST

## 2020-09-18 PROCEDURE — 77030040361 HC SLV COMPR DVT MDII -B: Performed by: SPECIALIST

## 2020-09-18 PROCEDURE — 74011000250 HC RX REV CODE- 250

## 2020-09-18 PROCEDURE — 77030012390 HC DRN CHST BTL GTNG -B: Performed by: SPECIALIST

## 2020-09-18 PROCEDURE — 74011250636 HC RX REV CODE- 250/636: Performed by: PHYSICIAN ASSISTANT

## 2020-09-18 PROCEDURE — 76060000041 HC ANESTHESIA 5 TO 5.5 HR: Performed by: SPECIALIST

## 2020-09-18 PROCEDURE — P9045 ALBUMIN (HUMAN), 5%, 250 ML: HCPCS | Performed by: PHYSICIAN ASSISTANT

## 2020-09-18 PROCEDURE — 77030002986 HC SUT PROL J&J -A: Performed by: SPECIALIST

## 2020-09-18 PROCEDURE — 74011000258 HC RX REV CODE- 258: Performed by: ANESTHESIOLOGY

## 2020-09-18 PROCEDURE — 06BP4ZZ EXCISION OF RIGHT SAPHENOUS VEIN, PERCUTANEOUS ENDOSCOPIC APPROACH: ICD-10-PCS | Performed by: SPECIALIST

## 2020-09-18 PROCEDURE — 77030016570 HC BLNKT BAIR HGGR 3M -B: Performed by: SPECIALIST

## 2020-09-18 PROCEDURE — 80048 BASIC METABOLIC PNL TOTAL CA: CPT

## 2020-09-18 PROCEDURE — 2709999900 HC NON-CHARGEABLE SUPPLY: Performed by: SPECIALIST

## 2020-09-18 PROCEDURE — 021209W BYPASS CORONARY ARTERY, THREE ARTERIES FROM AORTA WITH AUTOLOGOUS VENOUS TISSUE, OPEN APPROACH: ICD-10-PCS | Performed by: SPECIALIST

## 2020-09-18 PROCEDURE — 74011000250 HC RX REV CODE- 250: Performed by: PHYSICIAN ASSISTANT

## 2020-09-18 PROCEDURE — 74011250637 HC RX REV CODE- 250/637: Performed by: SPECIALIST

## 2020-09-18 PROCEDURE — 74011000250 HC RX REV CODE- 250: Performed by: SPECIALIST

## 2020-09-18 PROCEDURE — 94002 VENT MGMT INPAT INIT DAY: CPT

## 2020-09-18 PROCEDURE — 77030008875 HC ADAPT FEED BARD -A

## 2020-09-18 PROCEDURE — 74011250636 HC RX REV CODE- 250/636: Performed by: ANESTHESIOLOGY

## 2020-09-18 PROCEDURE — 77030010516 HC APPL HEMA CLP TELE -B: Performed by: SPECIALIST

## 2020-09-18 PROCEDURE — 82962 GLUCOSE BLOOD TEST: CPT

## 2020-09-18 PROCEDURE — 82947 ASSAY GLUCOSE BLOOD QUANT: CPT

## 2020-09-18 PROCEDURE — 74011000250 HC RX REV CODE- 250: Performed by: ANESTHESIOLOGY

## 2020-09-18 PROCEDURE — 85025 COMPLETE CBC W/AUTO DIFF WBC: CPT

## 2020-09-18 PROCEDURE — 74011250636 HC RX REV CODE- 250/636

## 2020-09-18 PROCEDURE — 2709999900 HC NON-CHARGEABLE SUPPLY

## 2020-09-18 PROCEDURE — 77030006247 HC LD PCMKR MYOCRD BPLR TEMP MEDT -B: Performed by: SPECIALIST

## 2020-09-18 PROCEDURE — 77030012890

## 2020-09-18 PROCEDURE — 77030003010 HC SUT SURG STL J&J -B: Performed by: SPECIALIST

## 2020-09-18 PROCEDURE — 74011000272 HC RX REV CODE- 272: Performed by: SPECIALIST

## 2020-09-18 PROCEDURE — 65620000000 HC RM CCU GENERAL

## 2020-09-18 PROCEDURE — 77030019896 HC KT ARTERIAL LN TELE -B: Performed by: SPECIALIST

## 2020-09-18 PROCEDURE — 77030031139 HC SUT VCRL2 J&J -A: Performed by: SPECIALIST

## 2020-09-18 PROCEDURE — 76010000198 HC CV SURG 5 TO 5.5 HR INTENSV-TIER 1: Performed by: SPECIALIST

## 2020-09-18 PROCEDURE — 77030027138 HC INCENT SPIROMETER -A

## 2020-09-18 PROCEDURE — 74011250637 HC RX REV CODE- 250/637: Performed by: PHYSICIAN ASSISTANT

## 2020-09-18 PROCEDURE — 77030010818: Performed by: SPECIALIST

## 2020-09-18 PROCEDURE — 77030020178 HC LP VESL TW QUES -B: Performed by: SPECIALIST

## 2020-09-18 PROCEDURE — 77030007667 HC INSRT SUT HLD MEDT -B: Performed by: SPECIALIST

## 2020-09-18 PROCEDURE — 77030002933 HC SUT MCRYL J&J -A: Performed by: SPECIALIST

## 2020-09-18 PROCEDURE — 74011000258 HC RX REV CODE- 258

## 2020-09-18 PROCEDURE — 77030019580 HC CBL PACE MEDT -B: Performed by: SPECIALIST

## 2020-09-18 PROCEDURE — 77030002912 HC SUT ETHBND J&J -A: Performed by: SPECIALIST

## 2020-09-18 PROCEDURE — 74011250636 HC RX REV CODE- 250/636: Performed by: SPECIALIST

## 2020-09-18 PROCEDURE — 85610 PROTHROMBIN TIME: CPT

## 2020-09-18 PROCEDURE — 02100Z9 BYPASS CORONARY ARTERY, ONE ARTERY FROM LEFT INTERNAL MAMMARY, OPEN APPROACH: ICD-10-PCS | Performed by: SPECIALIST

## 2020-09-18 PROCEDURE — 77030040922 HC BLNKT HYPOTHRM STRY -A: Performed by: SPECIALIST

## 2020-09-18 PROCEDURE — 93005 ELECTROCARDIOGRAM TRACING: CPT

## 2020-09-18 PROCEDURE — 36600 WITHDRAWAL OF ARTERIAL BLOOD: CPT

## 2020-09-18 PROCEDURE — 71045 X-RAY EXAM CHEST 1 VIEW: CPT

## 2020-09-18 PROCEDURE — 77030010929 HC CLMP VASC FGRTY EDWD -B: Performed by: SPECIALIST

## 2020-09-18 PROCEDURE — 83735 ASSAY OF MAGNESIUM: CPT

## 2020-09-18 PROCEDURE — 77030002987 HC SUT PROL J&J -B: Performed by: SPECIALIST

## 2020-09-18 RX ORDER — ACETAMINOPHEN 650 MG/1
SUPPOSITORY RECTAL AS NEEDED
Status: DISCONTINUED | OUTPATIENT
Start: 2020-09-18 | End: 2020-09-18 | Stop reason: HOSPADM

## 2020-09-18 RX ORDER — NITROGLYCERIN 40 MG/100ML
INJECTION INTRAVENOUS
Status: DISCONTINUED | OUTPATIENT
Start: 2020-09-18 | End: 2020-09-18 | Stop reason: HOSPADM

## 2020-09-18 RX ORDER — NALOXONE HYDROCHLORIDE 0.4 MG/ML
0.4 INJECTION, SOLUTION INTRAMUSCULAR; INTRAVENOUS; SUBCUTANEOUS AS NEEDED
Status: DISCONTINUED | OUTPATIENT
Start: 2020-09-18 | End: 2020-09-22 | Stop reason: HOSPADM

## 2020-09-18 RX ORDER — SODIUM CHLORIDE 9 MG/ML
INJECTION INTRAMUSCULAR; INTRAVENOUS; SUBCUTANEOUS
Status: DISPENSED
Start: 2020-09-18 | End: 2020-09-19

## 2020-09-18 RX ORDER — CALCIUM CHLORIDE INJECTION 100 MG/ML
INJECTION, SOLUTION INTRAVENOUS
Status: DISPENSED
Start: 2020-09-18 | End: 2020-09-18

## 2020-09-18 RX ORDER — ONDANSETRON 2 MG/ML
4 INJECTION INTRAMUSCULAR; INTRAVENOUS
Status: DISCONTINUED | OUTPATIENT
Start: 2020-09-18 | End: 2020-09-22 | Stop reason: HOSPADM

## 2020-09-18 RX ORDER — SODIUM CHLORIDE 0.9 % (FLUSH) 0.9 %
5-40 SYRINGE (ML) INJECTION EVERY 8 HOURS
Status: DISCONTINUED | OUTPATIENT
Start: 2020-09-18 | End: 2020-09-22 | Stop reason: HOSPADM

## 2020-09-18 RX ORDER — PAPAVERINE HYDROCHLORIDE 30 MG/ML
INJECTION INTRAMUSCULAR; INTRAVENOUS
Status: DISPENSED
Start: 2020-09-18 | End: 2020-09-18

## 2020-09-18 RX ORDER — PROPOFOL 10 MG/ML
INJECTION, EMULSION INTRAVENOUS
Status: COMPLETED
Start: 2020-09-18 | End: 2020-09-18

## 2020-09-18 RX ORDER — POLYETHYLENE GLYCOL 3350 17 G/17G
17 POWDER, FOR SOLUTION ORAL DAILY
Status: DISCONTINUED | OUTPATIENT
Start: 2020-09-19 | End: 2020-09-21

## 2020-09-18 RX ORDER — SUCCINYLCHOLINE CHLORIDE 20 MG/ML
INJECTION INTRAMUSCULAR; INTRAVENOUS AS NEEDED
Status: DISCONTINUED | OUTPATIENT
Start: 2020-09-18 | End: 2020-09-18 | Stop reason: HOSPADM

## 2020-09-18 RX ORDER — BACITRACIN 50000 [IU]/1
INJECTION, POWDER, FOR SOLUTION INTRAMUSCULAR
Status: DISPENSED
Start: 2020-09-18 | End: 2020-09-19

## 2020-09-18 RX ORDER — MUPIROCIN 20 MG/G
OINTMENT TOPICAL 2 TIMES DAILY
Status: DISPENSED | OUTPATIENT
Start: 2020-09-18 | End: 2020-09-20

## 2020-09-18 RX ORDER — SODIUM CHLORIDE 9 MG/ML
INJECTION, SOLUTION INTRAVENOUS
Status: DISCONTINUED | OUTPATIENT
Start: 2020-09-18 | End: 2020-09-18 | Stop reason: HOSPADM

## 2020-09-18 RX ORDER — LIDOCAINE HYDROCHLORIDE 20 MG/ML
INJECTION, SOLUTION EPIDURAL; INFILTRATION; INTRACAUDAL; PERINEURAL AS NEEDED
Status: DISCONTINUED | OUTPATIENT
Start: 2020-09-18 | End: 2020-09-18 | Stop reason: HOSPADM

## 2020-09-18 RX ORDER — ALBUTEROL SULFATE 0.83 MG/ML
2.5 SOLUTION RESPIRATORY (INHALATION)
Status: DISCONTINUED | OUTPATIENT
Start: 2020-09-18 | End: 2020-09-22 | Stop reason: HOSPADM

## 2020-09-18 RX ORDER — ASPIRIN 81 MG/1
81 TABLET ORAL DAILY
Status: DISCONTINUED | OUTPATIENT
Start: 2020-09-19 | End: 2020-09-22 | Stop reason: HOSPADM

## 2020-09-18 RX ORDER — VANCOMYCIN HYDROCHLORIDE 1 G/20ML
INJECTION, POWDER, LYOPHILIZED, FOR SOLUTION INTRAVENOUS
Status: DISPENSED
Start: 2020-09-18 | End: 2020-09-18

## 2020-09-18 RX ORDER — CEFAZOLIN SODIUM 2 G/50ML
2 SOLUTION INTRAVENOUS
Status: COMPLETED | OUTPATIENT
Start: 2020-09-18 | End: 2020-09-18

## 2020-09-18 RX ORDER — FENTANYL CITRATE 50 UG/ML
12.5-25 INJECTION, SOLUTION INTRAMUSCULAR; INTRAVENOUS
Status: DISPENSED | OUTPATIENT
Start: 2020-09-18 | End: 2020-09-19

## 2020-09-18 RX ORDER — ACETAMINOPHEN 650 MG/1
SUPPOSITORY RECTAL
Status: DISPENSED
Start: 2020-09-18 | End: 2020-09-18

## 2020-09-18 RX ORDER — VANCOMYCIN HYDROCHLORIDE 1 G/20ML
INJECTION, POWDER, LYOPHILIZED, FOR SOLUTION INTRAVENOUS AS NEEDED
Status: DISCONTINUED | OUTPATIENT
Start: 2020-09-18 | End: 2020-09-18 | Stop reason: HOSPADM

## 2020-09-18 RX ORDER — POTASSIUM CHLORIDE 14.9 MG/ML
10 INJECTION INTRAVENOUS
Status: COMPLETED | OUTPATIENT
Start: 2020-09-18 | End: 2020-09-18

## 2020-09-18 RX ORDER — HEPARIN SODIUM 1000 [USP'U]/ML
INJECTION, SOLUTION INTRAVENOUS; SUBCUTANEOUS AS NEEDED
Status: DISCONTINUED | OUTPATIENT
Start: 2020-09-18 | End: 2020-09-18 | Stop reason: HOSPADM

## 2020-09-18 RX ORDER — PROPOFOL 10 MG/ML
VIAL (ML) INTRAVENOUS
Status: DISCONTINUED | OUTPATIENT
Start: 2020-09-18 | End: 2020-09-18 | Stop reason: HOSPADM

## 2020-09-18 RX ORDER — SODIUM CHLORIDE, SODIUM LACTATE, POTASSIUM CHLORIDE, CALCIUM CHLORIDE 600; 310; 30; 20 MG/100ML; MG/100ML; MG/100ML; MG/100ML
75 INJECTION, SOLUTION INTRAVENOUS CONTINUOUS
Status: DISCONTINUED | OUTPATIENT
Start: 2020-09-18 | End: 2020-09-18

## 2020-09-18 RX ORDER — MORPHINE SULFATE 4 MG/ML
2-4 INJECTION, SOLUTION INTRAMUSCULAR; INTRAVENOUS
Status: DISPENSED | OUTPATIENT
Start: 2020-09-18 | End: 2020-09-19

## 2020-09-18 RX ORDER — SODIUM CHLORIDE 0.9 % (FLUSH) 0.9 %
5-40 SYRINGE (ML) INJECTION AS NEEDED
Status: DISCONTINUED | OUTPATIENT
Start: 2020-09-18 | End: 2020-09-22 | Stop reason: HOSPADM

## 2020-09-18 RX ORDER — BUSPIRONE HYDROCHLORIDE 5 MG/1
15 TABLET ORAL 2 TIMES DAILY
Status: DISCONTINUED | OUTPATIENT
Start: 2020-09-18 | End: 2020-09-19

## 2020-09-18 RX ORDER — AMIODARONE HYDROCHLORIDE 200 MG/1
200 TABLET ORAL 3 TIMES DAILY
Status: DISCONTINUED | OUTPATIENT
Start: 2020-09-18 | End: 2020-09-22 | Stop reason: HOSPADM

## 2020-09-18 RX ORDER — BACITRACIN 50000 [IU]/1
INJECTION, POWDER, FOR SOLUTION INTRAMUSCULAR
Status: DISCONTINUED
Start: 2020-09-18 | End: 2020-09-18 | Stop reason: SDUPTHER

## 2020-09-18 RX ORDER — ROCURONIUM BROMIDE 10 MG/ML
INJECTION, SOLUTION INTRAVENOUS AS NEEDED
Status: DISCONTINUED | OUTPATIENT
Start: 2020-09-18 | End: 2020-09-18 | Stop reason: HOSPADM

## 2020-09-18 RX ORDER — SODIUM CHLORIDE 9 MG/ML
INJECTION INTRAMUSCULAR; INTRAVENOUS; SUBCUTANEOUS
Status: DISPENSED
Start: 2020-09-18 | End: 2020-09-18

## 2020-09-18 RX ORDER — MAGNESIUM SULFATE 100 %
4 CRYSTALS MISCELLANEOUS AS NEEDED
Status: DISCONTINUED | OUTPATIENT
Start: 2020-09-18 | End: 2020-09-20

## 2020-09-18 RX ORDER — PROTAMINE SULFATE 10 MG/ML
INJECTION, SOLUTION INTRAVENOUS AS NEEDED
Status: DISCONTINUED | OUTPATIENT
Start: 2020-09-18 | End: 2020-09-18 | Stop reason: HOSPADM

## 2020-09-18 RX ORDER — CEFAZOLIN SODIUM 2 G/50ML
2 SOLUTION INTRAVENOUS EVERY 8 HOURS
Status: COMPLETED | OUTPATIENT
Start: 2020-09-18 | End: 2020-09-19

## 2020-09-18 RX ORDER — CALCIUM GLUCONATE 94 MG/ML
INJECTION, SOLUTION INTRAVENOUS
Status: COMPLETED
Start: 2020-09-18 | End: 2020-09-18

## 2020-09-18 RX ORDER — MIDAZOLAM HYDROCHLORIDE 1 MG/ML
INJECTION, SOLUTION INTRAMUSCULAR; INTRAVENOUS AS NEEDED
Status: DISCONTINUED | OUTPATIENT
Start: 2020-09-18 | End: 2020-09-18 | Stop reason: HOSPADM

## 2020-09-18 RX ORDER — NITROGLYCERIN 40 MG/100ML
0-200 INJECTION INTRAVENOUS
Status: DISCONTINUED | OUTPATIENT
Start: 2020-09-18 | End: 2020-09-19

## 2020-09-18 RX ORDER — DOCUSATE SODIUM 100 MG/1
100 CAPSULE, LIQUID FILLED ORAL 2 TIMES DAILY
Status: DISCONTINUED | OUTPATIENT
Start: 2020-09-18 | End: 2020-09-22 | Stop reason: HOSPADM

## 2020-09-18 RX ORDER — ATORVASTATIN CALCIUM 40 MG/1
80 TABLET, FILM COATED ORAL EVERY EVENING
Status: DISCONTINUED | OUTPATIENT
Start: 2020-09-18 | End: 2020-09-22 | Stop reason: HOSPADM

## 2020-09-18 RX ORDER — FAMOTIDINE 20 MG/1
20 TABLET, FILM COATED ORAL 2 TIMES DAILY
Status: DISCONTINUED | OUTPATIENT
Start: 2020-09-19 | End: 2020-09-22 | Stop reason: HOSPADM

## 2020-09-18 RX ORDER — PROPOFOL 10 MG/ML
0-50 VIAL (ML) INTRAVENOUS
Status: DISCONTINUED | OUTPATIENT
Start: 2020-09-18 | End: 2020-09-19

## 2020-09-18 RX ORDER — DEXTROSE 50 % IN WATER (D50W) INTRAVENOUS SYRINGE
25-50 AS NEEDED
Status: DISCONTINUED | OUTPATIENT
Start: 2020-09-18 | End: 2020-09-20

## 2020-09-18 RX ORDER — FENTANYL CITRATE 50 UG/ML
INJECTION, SOLUTION INTRAMUSCULAR; INTRAVENOUS AS NEEDED
Status: DISCONTINUED | OUTPATIENT
Start: 2020-09-18 | End: 2020-09-18 | Stop reason: HOSPADM

## 2020-09-18 RX ORDER — AMINOCAPROIC ACID 250 MG/ML
INJECTION, SOLUTION INTRAVENOUS AS NEEDED
Status: DISCONTINUED | OUTPATIENT
Start: 2020-09-18 | End: 2020-09-18 | Stop reason: HOSPADM

## 2020-09-18 RX ORDER — ETOMIDATE 2 MG/ML
INJECTION INTRAVENOUS AS NEEDED
Status: DISCONTINUED | OUTPATIENT
Start: 2020-09-18 | End: 2020-09-18 | Stop reason: HOSPADM

## 2020-09-18 RX ORDER — HEPARIN SODIUM 1000 [USP'U]/ML
INJECTION, SOLUTION INTRAVENOUS; SUBCUTANEOUS
Status: DISPENSED
Start: 2020-09-18 | End: 2020-09-18

## 2020-09-18 RX ORDER — BISACODYL 5 MG
10 TABLET, DELAYED RELEASE (ENTERIC COATED) ORAL DAILY
Status: DISCONTINUED | OUTPATIENT
Start: 2020-09-19 | End: 2020-09-22 | Stop reason: HOSPADM

## 2020-09-18 RX ORDER — SODIUM CHLORIDE 9 MG/ML
75 INJECTION, SOLUTION INTRAVENOUS CONTINUOUS
Status: DISCONTINUED | OUTPATIENT
Start: 2020-09-18 | End: 2020-09-21

## 2020-09-18 RX ORDER — ALBUMIN HUMAN 50 G/1000ML
12.5 SOLUTION INTRAVENOUS AS NEEDED
Status: COMPLETED | OUTPATIENT
Start: 2020-09-18 | End: 2020-09-18

## 2020-09-18 RX ORDER — ACETAMINOPHEN 325 MG/1
650 TABLET ORAL
Status: DISCONTINUED | OUTPATIENT
Start: 2020-09-18 | End: 2020-09-22 | Stop reason: HOSPADM

## 2020-09-18 RX ORDER — METOPROLOL TARTRATE 25 MG/1
12.5 TABLET, FILM COATED ORAL EVERY 12 HOURS
Status: DISCONTINUED | OUTPATIENT
Start: 2020-09-19 | End: 2020-09-20

## 2020-09-18 RX ORDER — MANNITOL 20 G/100ML
INJECTION, SOLUTION INTRAVENOUS
Status: DISCONTINUED
Start: 2020-09-18 | End: 2020-09-18 | Stop reason: WASHOUT

## 2020-09-18 RX ORDER — CHLORHEXIDINE GLUCONATE 1.2 MG/ML
10 RINSE ORAL 2 TIMES DAILY
Status: DISCONTINUED | OUTPATIENT
Start: 2020-09-18 | End: 2020-09-22 | Stop reason: HOSPADM

## 2020-09-18 RX ORDER — HEPARIN SODIUM 1000 [USP'U]/ML
INJECTION, SOLUTION INTRAVENOUS; SUBCUTANEOUS
Status: DISCONTINUED | OUTPATIENT
Start: 2020-09-18 | End: 2020-09-18

## 2020-09-18 RX ORDER — ENOXAPARIN SODIUM 100 MG/ML
40 INJECTION SUBCUTANEOUS EVERY 24 HOURS
Status: DISCONTINUED | OUTPATIENT
Start: 2020-09-20 | End: 2020-09-22 | Stop reason: HOSPADM

## 2020-09-18 RX ORDER — HYDROCODONE BITARTRATE AND ACETAMINOPHEN 5; 325 MG/1; MG/1
1-2 TABLET ORAL
Status: DISCONTINUED | OUTPATIENT
Start: 2020-09-18 | End: 2020-09-19

## 2020-09-18 RX ORDER — SODIUM CHLORIDE, SODIUM LACTATE, POTASSIUM CHLORIDE, CALCIUM CHLORIDE 600; 310; 30; 20 MG/100ML; MG/100ML; MG/100ML; MG/100ML
125 INJECTION, SOLUTION INTRAVENOUS CONTINUOUS
Status: DISCONTINUED | OUTPATIENT
Start: 2020-09-18 | End: 2020-09-18 | Stop reason: HOSPADM

## 2020-09-18 RX ADMIN — CEFAZOLIN SODIUM 2 G: 2 SOLUTION INTRAVENOUS at 23:00

## 2020-09-18 RX ADMIN — HYDROCODONE BITARTRATE AND ACETAMINOPHEN 2 TABLET: 5; 325 TABLET ORAL at 17:49

## 2020-09-18 RX ADMIN — SODIUM CHLORIDE 1 G/HR: 9 INJECTION, SOLUTION INTRAVENOUS at 10:24

## 2020-09-18 RX ADMIN — FENTANYL CITRATE 50 MCG: 50 INJECTION, SOLUTION INTRAMUSCULAR; INTRAVENOUS at 13:03

## 2020-09-18 RX ADMIN — HUMAN INSULIN 3 UNITS: 100 INJECTION, SOLUTION SUBCUTANEOUS at 12:00

## 2020-09-18 RX ADMIN — MIDAZOLAM HYDROCHLORIDE 1 MG: 2 INJECTION, SOLUTION INTRAMUSCULAR; INTRAVENOUS at 13:14

## 2020-09-18 RX ADMIN — LIDOCAINE HYDROCHLORIDE 60 MG: 20 INJECTION, SOLUTION EPIDURAL; INFILTRATION; INTRACAUDAL; PERINEURAL at 08:17

## 2020-09-18 RX ADMIN — POTASSIUM CHLORIDE 10 MEQ: 14.9 INJECTION, SOLUTION INTRAVENOUS at 15:54

## 2020-09-18 RX ADMIN — POTASSIUM CHLORIDE 10 MEQ: 14.9 INJECTION, SOLUTION INTRAVENOUS at 16:52

## 2020-09-18 RX ADMIN — FENTANYL CITRATE 25 MCG: 50 INJECTION, SOLUTION INTRAMUSCULAR; INTRAVENOUS at 19:58

## 2020-09-18 RX ADMIN — ATORVASTATIN CALCIUM 80 MG: 40 TABLET, FILM COATED ORAL at 17:50

## 2020-09-18 RX ADMIN — MIDAZOLAM HYDROCHLORIDE 2 MG: 2 INJECTION, SOLUTION INTRAMUSCULAR; INTRAVENOUS at 08:05

## 2020-09-18 RX ADMIN — MORPHINE SULFATE 4 MG: 4 INJECTION, SOLUTION INTRAMUSCULAR; INTRAVENOUS at 19:30

## 2020-09-18 RX ADMIN — AMIODARONE HYDROCHLORIDE 200 MG: 200 TABLET ORAL at 22:57

## 2020-09-18 RX ADMIN — ETOMIDATE 12 MG: 2 INJECTION, SOLUTION INTRAVENOUS at 08:16

## 2020-09-18 RX ADMIN — FENTANYL CITRATE 100 MCG: 50 INJECTION, SOLUTION INTRAMUSCULAR; INTRAVENOUS at 09:27

## 2020-09-18 RX ADMIN — CHLORHEXIDINE GLUCONATE 0.12% ORAL RINSE 10 ML: 1.2 LIQUID ORAL at 17:49

## 2020-09-18 RX ADMIN — DOCUSATE SODIUM 100 MG: 100 CAPSULE, LIQUID FILLED ORAL at 17:49

## 2020-09-18 RX ADMIN — AMINOCAPROIC ACID 5 G: 250 INJECTION, SOLUTION INTRAVENOUS at 10:24

## 2020-09-18 RX ADMIN — ROCURONIUM BROMIDE 20 MG: 50 INJECTION INTRAVENOUS at 09:29

## 2020-09-18 RX ADMIN — SODIUM CHLORIDE 2 UNITS/HR: 0.9 INJECTION INTRAVENOUS at 10:24

## 2020-09-18 RX ADMIN — POTASSIUM CHLORIDE 10 MEQ: 14.9 INJECTION, SOLUTION INTRAVENOUS at 17:51

## 2020-09-18 RX ADMIN — Medication 10 ML: at 17:28

## 2020-09-18 RX ADMIN — PHENYLEPHRINE HYDROCHLORIDE 5 MCG/MIN: 10 INJECTION INTRAVENOUS at 19:40

## 2020-09-18 RX ADMIN — MIDAZOLAM HYDROCHLORIDE 1 MG: 2 INJECTION, SOLUTION INTRAMUSCULAR; INTRAVENOUS at 10:32

## 2020-09-18 RX ADMIN — BUSPIRONE HYDROCHLORIDE 15 MG: 5 TABLET ORAL at 17:49

## 2020-09-18 RX ADMIN — FENTANYL CITRATE 50 MCG: 50 INJECTION, SOLUTION INTRAMUSCULAR; INTRAVENOUS at 08:15

## 2020-09-18 RX ADMIN — PROTAMINE SULFATE 10 MG: 10 INJECTION, SOLUTION INTRAVENOUS at 12:25

## 2020-09-18 RX ADMIN — HUMAN INSULIN 5 UNITS: 100 INJECTION, SOLUTION SUBCUTANEOUS at 11:34

## 2020-09-18 RX ADMIN — Medication 10 ML: at 22:00

## 2020-09-18 RX ADMIN — PROPOFOL 25 MCG/KG/MIN: 10 INJECTION, EMULSION INTRAVENOUS at 11:05

## 2020-09-18 RX ADMIN — ALBUMIN (HUMAN) 12.5 G: 12.5 INJECTION, SOLUTION INTRAVENOUS at 18:56

## 2020-09-18 RX ADMIN — PROTAMINE SULFATE 200 MG: 10 INJECTION, SOLUTION INTRAVENOUS at 12:40

## 2020-09-18 RX ADMIN — ROCURONIUM BROMIDE 50 MG: 50 INJECTION INTRAVENOUS at 08:30

## 2020-09-18 RX ADMIN — SODIUM CHLORIDE, SODIUM LACTATE, POTASSIUM CHLORIDE, AND CALCIUM CHLORIDE: 600; 310; 30; 20 INJECTION, SOLUTION INTRAVENOUS at 08:08

## 2020-09-18 RX ADMIN — ROCURONIUM BROMIDE 20 MG: 50 INJECTION INTRAVENOUS at 11:16

## 2020-09-18 RX ADMIN — ALBUMIN (HUMAN) 12.5 G: 12.5 INJECTION, SOLUTION INTRAVENOUS at 15:35

## 2020-09-18 RX ADMIN — FENTANYL CITRATE 50 MCG: 50 INJECTION, SOLUTION INTRAMUSCULAR; INTRAVENOUS at 12:46

## 2020-09-18 RX ADMIN — CALCIUM GLUCONATE 1 G: 98 INJECTION, SOLUTION INTRAVENOUS at 15:43

## 2020-09-18 RX ADMIN — ALBUMIN (HUMAN) 12.5 G: 12.5 INJECTION, SOLUTION INTRAVENOUS at 13:51

## 2020-09-18 RX ADMIN — ONDANSETRON 4 MG: 2 INJECTION INTRAMUSCULAR; INTRAVENOUS at 17:48

## 2020-09-18 RX ADMIN — CEFAZOLIN SODIUM 2 G: 2 SOLUTION INTRAVENOUS at 08:55

## 2020-09-18 RX ADMIN — SODIUM CHLORIDE 50000 UNITS: 900 INJECTION, SOLUTION INTRAVENOUS at 12:50

## 2020-09-18 RX ADMIN — FAMOTIDINE 20 MG: 10 INJECTION, SOLUTION INTRAVENOUS at 16:02

## 2020-09-18 RX ADMIN — FENTANYL CITRATE 50 MCG: 50 INJECTION, SOLUTION INTRAMUSCULAR; INTRAVENOUS at 08:45

## 2020-09-18 RX ADMIN — FENTANYL CITRATE 25 MCG: 50 INJECTION, SOLUTION INTRAMUSCULAR; INTRAVENOUS at 22:06

## 2020-09-18 RX ADMIN — AMIODARONE HYDROCHLORIDE 200 MG: 200 TABLET ORAL at 16:47

## 2020-09-18 RX ADMIN — HEPARIN SODIUM 25000 UNITS: 1000 INJECTION, SOLUTION INTRAVENOUS; SUBCUTANEOUS at 10:09

## 2020-09-18 RX ADMIN — SUCCINYLCHOLINE CHLORIDE 120 MG: 20 INJECTION, SOLUTION INTRAMUSCULAR; INTRAVENOUS at 08:17

## 2020-09-18 RX ADMIN — PHENYLEPHRINE HYDROCHLORIDE 10 MCG/MIN: 10 INJECTION INTRAVENOUS at 08:20

## 2020-09-18 RX ADMIN — PROPOFOL 35 MCG/KG/MIN: 10 INJECTION, EMULSION INTRAVENOUS at 14:03

## 2020-09-18 RX ADMIN — HUMAN INSULIN 2.5 UNITS: 100 INJECTION, SOLUTION SUBCUTANEOUS at 14:04

## 2020-09-18 RX ADMIN — CEFAZOLIN SODIUM 2 G: 2 SOLUTION INTRAVENOUS at 16:53

## 2020-09-18 RX ADMIN — EPINEPHRINE 2 MCG/MIN: 1 INJECTION INTRAMUSCULAR; INTRAVENOUS; SUBCUTANEOUS at 12:03

## 2020-09-18 RX ADMIN — SODIUM CHLORIDE 10 ML/HR: 900 INJECTION, SOLUTION INTRAVENOUS at 14:30

## 2020-09-18 RX ADMIN — POTASSIUM CHLORIDE 10 MEQ: 14.9 INJECTION, SOLUTION INTRAVENOUS at 19:15

## 2020-09-18 RX ADMIN — NITROGLYCERIN 20 MCG/MIN: 40 INJECTION INTRAVENOUS at 08:45

## 2020-09-18 RX ADMIN — SODIUM CHLORIDE: 900 INJECTION, SOLUTION INTRAVENOUS at 08:35

## 2020-09-18 RX ADMIN — FENTANYL CITRATE 50 MCG: 50 INJECTION, SOLUTION INTRAMUSCULAR; INTRAVENOUS at 08:17

## 2020-09-18 RX ADMIN — FENTANYL CITRATE 25 MCG: 50 INJECTION, SOLUTION INTRAMUSCULAR; INTRAVENOUS at 17:48

## 2020-09-18 RX ADMIN — ACETAMINOPHEN 650 MG: 325 TABLET ORAL at 19:58

## 2020-09-18 NOTE — OP NOTES
CARDIAC SURGERY OPERATIVE NOTE     09/18/20       PREOPERATIVE DIAGNOSIS:  Coronary artery disease with unstable angina     POSTOPERATIVE DIAGNOSIS:  Coronary artery disease with unstable angina    PROCEDURE:      1. Coronary artery bypass grafting x4 with the left internal mammary artery to the mid left anterior descending coronary artery, SVG to obtuse marginal, SVG to RPLV, and SVG to diagonal    2. Endoscopic SVG harvest         ATTENDING SURGEON:  Rhona Escobedo MD        ASSISTANT:  Guadalupe Garcia, SMITHA, [facility]     ANESTHESIA:  General with endotracheal tube. ANESTHESIOLOGIST: Michaela Garcia MD     ESTIMATED BLOOD LOSS: not applicable (cardiopulmonary bypass)     IMPLANTS: none      INDICATIONS: The patient is a 72 y.o. male who presents with unstable angina. His work-up led to cardiac catheterization which revealed severe three-vessel coronary artery disease. He was given the [facility], or to follow-up with us very shortly after being discharged from [facility] [place] [facility]\"". He was deemed stable enough to allow this, and he opted to schedule this as an outpatient. PROCEDURE:  He was taken to the operating room and was placed on the table in supine position, and after being placed under general anesthesia, was intubated without difficulty. Appropriate monitoring lines were then placed, including a radial  arterial line, a pulmonary artery catheter, a Wilkins catheter, and a transesophegeal echo probe. He was then prepped and draped in sterile fashion from neck to toes. A full Time Out was performed and the Cardiac Surgery Operative Checklist was then reviewed, which included confirming the patient's identity and planned operation, and also that the preoperative antibiotics were appropriate and had been delivered in appropriate timely fashion. A median sternotomy incision was then performed with a sternal saw. The left internal mammary artery was then exposed by a Rultract retractor and then harvested and was divided distally after systemic heparinization. The LIMA appeared to be of good quality. It was sprayed with a papaverine solution and then placed in a sponge soaked with the same solution. Simultaneously, endoscopic SVG harvesting was performed on the right lower extremity. he was then cannulated for cardiopulmonary bypass using a single cannula in the distal ascending aorta through double purse-string sutures; and a single two-staged cannula was placed into the right atrium through a single purse-string suture, all of which were secured by tourniquets. An antegrade cardioplegia cannula was placed into the ascending aorta and was secured by a tourniquet. After confirming that the ACT was greater than 400 seconds, the patient was placed on cardiopulmonary bypass and cooled tto 34 degrees Celsius. A cross clamp was then placed on the distal ascending aorta, and I liter of cardioplegia was given. Additional doses were given as appropriate. Topical cooling was utilized as well. The first distal anastomosis was SVG to the obtuse marginal.  This was a heavily diseased vessel and was barely visible on cardiac catheterization. However it appeared large enough to graft when viewed epicardially. I palpated for a soft portion of the vessel and open the artery there. An end to side anastomosis was completed using 7-0 Prolene. Cardioplegia was administered down this graft and a flow rate of 50 cc/min was noted. The vein was then brought up towards the aorta, divided, and prepared for anastomosis. An aortotomy was made and the proximal anastomosis was completed with 6-0 Prolene. The sequence of events was then repeated with both the R PLV and diagonal arteries. Both of these vessels were significantly better targets and were relatively spared from calcification.   The grafts were performed separately and both had separate proximal anastomoses. After completing the distal anastomoses, flow rates of 90 cc/min were noted in both grafts. The left internal mammary artery was then anastomosed end-to-side to the mid left anterior descending coronary artery, using 7-0 Prolene running suture. There were multiple lesions throughout the LAD and the second most distal lesion was in an area at which an anastomosis would typically be placed. In the interest of maximizing the benefit of the LIMA to LAD anastomosis, I opted to divide the artery just distal to the stenosis but to continue the arteriotomy up onto the area that have been stenosed. The area was then probed and a 1.5 mm probe was easily passed proximally. The anastomosis was then completed over this area. This can affect \"bridged\" that lesion in the interest of maximizing proximal and distal flow. There was a very distal LAD lesion that I considered grafting with saphenous vein. However, upon inspection this would have required grafting very near the apex and the vessel felt quite calcified in the vicinity as well. I opted to not attempt to graft the apex. The LIMA had excellent flow. It was tacked down with 6-0s. The patient had been rewarmed by this time. He was weaned off of CPB easily. The JOLIE showed improved ventricular function. The anesthesiologist estimated the ejection fraction to be 50% preoperatively and 70% postoperatively. Of note, the patient was bradycardic prior to starting the case. His pulse rate was in the low 40s. I therefore used epicardial pacing to wean the patient off bypass. Atrial and ventricular wires were placed. The patient was able to atrial paced and we kept him at a rate of 70-80 throughout the remainder of the case. Decannulation and protamine administration were performed in the usual fashion. Chest tubes  were placed. The chest was thoroughly irrigated. Hemostasis was ensured.  Closure preceded in standard fashion. I was present in the operating room from the time the patient arrived until the patient left the room and performed the entire procedure as dictated above. The cross-clamp time was 88 minutes. The cardiopulmonary bypass time was  113 minutes.     Mitzy Owen MD [facility]  Chief, Cardiothoracic Surgery   [facility]  [phone]

## 2020-09-18 NOTE — DIABETES MGMT
Glycemic Control Plan of Care    Noted history of T2DM with current A1c of 7.1% (9/03/2020)   Pending assessment of home diabetes management and educational needs post op when patient is awake and alert. Home diabetes medications: None listed. Recommendation(s): follow GlucoStabilizer protocol including criteria for transition to SC insulin    Glycemic assessment:    Patient is s/p CABG x4 today, 9/18/2020 and placed on regular insulin drip via GlucoStabilzer. BG target is         Current A1C: 7.1% (9/03/2020) which is equivalent to estimated average blood glucose of 157 mg/dL during the past 2-3 months    Current hospital diabetes medications:  Regular insulin drip via GlucoStabilizer. Drip rate at time of review: 3 units/hr    Total daily dose insulin requirement previous day: Patient admitted 9/18/2020    Diet: clear liquid; no concentrated sweets    Goals:  Blood glucose will be within target range of  mg/dL by 9/21/2020    Education:  Pending assessment. Patient had CABG today.   ___  Refer to Diabetes Education Record  ___  Education not indicated at this time    Jose Busby RN [facility]  Pager: [phone]

## 2020-09-18 NOTE — PROGRESS NOTES
09/18/20 1745   Weaning Parameters   Spontaneous Breathing Trial Complete   (Start of SBT)   Resp Rate Observed 36   Ve 8.7      RSBI 124

## 2020-09-18 NOTE — ANESTHESIA PROCEDURE NOTES
Central Line Placement    Start time: 9/18/2020 8:20 AM  End time: 9/18/2020 8:38 AM  Performed by: Xander Ledesma  Authorized by: Darlyn Appiah MD     Indications: vascular access, central pressure monitoring and need for vasopressors  Preanesthetic Checklist: patient identified, risks and benefits discussed, anesthesia consent, site marked, patient being monitored and timeout performed    Timeout Time: 08:20       Pre-procedure: All elements of maximal sterile barrier technique followed? Yes    2% Chlorhexidine for cutaneous antisepsis, Hand hygiene performed prior to catheter insertion and Ultrasound guidance    Sterile Ultrasound Technique followed?: Yes            Procedure:   Prep:  ChloraPrep  Location: internal jugular  Orientation:  Right  Patient position:  Trendelenburg  Number of lumens: Cordis.       Number of attempts:  1  Successful placement: Yes      Assessment:   Post-procedure:  Catheter secured, sterile dressing applied and sterile dressing with CHG applied  Assessment:  Free fluid flow, blood return through all ports and guidewire removal verified  Insertion:  Uncomplicated  Patient tolerance:  Patient tolerated the procedure well with no immediate complications

## 2020-09-18 NOTE — ROUTINE PROCESS
TRANSFER - OUT REPORT: 
 
Verbal report given to Silvia Goode RN on Latosha Crandall  being transferred to CVT ICU for routine post - op Report consisted of patients Situation, Background, Assessment and  
Recommendations(SBAR). Information from the following report(s) SBAR, Kardex, OR Summary, Procedure Summary, MAR and Recent Results was reviewed with the receiving nurse. Lines:  
Single Lumen Venous Catheter Cordis 09/18/20 Anterior;Right Neck (Active) Vester Crape Triple 09/18/20 Right Neck (Active) Peripheral IV 09/18/20 Left Arm (Active) Site Assessment Clean, dry, & intact 09/18/20 0935 Phlebitis Assessment 0 09/18/20 0935 Infiltration Assessment 0 09/18/20 0935 Dressing Status Clean, dry, & intact 09/18/20 0935 Dressing Type Transparent 09/18/20 0935 Hub Color/Line Status Pink 09/18/20 0935 Alcohol Cap Used No 09/18/20 0723 Arterial Line 09/18/20 Left Radial artery (Active) Opportunity for questions and clarification was provided. Patient transported with: 
 Monitor Registered Nurse

## 2020-09-18 NOTE — ANESTHESIA PREPROCEDURE EVALUATION
Relevant Problems   No relevant active problems       Anesthetic History   No history of anesthetic complications            Review of Systems / Medical History  Patient summary reviewed, nursing notes reviewed and pertinent labs reviewed    Pulmonary            Asthma        Neuro/Psych             Comments: 09/2020  There is no evidence of a hemodynamically significant arterial stenosis in either internal carotid artery Cardiovascular    Hypertension          Past MI, CAD and hyperlipidemia      Comments: 09/2020 ECHO  Estimated LVEF is 55 - 60%   GI/Hepatic/Renal     GERD: well controlled           Endo/Other    Diabetes         Other Findings            Physical Exam    Airway  Mallampati: III  TM Distance: 4 - 6 cm  Neck ROM: normal range of motion   Mouth opening: Normal     Cardiovascular    Rhythm: regular           Dental    Dentition: Poor dentition     Pulmonary  Breath sounds clear to auscultation               Abdominal  GI exam deferred       Other Findings            Anesthetic Plan    ASA: 4  Anesthesia type: general    Monitoring Plan: Arterial line, BIS, CVP, [place] and JOLIE        Anesthetic plan and risks discussed with: Patient

## 2020-09-18 NOTE — ANESTHESIA PROCEDURE NOTES
Arterial Line Placement    Start time: 9/18/2020 8:10 AM  End time: 9/18/2020 8:20 AM  Performed by: Porsche Gallagher  Authorized by: Madyson Farooq MD     Pre-Procedure  Indications:  Arterial pressure monitoring and blood sampling  Preanesthetic Checklist: patient identified, risks and benefits discussed, anesthesia consent, site marked, patient being monitored, timeout performed and patient being monitored    Timeout Time: 08:10        Procedure:   Prep:  ChloraPrep  Seldinger Technique?: Yes    Orientation:  Left  Location:  Radial artery  Catheter size:  20 G  Number of attempts:  1  Cont Cardiac Output Sensor: No      Assessment:   Post-procedure:  Line secured and sterile dressing applied  Patient Tolerance:  Patient tolerated the procedure well with no immediate complications

## 2020-09-18 NOTE — PROGRESS NOTES
12:55  Report received from Pedro Monk RN.    13:00  Dr. Karen Vick updated this RN on pt. Condition  13:05  Phoned next-of-kin, Author Shelia [phone] and updated her on pt's over-the-phone information code #. Also updated that this RN will call her when pt is either extubated or [id number]. 13:36  Received pt from OR to rm 2354. Small amount of serosanguinous drainage to chest tube dressing (right of midline) and shadowing on sternal dressing. RLE with compression ace wrap. Palpable pedal pulses sara. Pt. With propofol infusing at 25 mcg/kg/min. Epinephrine infusing at 1 mcg/kgmin. Amicar infusing at 1 gm per hour. Pt. Pacing at 70 bpm AAI (pt. With both A/V wires)  14:00  Pt. Awake after chest xray - increased propofol to 35 mcg/kg/min. 16:30  Repeat ABG results called to Colette Figueroa, RT. Will begin weaning by turning off sedation. 16:35  Fi02 turned down to 40% from 50%  16:39  Phoned lab as pt's results for magnesium and CBC not showing. Per lab, magnesium level 2.7. WBC 11.3, H/H 11.4/34.3  18:30  Reported Spontaneous breathing trial ABG to [place], [place]. OK to extubate. 18:35  Pt. Extubated to 4 liters 02 via n/c.    18:38  Updated Dr. Karen Vick on pt condition via phone. 19:10  Bedside and Verbal shift change report given to Fermin Ruiz RN (oncoming nurse) by Jory Chen RN (offgoing nurse). Report included the following information SBAR, Kardex, OR Summary, Procedure Summary, Intake/Output, MAR, Recent Results, Med Rec Status and Cardiac Rhythm AAI paced at 70 bpm   19:15  Updated pt's next-of-kin, Michelet Edgar.

## 2020-09-18 NOTE — INTERVAL H&P NOTE
Update History & Physical 
 
The Patient's History and Physical of September 8, The procedure was reviewed with the patient and I examined the patient. There was no change. The surgical site was confirmed by the patient and me. 
+ BB this AM 
 
Lungs: CTA Heart: RRR 
ABD:  Benign EXT:  Warm and well perfused without edema Plan:  The risk, benefits, expected outcome, and alternative to the recommended procedure have been discussed with the patient. Patient understands and wants to proceed with the procedure.  
 
Electronically signed by Andres Bryant PA-C on 9/18/2020 at 8:01 AM

## 2020-09-19 ENCOUNTER — APPOINTMENT (OUTPATIENT)
Dept: GENERAL RADIOLOGY | Age: 70
DRG: 235 | End: 2020-09-19
Attending: PHYSICIAN ASSISTANT
Payer: MEDICARE

## 2020-09-19 LAB
ADMINISTERED INITIALS, ADMINIT: NORMAL
ANION GAP SERPL CALC-SCNC: 5 MMOL/L (ref 3–18)
ARTERIAL PATENCY WRIST A: ABNORMAL
ATRIAL RATE: 50 BPM
BASE EXCESS BLD CALC-SCNC: 0 MMOL/L
BDY SITE: ABNORMAL
BODY TEMPERATURE: 36.8
BUN SERPL-MCNC: 11 MG/DL (ref 7–18)
BUN/CREAT SERPL: 13 (ref 12–20)
CA-I BLD-MCNC: 1.21 MMOL/L (ref 1.12–1.32)
CALCIUM SERPL-MCNC: 7.8 MG/DL (ref 8.5–10.1)
CALCULATED P AXIS, ECG09: 45 DEGREES
CALCULATED R AXIS, ECG10: -163 DEGREES
CALCULATED T AXIS, ECG11: 53 DEGREES
CHLORIDE SERPL-SCNC: 111 MMOL/L (ref 100–111)
CO2 SERPL-SCNC: 25 MMOL/L (ref 21–32)
CREAT SERPL-MCNC: 0.83 MG/DL (ref 0.6–1.3)
D50 ADMINISTERED, D50ADM: 0 ML
D50 ADMINISTERED, D50ADM: 17 ML
D50 ORDER, D50ORD: 0 ML
D50 ORDER, D50ORD: 17 ML
DIAGNOSIS, 93000: NORMAL
ERYTHROCYTE [DISTWIDTH] IN BLOOD BY AUTOMATED COUNT: 13.5 % (ref 11.6–14.5)
GAS FLOW.O2 O2 DELIVERY SYS: ABNORMAL L/MIN
GAS FLOW.O2 SETTING OXYMISER: 2 L/M
GLUCOSE BLD STRIP.AUTO-MCNC: 100 MG/DL (ref 70–110)
GLUCOSE BLD STRIP.AUTO-MCNC: 100 MG/DL (ref 70–110)
GLUCOSE BLD STRIP.AUTO-MCNC: 101 MG/DL (ref 70–110)
GLUCOSE BLD STRIP.AUTO-MCNC: 119 MG/DL (ref 70–110)
GLUCOSE BLD STRIP.AUTO-MCNC: 46 MG/DL (ref 70–110)
GLUCOSE BLD STRIP.AUTO-MCNC: 57 MG/DL (ref 70–110)
GLUCOSE BLD STRIP.AUTO-MCNC: 85 MG/DL (ref 70–110)
GLUCOSE BLD STRIP.AUTO-MCNC: 86 MG/DL (ref 70–110)
GLUCOSE BLD STRIP.AUTO-MCNC: 88 MG/DL (ref 70–110)
GLUCOSE BLD STRIP.AUTO-MCNC: 88 MG/DL (ref 70–110)
GLUCOSE BLD STRIP.AUTO-MCNC: 92 MG/DL (ref 70–110)
GLUCOSE BLD STRIP.AUTO-MCNC: 92 MG/DL (ref 70–110)
GLUCOSE BLD STRIP.AUTO-MCNC: 93 MG/DL (ref 74–106)
GLUCOSE BLD STRIP.AUTO-MCNC: 96 MG/DL (ref 70–110)
GLUCOSE BLD STRIP.AUTO-MCNC: 96 MG/DL (ref 70–110)
GLUCOSE SERPL-MCNC: 88 MG/DL (ref 74–99)
GLUCOSE, GLC: 100 MG/DL
GLUCOSE, GLC: 100 MG/DL
GLUCOSE, GLC: 101 MG/DL
GLUCOSE, GLC: 119 MG/DL
GLUCOSE, GLC: 57 MG/DL
GLUCOSE, GLC: 85 MG/DL
GLUCOSE, GLC: 88 MG/DL
GLUCOSE, GLC: 92 MG/DL
GLUCOSE, GLC: 92 MG/DL
GLUCOSE, GLC: 93 MG/DL
GLUCOSE, GLC: 96 MG/DL
GLUCOSE, GLC: 96 MG/DL
HCO3 BLD-SCNC: 25 MMOL/L (ref 22–26)
HCT VFR BLD AUTO: 33.1 % (ref 36–48)
HCT VFR BLD CALC: 33 % (ref 36–49)
HGB BLD-MCNC: 11 G/DL (ref 13–16)
HGB BLD-MCNC: 11.2 G/DL (ref 12–16)
HIGH TARGET, HITG: 150 MG/DL
INSULIN ADMINSTERED, INSADM: 0 UNITS/HOUR
INSULIN ADMINSTERED, INSADM: 0.8 UNITS/HOUR
INSULIN ADMINSTERED, INSADM: 0.8 UNITS/HOUR
INSULIN ADMINSTERED, INSADM: 1 UNITS/HOUR
INSULIN ADMINSTERED, INSADM: 1.1 UNITS/HOUR
INSULIN ADMINSTERED, INSADM: 1.1 UNITS/HOUR
INSULIN ADMINSTERED, INSADM: 1.2 UNITS/HOUR
INSULIN ADMINSTERED, INSADM: 1.8 UNITS/HOUR
INSULIN ORDER, INSORD: 0 UNITS/HOUR
INSULIN ORDER, INSORD: 0.8 UNITS/HOUR
INSULIN ORDER, INSORD: 0.8 UNITS/HOUR
INSULIN ORDER, INSORD: 1 UNITS/HOUR
INSULIN ORDER, INSORD: 1.1 UNITS/HOUR
INSULIN ORDER, INSORD: 1.1 UNITS/HOUR
INSULIN ORDER, INSORD: 1.2 UNITS/HOUR
INSULIN ORDER, INSORD: 1.8 UNITS/HOUR
LOW TARGET, LOT: 80 MG/DL
MAGNESIUM SERPL-MCNC: 2.4 MG/DL (ref 1.6–2.6)
MCH RBC QN AUTO: 31.3 PG (ref 24–34)
MCHC RBC AUTO-ENTMCNC: 33.2 G/DL (ref 31–37)
MCV RBC AUTO: 94.3 FL (ref 74–97)
MINUTES UNTIL NEXT BG, NBG: 15 MIN
MINUTES UNTIL NEXT BG, NBG: 60 MIN
MULTIPLIER, MUL: 0.03
ORDER INITIALS, ORDINIT: NORMAL
P-R INTERVAL, ECG05: 282 MS
PCO2 BLD: 43.2 MMHG (ref 35–45)
PH BLD: 7.37 [PH] (ref 7.35–7.45)
PLATELET # BLD AUTO: 158 K/UL (ref 135–420)
PMV BLD AUTO: 10.1 FL (ref 9.2–11.8)
PO2 BLD: 79 MMHG (ref 80–100)
POTASSIUM BLD-SCNC: 4.2 MMOL/L (ref 3.5–5.5)
POTASSIUM SERPL-SCNC: 4.3 MMOL/L (ref 3.5–5.5)
Q-T INTERVAL, ECG07: 486 MS
QRS DURATION, ECG06: 148 MS
QTC CALCULATION (BEZET), ECG08: 443 MS
RBC # BLD AUTO: 3.51 M/UL (ref 4.7–5.5)
SAO2 % BLD: 95 % (ref 92–97)
SERVICE CMNT-IMP: ABNORMAL
SODIUM BLD-SCNC: 141 MMOL/L (ref 136–145)
SODIUM SERPL-SCNC: 141 MMOL/L (ref 136–145)
SPECIMEN TYPE: ABNORMAL
TOTAL RESP. RATE, ITRR: 17
VENTRICULAR RATE, ECG03: 50 BPM
WBC # BLD AUTO: 9 K/UL (ref 4.6–13.2)

## 2020-09-19 PROCEDURE — 97535 SELF CARE MNGMENT TRAINING: CPT

## 2020-09-19 PROCEDURE — 97165 OT EVAL LOW COMPLEX 30 MIN: CPT

## 2020-09-19 PROCEDURE — 65620000000 HC RM CCU GENERAL

## 2020-09-19 PROCEDURE — 93005 ELECTROCARDIOGRAM TRACING: CPT

## 2020-09-19 PROCEDURE — 74011000250 HC RX REV CODE- 250: Performed by: PHYSICIAN ASSISTANT

## 2020-09-19 PROCEDURE — 74011636637 HC RX REV CODE- 636/637: Performed by: PHYSICIAN ASSISTANT

## 2020-09-19 PROCEDURE — 2709999900 HC NON-CHARGEABLE SUPPLY

## 2020-09-19 PROCEDURE — 74011250636 HC RX REV CODE- 250/636: Performed by: PHYSICIAN ASSISTANT

## 2020-09-19 PROCEDURE — 74011250636 HC RX REV CODE- 250/636: Performed by: SPECIALIST

## 2020-09-19 PROCEDURE — 82962 GLUCOSE BLOOD TEST: CPT

## 2020-09-19 PROCEDURE — 85027 COMPLETE CBC AUTOMATED: CPT

## 2020-09-19 PROCEDURE — 97162 PT EVAL MOD COMPLEX 30 MIN: CPT

## 2020-09-19 PROCEDURE — 74011250637 HC RX REV CODE- 250/637: Performed by: PHYSICIAN ASSISTANT

## 2020-09-19 PROCEDURE — 83735 ASSAY OF MAGNESIUM: CPT

## 2020-09-19 PROCEDURE — 80048 BASIC METABOLIC PNL TOTAL CA: CPT

## 2020-09-19 PROCEDURE — 71045 X-RAY EXAM CHEST 1 VIEW: CPT

## 2020-09-19 PROCEDURE — 36600 WITHDRAWAL OF ARTERIAL BLOOD: CPT

## 2020-09-19 PROCEDURE — 82803 BLOOD GASES ANY COMBINATION: CPT

## 2020-09-19 PROCEDURE — 82947 ASSAY GLUCOSE BLOOD QUANT: CPT

## 2020-09-19 PROCEDURE — 97116 GAIT TRAINING THERAPY: CPT

## 2020-09-19 PROCEDURE — P9045 ALBUMIN (HUMAN), 5%, 250 ML: HCPCS | Performed by: PHYSICIAN ASSISTANT

## 2020-09-19 RX ORDER — OXYCODONE AND ACETAMINOPHEN 5; 325 MG/1; MG/1
1-2 TABLET ORAL
Status: DISCONTINUED | OUTPATIENT
Start: 2020-09-19 | End: 2020-09-22 | Stop reason: HOSPADM

## 2020-09-19 RX ORDER — MORPHINE SULFATE 2 MG/ML
2-4 INJECTION, SOLUTION INTRAMUSCULAR; INTRAVENOUS
Status: DISCONTINUED | OUTPATIENT
Start: 2020-09-19 | End: 2020-09-21

## 2020-09-19 RX ORDER — HYDROCODONE BITARTRATE AND ACETAMINOPHEN 5; 325 MG/1; MG/1
1-2 TABLET ORAL
Status: DISCONTINUED | OUTPATIENT
Start: 2020-09-19 | End: 2020-09-19

## 2020-09-19 RX ORDER — ALBUMIN HUMAN 50 G/1000ML
12.5 SOLUTION INTRAVENOUS ONCE
Status: COMPLETED | OUTPATIENT
Start: 2020-09-19 | End: 2020-09-19

## 2020-09-19 RX ORDER — INSULIN GLARGINE 100 [IU]/ML
10 INJECTION, SOLUTION SUBCUTANEOUS DAILY
Status: DISCONTINUED | OUTPATIENT
Start: 2020-09-19 | End: 2020-09-22 | Stop reason: HOSPADM

## 2020-09-19 RX ORDER — DEXTROSE 50 % IN WATER (D50W) INTRAVENOUS SYRINGE
25-50 AS NEEDED
Status: DISCONTINUED | OUTPATIENT
Start: 2020-09-19 | End: 2020-09-22 | Stop reason: HOSPADM

## 2020-09-19 RX ORDER — BUSPIRONE HYDROCHLORIDE 5 MG/1
7.5 TABLET ORAL 2 TIMES DAILY
Status: DISCONTINUED | OUTPATIENT
Start: 2020-09-19 | End: 2020-09-22 | Stop reason: HOSPADM

## 2020-09-19 RX ORDER — SCOLOPAMINE TRANSDERMAL SYSTEM 1 MG/1
1 PATCH, EXTENDED RELEASE TRANSDERMAL
Status: DISCONTINUED | OUTPATIENT
Start: 2020-09-19 | End: 2020-09-21

## 2020-09-19 RX ORDER — INSULIN LISPRO 100 [IU]/ML
INJECTION, SOLUTION INTRAVENOUS; SUBCUTANEOUS
Status: DISCONTINUED | OUTPATIENT
Start: 2020-09-19 | End: 2020-09-22 | Stop reason: HOSPADM

## 2020-09-19 RX ADMIN — DEXTROSE MONOHYDRATE 8.5 G: 25 INJECTION, SOLUTION INTRAVENOUS at 01:03

## 2020-09-19 RX ADMIN — POLYETHYLENE GLYCOL 3350 17 G: 17 POWDER, FOR SOLUTION ORAL at 11:19

## 2020-09-19 RX ADMIN — ONDANSETRON 4 MG: 2 INJECTION INTRAMUSCULAR; INTRAVENOUS at 09:17

## 2020-09-19 RX ADMIN — Medication 10 ML: at 15:25

## 2020-09-19 RX ADMIN — CHLORHEXIDINE GLUCONATE 0.12% ORAL RINSE 10 ML: 1.2 LIQUID ORAL at 10:29

## 2020-09-19 RX ADMIN — HYDROCODONE BITARTRATE AND ACETAMINOPHEN 2 TABLET: 5; 325 TABLET ORAL at 05:28

## 2020-09-19 RX ADMIN — AMIODARONE HYDROCHLORIDE 200 MG: 200 TABLET ORAL at 21:00

## 2020-09-19 RX ADMIN — CEFAZOLIN SODIUM 2 G: 2 SOLUTION INTRAVENOUS at 05:28

## 2020-09-19 RX ADMIN — ATORVASTATIN CALCIUM 80 MG: 40 TABLET, FILM COATED ORAL at 17:38

## 2020-09-19 RX ADMIN — ONDANSETRON 4 MG: 2 INJECTION INTRAMUSCULAR; INTRAVENOUS at 16:33

## 2020-09-19 RX ADMIN — ONDANSETRON 4 MG: 2 INJECTION INTRAMUSCULAR; INTRAVENOUS at 12:54

## 2020-09-19 RX ADMIN — AMIODARONE HYDROCHLORIDE 200 MG: 200 TABLET ORAL at 16:33

## 2020-09-19 RX ADMIN — FAMOTIDINE 20 MG: 20 TABLET, FILM COATED ORAL at 17:38

## 2020-09-19 RX ADMIN — OXYCODONE HYDROCHLORIDE AND ACETAMINOPHEN 2 TABLET: 5; 325 TABLET ORAL at 19:42

## 2020-09-19 RX ADMIN — INSULIN GLARGINE 10 UNITS: 100 INJECTION, SOLUTION SUBCUTANEOUS at 11:18

## 2020-09-19 RX ADMIN — MORPHINE SULFATE 4 MG: 4 INJECTION, SOLUTION INTRAMUSCULAR; INTRAVENOUS at 01:13

## 2020-09-19 RX ADMIN — Medication 10 ML: at 21:00

## 2020-09-19 RX ADMIN — HYDROCODONE BITARTRATE AND ACETAMINOPHEN 1 TABLET: 5; 325 TABLET ORAL at 12:54

## 2020-09-19 RX ADMIN — SODIUM CHLORIDE 10 ML/HR: 900 INJECTION, SOLUTION INTRAVENOUS at 18:12

## 2020-09-19 RX ADMIN — ALBUMIN (HUMAN) 12.5 G: 12.5 INJECTION, SOLUTION INTRAVENOUS at 11:26

## 2020-09-19 RX ADMIN — PHENYLEPHRINE HYDROCHLORIDE 10 MCG/MIN: 10 INJECTION INTRAVENOUS at 18:12

## 2020-09-19 RX ADMIN — MUPIROCIN: 20 OINTMENT TOPICAL at 17:38

## 2020-09-19 RX ADMIN — BUSPIRONE HYDROCHLORIDE 7.5 MG: 5 TABLET ORAL at 10:51

## 2020-09-19 RX ADMIN — CHLORHEXIDINE GLUCONATE 0.12% ORAL RINSE 10 ML: 1.2 LIQUID ORAL at 17:39

## 2020-09-19 RX ADMIN — AMIODARONE HYDROCHLORIDE 200 MG: 200 TABLET ORAL at 11:18

## 2020-09-19 RX ADMIN — HYDROCODONE BITARTRATE AND ACETAMINOPHEN 2 TABLET: 5; 325 TABLET ORAL at 16:33

## 2020-09-19 RX ADMIN — BISACODYL 10 MG: 5 TABLET, COATED ORAL at 11:20

## 2020-09-19 RX ADMIN — BUSPIRONE HYDROCHLORIDE 7.5 MG: 5 TABLET ORAL at 17:38

## 2020-09-19 RX ADMIN — FENTANYL CITRATE 25 MCG: 50 INJECTION, SOLUTION INTRAMUSCULAR; INTRAVENOUS at 02:28

## 2020-09-19 RX ADMIN — DOCUSATE SODIUM 100 MG: 100 CAPSULE, LIQUID FILLED ORAL at 17:38

## 2020-09-19 RX ADMIN — FAMOTIDINE 20 MG: 20 TABLET, FILM COATED ORAL at 10:30

## 2020-09-19 RX ADMIN — Medication 10 ML: at 05:28

## 2020-09-19 RX ADMIN — FENTANYL CITRATE 25 MCG: 50 INJECTION, SOLUTION INTRAMUSCULAR; INTRAVENOUS at 21:00

## 2020-09-19 RX ADMIN — MUPIROCIN: 20 OINTMENT TOPICAL at 10:29

## 2020-09-19 RX ADMIN — DOCUSATE SODIUM 100 MG: 100 CAPSULE, LIQUID FILLED ORAL at 11:19

## 2020-09-19 RX ADMIN — ASPIRIN 81 MG: 81 TABLET ORAL at 10:30

## 2020-09-19 RX ADMIN — ONDANSETRON 4 MG: 2 INJECTION INTRAMUSCULAR; INTRAVENOUS at 21:03

## 2020-09-19 RX ADMIN — ONDANSETRON 4 MG: 2 INJECTION INTRAMUSCULAR; INTRAVENOUS at 05:40

## 2020-09-19 RX ADMIN — MORPHINE SULFATE 4 MG: 4 INJECTION, SOLUTION INTRAMUSCULAR; INTRAVENOUS at 10:28

## 2020-09-19 RX ADMIN — MORPHINE SULFATE 4 MG: 4 INJECTION, SOLUTION INTRAMUSCULAR; INTRAVENOUS at 07:03

## 2020-09-19 NOTE — CONSULTS
[facility] - Consult Note    Consultation request by Latosha Norris MD for advice/opinion related to evaluating CAD (coronary artery disease) [I25.10]    Date of  Admission: 9/18/2020  6:43 AM   Primary Care Physician:  Humera Linares MD     Assessment:     -CAD status post CABG x4, LIMA to mid LAD, SVG to OM, SVG to RPL V and SVG to diagonal  -Hypertension  -Diabetes  -Remote history of tobacco abuse disorder  -Alcohol use: He states he is drinking about 2 to 3 cans of beer almost every day before recent unstable angina 2 weeks ago     Plan:     Patient tolerated surgical procedure well yesterday without any complication. Currently on low-dose of pressor  CT surgery managing postoperative course  Still being paced at 90 bpm.  Continue with aspirin, atorvastatin, amiodarone  Supportive care per CT surgery team     History of Present Illness: This is a 72 y.o. male admitted for CAD (coronary artery disease) [I25.10]. Patient complains of:      70-year-old male with history of CAD who underwent elective outpatient CABG x4 yesterday. Patient was admitted to the hospital 2 weeks ago and had a cardiac catheterization which showed severe three-vessel coronary disease. CABG was performed. This morning patient has some surgical site pain however denies any symptoms concerning for angina    Cardiac risk factors: dyslipidemia      Review of Symptoms:  Except as stated above include:  Constitutional:  negative  Respiratory:  negative  Cardiovascular:  negative  Gastrointestinal: negative  Genitourinary:  negative  Musculoskeletal:  Negative  Neurological:  Negative  Dermatological:  Negative  Endocrinological: Negative  Psychological:  Negative    A comprehensive review of systems was negative except for that written in the HPI.      Past Medical History:     Past Medical History:   Diagnosis Date    Allergic rhinitis     Asthma     Diabetes type 2, uncontrolled (HCC)     Last HbA1c 7.6% per Patient in late 2020    Diabetic retinal damage of both eyes ([facility] [address]     Enlarged prostate     ETOH abuse     2-3 Beers/day x>30 years    Former smoker     2/3 PPD x4-5 years; Quit ~    Generalized anxiety disorder with panic attacks     History of dermatomyositis     Patient doubts this diagnosis    HLD (hyperlipidemia)     Hypertension     NSTEMI (non-ST elevated myocardial infarction) ([facility] [address] 2020    Statin intolerance     \"Muscular & Neurological Damage\" due to Statins         Social History:     Social History     Socioeconomic History    Marital status:      Spouse name: Not on file    Number of children: Not on file    Years of education: Not on file    Highest education level: Not on file   Tobacco Use    Smoking status: Former Smoker     Packs/day: 0.67     Years: 4.50     Pack years: 3.01     Last attempt to quit:      Years since quittin.7    Smokeless tobacco: Never Used    Tobacco comment: 2/3 PPD x4-5 years; Quit    Substance and Sexual Activity    Alcohol use:  Yes     Alcohol/week: 21.0 - 25.0 standard drinks     Types: 21 - 25 Cans of beer per week     Frequency: 4 or more times a week     Drinks per session: 3 or 4     Binge frequency: Daily or almost daily     Comment: 2-3 Beers/day x30 years    Drug use: Never   Other Topics Concern        Family History:     Family History   Problem Relation Age of Onset    Heart Failure Father     Coronary Artery Disease Father 76    Heart Failure Other     Stroke Mother     Coronary Artery Disease Mother 61        Medications:   No Known Allergies     Current Facility-Administered Medications   Medication Dose Route Frequency    insulin glargine (LANTUS) injection 10 Units  10 Units SubCUTAneous DAILY    busPIRone (BUSPAR) tablet 7.5 mg  7.5 mg Oral BID    beer (BUD ICE) 12 oz  12 oz Oral TID PRN    0.9% sodium chloride infusion  10 mL/hr IntraVENous CONTINUOUS    sodium chloride (NS) flush 5-40 mL  5-40 mL IntraVENous Q8H    sodium chloride (NS) flush 5-40 mL  5-40 mL IntraVENous PRN    acetaminophen (TYLENOL) tablet 650 mg  650 mg Oral Q4H PRN    HYDROcodone-acetaminophen (NORCO) 5-325 mg per tablet 1-2 Tab  1-2 Tab Oral Q6H PRN    morphine injection 2-4 mg  2-4 mg IntraVENous Q2H PRN    naloxone (NARCAN) injection 0.4 mg  0.4 mg IntraVENous PRN    mupirocin (BACTROBAN) 2 % ointment   Both Nostrils BID    amiodarone (CORDARONE) tablet 200 mg  200 mg Oral TID    albuterol (PROVENTIL VENTOLIN) nebulizer solution 2.5 mg  2.5 mg Nebulization Q4H PRN    metoprolol tartrate (LOPRESSOR) tablet 12.5 mg  12.5 mg Oral Q12H    ondansetron (ZOFRAN) injection 4 mg  4 mg IntraVENous Q4H PRN    PHENYLephrine (LANDON-SYNEPHRINE) 30 mg in 0.9% sodium chloride 250 mL infusion  0-100 mcg/min IntraVENous TITRATE    aspirin delayed-release tablet 81 mg  81 mg Oral DAILY    chlorhexidine (PERIDEX) 0.12 % mouthwash 10 mL  10 mL Oral BID    docusate sodium (COLACE) capsule 100 mg  100 mg Oral BID    ELECTROLYTE REPLACEMENT PROTOCOL - Potassium Standard Dosing  1 Each Other PRN    ELECTROLYTE REPLACEMENT PROTOCOL - Magnesium  1 Each Other PRN    [START ON 9/20/2020] enoxaparin (LOVENOX) injection 40 mg  40 mg SubCUTAneous Q24H    atorvastatin (LIPITOR) tablet 80 mg  80 mg Oral QPM    bisacodyL (DULCOLAX) tablet 10 mg  10 mg Oral DAILY    famotidine (PEPCID) tablet 20 mg  20 mg Oral BID    fentaNYL citrate (PF) injection 12.5-25 mcg  12.5-25 mcg IntraVENous Q15MIN PRN    polyethylene glycol (MIRALAX) packet 17 g  17 g Oral DAILY    insulin regular (NOVOLIN R, HUMULIN R) 100 Units in 0.9% sodium chloride 100 mL infusion  0-50 Units/hr IntraVENous TITRATE    glucose chewable tablet 16 g  4 Tab Oral PRN    glucagon (GLUCAGEN) injection 1 mg  1 mg IntraMUSCular PRN    dextrose (D50W) injection syrg 12.5-25 g  25-50 mL IntraVENous PRN    ELECTROLYTE REPLACEMENT PROTOCOL - Calcium   1 Each Other PRN         Physical Exam: Visit Vitals  /74   Pulse 89   Temp 98.4 °F (36.9 °C)   Resp 13   Ht 5' 7\" (1.702 m)   Wt 174 lb 12.8 oz (79.3 kg)   SpO2 98%   BMI 27.38 kg/m²     BP Readings from Last 3 Encounters:   09/19/20 109/74   09/14/20 127/72   09/04/20 90/60     Pulse Readings from Last 3 Encounters:   09/19/20 89   09/14/20 70   09/04/20 69     Wt Readings from Last 3 Encounters:   09/19/20 174 lb 12.8 oz (79.3 kg)   09/03/20 163 lb (73.9 kg)       General:  alert, cooperative, no distress, appears stated age  Neck:  no carotid bruit, no JVD  Lungs: Decreased breath sound at the base  Heart:  regular rate and rhythm, S1, S2 normal, no murmur, click, rub or gallop  Abdomen:  abdomen is soft without significant tenderness,   Extremities: No significant edema  Skin: Warm and dry.  no hyperpigmentation, vitiligo, or suspicious lesions  Neuro: alert, oriented x3, affect appropriate,   Psych: non focal  Chest tube present     Data Review:     Recent Labs     09/19/20  0530 09/18/20  1405   WBC 9.0 11.3   HGB 11.0* 11.4*   HCT 33.1* 34.3*    146     Recent Labs     09/19/20  0530 09/18/20  1405    143   K 4.3 3.6    109   CO2 25 27   GLU 88 185*   BUN 11 14   CREA 0.83 0.91   CA 7.8* 7.1*   MG 2.4 2.7*   INR  --  1.2       Results for orders placed or performed during the hospital encounter of 09/18/20   EKG, 12 LEAD, INITIAL   Result Value Ref Range    Ventricular Rate 50 BPM    Atrial Rate 50 BPM    P-R Interval 282 ms    QRS Duration 148 ms    Q-T Interval 486 ms    QTC Calculation (Bezet) 443 ms    Calculated P Axis 45 degrees    Calculated R Axis -163 degrees    Calculated T Axis 53 degrees    Diagnosis       Sinus bradycardia with 1st degree AV block  Indeterminate axis  Right bundle branch block  Abnormal ECG  When compared with ECG of 03-SEP-2020 11:41,  Questionable change in QRS axis         All Cardiac Markers in the last 24 hours:  No results found for: CPK, CK, CKMMB, CKMB, RCK3, CKMBT, CKNDX, CKND1, DEBBIE, TROPT, TROIQ, ALDA, TROPT, TNIPOC, BNP, BNPP    Last Lipid:    Lab Results   Component Value Date/Time    Cholesterol, total 192 09/03/2020 03:50 AM    HDL Cholesterol 56 09/03/2020 03:50 AM    LDL, calculated 86.2 09/03/2020 03:50 AM    Triglyceride 249 (H) 09/03/2020 03:50 AM    CHOL/HDL Ratio 3.4 09/03/2020 03:50 AM       Signed By: Jovanny Garcia MD     September 19, 2020

## 2020-09-19 NOTE — PROGRESS NOTES
Problem: Mobility Impaired (Adult and Pediatric)  Goal: *Acute Goals and Plan of Care (Insert Text)  Description: Physical Therapy Goals  Initiated 9/19/2020 and to be accomplished within 7 day(s)  1. Patient will move from supine to sit and sit to supine  in bed with minimal assistance/contact guard assist.    2.  Patient will transfer from bed to chair and chair to bed with minimal assistance/contact guard assist using the least restrictive device. 3.  Patient will perform sit to stand with supervision/set-up. 4.  Patient will ambulate with supervision/set-up for 250 feet with the least restrictive device. 5.  Patient will ascend/descend 5 stairs with one handrail(s) with minimal assistance/contact guard assist to prepare for steps at home. 6.  Patient will be independent with adhering to sternal precautions with functional mobility. Prior Level of Function:   Patient was independent for all mobility including gait without DME. Patient lives with fiance in a 2nd floor walk-up apartment with 2 full flights of steps. Works as a . Outcome: Progressing Towards Goal   PHYSICAL THERAPY EVALUATION    Patient: Zaid Miller (52 y.o. male)  Date: 9/19/2020  Primary Diagnosis: CAD (coronary artery disease) [I25.10]  Procedure(s) (LRB):  CORONARY ARTERY BYPASS GRAFT (CABG) TIMES FOUR (N/A) 1 Day Post-Op   Precautions:   Fall, Sternal  WBAT  PLOF: independent    ASSESSMENT :  Patient is 72year old male s/p CABG x 4 POD#1. Based on the objective data described below, the patient presents with unsteadiness on feet, sternal pain, impaired transfers, decreased activity tolerance, and unsteady gait. Nurse cleared patient for participation in skilled physical therapy. Patient received sitting up in bedside chair, awake and alert, agreeable to physical therapy treatment. Patient on room air, +chest tube x 2, +vegas catheter, +IV, +arterial line, +external pacer.  Vitals at rest: HR 59 bpm, SpO2 94%, BP 102/62 mmHg. Patient educated on all sternal precautions and was able to recall \"no pushing. \" Patient verbalized understanding of of sternal precautions. OT joined evaluation while preparing for ambulation in hallway. Patient educated on heart hugger device, using heart hugger when performing sit<>stand transfers. Completed scooting to edge of chair with CGA with good weight shifting while adhering to sternal precautions. CGA for sit<>stand tranfers with cues for utilizing momentum and anterior weight shift. Patient ambulated with PT guidance x 75 feet CGA with rollator on 2 LPM. Pulse oximeter with poor tracking. Three rest breaks needed in standing with cues for breathing technique. Increased to 3 LPM O2 due to poor tracking on pulse oximeter with readings 80-85% then up to 90% but poor signal integrity. HR up to 64 bpm with ambulation. Verbal cues for upright posture, safety with turns, and not bearing through UE with rollator. Gait exhibited slow kimber but continuous steps, decreased step length bilaterally. Patient returned to sitting in bedside chair with CGA x 2 and cues for hugging heart hugger. Patient left sitting up in chair, all leads connected, stool under feet, in no apparent distress, call bell in reach, vitals stable. HR 61 bpm, SpO2 with poor tracking but remained on 2 LPM O2, /59 mmHg after session. Nurse notified and attending to patient. Patient will benefit from skilled intervention to address the above impairments.   Patient's rehabilitation potential is considered to be Good  Factors which may influence rehabilitation potential include:   []         None noted  []         Mental ability/status  [x]         Medical condition  [x]         Home/family situation and support systems  []         Safety awareness  []         Pain tolerance/management  []         Other:      PLAN :  Recommendations and Planned Interventions:   [x]           Bed Mobility Training             [x] Neuromuscular Re-Education  [x]           Transfer Training                   []    Orthotic/Prosthetic Training  [x]           Gait Training                          []    Modalities  [x]           Therapeutic Exercises           []    Edema Management/Control  [x]           Therapeutic Activities            [x]    Family Training/Education  [x]           Patient Education  []           Other (comment):    Frequency/Duration: Patient will be followed by physical therapy 1-2 times per day/4-7 days per week to address goals. Discharge Recommendations: Rehab vs home with home health and 24-hr supervision from [facility], pending patient progress. Patient has over two flights of steps and sleeps on mattress on the floor. Further Equipment Recommendations for Discharge: shower chair; will try to wean from walker as patient was independent PTA     SUBJECTIVE:   Patient stated My fiance pulls me up from the mattress on the floor. Can she still do that?     OBJECTIVE DATA SUMMARY:     Past Medical History:   Diagnosis Date    Allergic rhinitis     Asthma     Diabetes type 2, uncontrolled ([address]     Last HbA1c 7.6% per Patient in late 8/2020    Diabetic retinal damage of both eyes ([address]     Enlarged prostate     ETOH abuse     2-3 Beers/day x>30 years    Former smoker     2/3 PPD x4-5 years; Quit ~2002    Generalized anxiety disorder with panic attacks     History of dermatomyositis     Patient doubts this diagnosis    HLD (hyperlipidemia)     Hypertension     NSTEMI (non-ST elevated myocardial infarction) ([address] 09/02/2020    Statin intolerance     \"Muscular & Neurological Damage\" due to Statins     Past Surgical History:   Procedure Laterality Date    HX TONSIL AND ADENOIDECTOMY       Barriers to Learning/Limitations: None  Compensate with: N/A  Home Situation:  Home Situation  Home Environment: Apartment  # Steps to Enter: 30(2nd floor apartment with two flights of steps)  One/Two Story Residence: [facility] story  Living Alone: No  Support Systems: Spouse/Significant Other/Partner  Patient Expects to be Discharged to[de-identified] Apartment  Current DME Used/Available at Home: None  Tub or Shower Type: Tub/Shower combination  Critical Behavior:  Neurologic State: Alert  Orientation Level: Oriented X4  Cognition: Follows commands  Safety/Judgement: Fall prevention  Psychosocial  Patient Behaviors: Calm; Cooperative  Family  Behaviors: Appropriate for situation; Supportive  Needs Expressed: Educational  Purposeful Interaction: Yes  Pt Identified Daily Priority: Clinical issues (comment)  Caritas Process: Nurture loving kindness;Enable juanita/hope;Establish trust;Nurture spiritual self;Teaching/learning; Attend basic human needs;Create healing environment;Supportive expression  Caring Interventions: Reassure; Therapeutic modalities  Reassure: Therapeutic listening; Informing; Acceptance; Instilling juanita and hope;Caring rounds  Therapeutic Modalities: Humor; Intentional therapeutic touch  Other Caring Modalities: hourly rounds(hourly rounding)  Skin Condition/Temp: Warm  Family  Behaviors: Appropriate for situation; Supportive  Skin Integrity: Incision (comment)(sternum, RLE, chest tubes x 3)  Skin Integumentary  Skin Color: Appropriate for ethnicity  Skin Condition/Temp: Warm  Skin Integrity: Incision (comment)(sternum, RLE, chest tubes x 3)  Turgor: Non-tenting     Strength:    Strength: Generally decreased, functional(tested within sternal precautions)         Tone & Sensation:   Tone: Normal      Sensation: Intact          Range Of Motion:  AROM: (RUE decreased, non functional. LUE decreased, functional)           Functional Mobility:  Bed Mobility:  Scooting: Contact guard assistance(in chair)  Transfers:  Sit to Stand: Contact guard assistance;Assist x2  Stand to Sit: Contact guard assistance;Assist x2     Balance:   Sitting: Intact  Standing: Impaired; With support  Standing - Static: Good  Standing - Dynamic : Fair    Ambulation/Gait Training:  Distance (ft): 75 Feet (ft)  Assistive Device: Walker, rollator  Ambulation - Level of Assistance: Contact guard assistance;Assist x2     Gait Description (WDL): Exceptions to WDL  Gait Abnormalities: Decreased step clearance  Base of Support: Narrowed     Speed/Maryanne: Pace decreased (<100 feet/min); Slow  Step Length: Right shortened;Left shortened    Pain:  Pain level pre-treatment: 6/10   Pain level post-treatment: 6/10   Pain Intervention(s) : Medication (see MAR); Rest, Repositioning  Response to intervention: Nurse notified, See doc flow    Activity Tolerance:   good  Please refer to the flowsheet for vital signs taken during this treatment. After treatment:   [x]         Patient left in no apparent distress sitting up in chair  []         Patient left in no apparent distress in bed  [x]         Call bell left within reach  [x]         Nursing notified  []         Caregiver present  []         Bed alarm activated  []         SCDs applied    COMMUNICATION/EDUCATION:   [x]         Role of Physical Therapy in the acute care setting. [x]         Fall prevention education was provided and the patient/caregiver indicated understanding. [x]         Patient/family have participated as able in goal setting and plan of care. [x]         Patient/family agree to work toward stated goals and plan of care. []         Patient understands intent and goals of therapy, but is neutral about his/her participation. []         Patient is unable to participate in goal setting/plan of care: ongoing with therapy staff.  []         Other:     Thank you for this referral.  Gideon Vidal , PT, DPT   Time Calculation: 48 mins      Eval Complexity: History: MEDIUM  Complexity : 1-2 comorbidities / personal factors will impact the outcome/ POC Exam:MEDIUM Complexity : 3 Standardized tests and measures addressing body structure, function, activity limitation and / or participation in recreation  Presentation: MEDIUM Complexity : Evolving with changing characteristics  Clinical Decision Making:Medium Complexity strength, transfers, gait, activity tolerance Overall Complexity:MEDIUM

## 2020-09-19 NOTE — PROGRESS NOTES
1900 - shift report from [facility] given to STEVEN CALL. Discussed PMH, Admit Dx, events since admission, tx plan, IV access, IVF, MAR, labs, physical assessment    1930 - pt c/o uncontrolled pain. Medication given. Morphine given. BP dropped with morphine Anjel restarted. See MAR.    2000 - pt c/o uncontrolled pain. Medication not effective. Tylenol and Fentanyl given. See MAR. Physical assessment see flowsheet. 2030 - pt resting comfortably after medication    0000 - pt resting comfortably. Assessment unchanged    0059 - glucose 57. 17mls D50 given per protocol. Insulin gtt held per protocol. See MAR    0119 - glucose recheck 100. Insulin gtt restarted per protocol. See MAR     0400 - pt resting comfortably. Assessment unchanged    0600 - SWAN removed. Pt OOB to recliner 1 Assist    0715 - shift report given to [facility], RN.  Discussed PMH, Admit Dx, events since admission, tx plan, IV access, IVF, MAR, labs, physical assessment

## 2020-09-19 NOTE — PROGRESS NOTES
Problem: Diabetes Self-Management  Goal: *Disease process and treatment process  Description: Define diabetes and identify own type of diabetes; list 3 options for treating diabetes. Outcome: Progressing Towards Goal  Goal: *Incorporating nutritional management into lifestyle  Description: Describe effect of type, amount and timing of food on blood glucose; list 3 methods for planning meals. Outcome: Progressing Towards Goal  Goal: *Incorporating physical activity into lifestyle  Description: State effect of exercise on blood glucose levels. Outcome: Progressing Towards Goal  Goal: *Developing strategies to promote health/change behavior  Description: Define the ABC's of diabetes; identify appropriate screenings, schedule and personal plan for screenings. Outcome: Progressing Towards Goal  Goal: *Using medications safely  Description: State effect of diabetes medications on diabetes; name diabetes medication taking, action and side effects. Outcome: Progressing Towards Goal  Goal: *Monitoring blood glucose, interpreting and using results  Description: Identify recommended blood glucose targets  and personal targets. Outcome: Progressing Towards Goal  Goal: *Prevention, detection, treatment of acute complications  Description: List symptoms of hyper- and hypoglycemia; describe how to treat low blood sugar and actions for lowering  high blood glucose level. Outcome: Progressing Towards Goal  Goal: *Prevention, detection and treatment of chronic complications  Description: Define the natural course of diabetes and describe the relationship of blood glucose levels to long term complications of diabetes.   Outcome: Progressing Towards Goal  Goal: *Developing strategies to address psychosocial issues  Description: Describe feelings about living with diabetes; identify support needed and support network  Outcome: Progressing Towards Goal  Goal: *Insulin pump training  Outcome: Progressing Towards Goal  Goal: *Sick day guidelines  Outcome: Progressing Towards Goal  Goal: *Patient Specific Goal (EDIT GOAL, INSERT TEXT)  Outcome: Progressing Towards Goal     Problem: Patient Education: Go to Patient Education Activity  Goal: Patient/Family Education  Outcome: Progressing Towards Goal     Problem: Ventilator Management  Goal: *Adequate oxygenation and ventilation  Outcome: Progressing Towards Goal  Goal: *Patient maintains clear airway/free of aspiration  Outcome: Progressing Towards Goal  Goal: *Absence of infection signs and symptoms  Outcome: Progressing Towards Goal  Goal: *Normal spontaneous ventilation  Outcome: Progressing Towards Goal     Problem: Patient Education: Go to Patient Education Activity  Goal: Patient/Family Education  Outcome: Progressing Towards Goal     Problem: Pressure Injury - Risk of  Goal: *Prevention of pressure injury  Description: Document Jadiel Scale and appropriate interventions in the flowsheet. Outcome: Progressing Towards Goal  Note: Pressure Injury Interventions:  Sensory Interventions: Assess changes in LOC         Activity Interventions: Assess need for specialty bed, Chair cushion, Increase time out of bed, Pressure redistribution bed/mattress(bed type), PT/OT evaluation    Mobility Interventions: Assess need for specialty bed, Chair cushion, Pressure redistribution bed/mattress (bed type), PT/OT evaluation, Turn and reposition approx. every two hours(pillow and wedges)    Nutrition Interventions: Document food/fluid/supplement intake    Friction and Shear Interventions: Apply protective barrier, creams and emollients, Foam dressings/transparent film/skin sealants, Minimize layers                Problem: Patient Education: Go to Patient Education Activity  Goal: Patient/Family Education  Outcome: Progressing Towards Goal     Problem: Falls - Risk of  Goal: *Absence of Falls  Description: Document Raimundo Fall Risk and appropriate interventions in the flowsheet.   Outcome: Progressing Towards Goal     Problem: Patient Education: Go to Patient Education Activity  Goal: Patient/Family Education  Outcome: Progressing Towards Goal     Problem: Patient Education: Go to Patient Education Activity  Goal: Patient/Family Education  Outcome: Progressing Towards Goal     Problem: Patient Education: Go to Patient Education Activity  Goal: Patient/Family Education  Outcome: Progressing Towards Goal
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
## 2020-09-19 NOTE — PROGRESS NOTES
1900 - shift report from Est[facility] given to STEVEN CALL. Discussed PMH, Admit Dx, events since admission, tx plan, IV access, IVF, MAR, labs, physical assessment. Pt c/o ongoing nausea and pain. Resting in chair at this time. [id number] FORD Valdovinos called regarding ongoing nausea and pain. New orders entered. NSS increased to 75ml/hr for renal support    2100 - Anjel off    2130 - observed pacer failure to capture. Atrial output increased to 12 milliamps. Rate maintained at 90bpm. Hypotensive. Anjel restarted     2200 - Anjel off    2215 - pt c/o feeling spasm in chest. Likely r/t pacer wires. Decreased to 10 milliamps. Spasms resolved. Occasional failure to capture. BP maintained. Will continue to monitor    0000 - pt resting comfortably at this time. Assessment unchanged    0400 - Donna removed. Pt OOB to toilet. No BM at this time. Incision dressings removed. Incisions cleaned. New dressings applied. 0430 - pacer not capturing. Increased milliamps to 12. Pt resting comfortably in recliner    0600 - urine output slowed to 30mls/hr for two hours. See flowchart. 0700 - shift report given to STEVEN Soria.  Discussed PMH, Admit Dx, events since admission, tx plan, IV access, IVF, MAR, labs, physical assessment

## 2020-09-19 NOTE — PROGRESS NOTES
Problem: Diabetes Self-Management  Goal: *Disease process and treatment process  Description: Define diabetes and identify own type of diabetes; list 3 options for treating diabetes. Outcome: Progressing Towards Goal  Goal: *Incorporating nutritional management into lifestyle  Description: Describe effect of type, amount and timing of food on blood glucose; list 3 methods for planning meals. Outcome: Progressing Towards Goal  Goal: *Incorporating physical activity into lifestyle  Description: State effect of exercise on blood glucose levels. Outcome: Progressing Towards Goal  Goal: *Developing strategies to promote health/change behavior  Description: Define the ABC's of diabetes; identify appropriate screenings, schedule and personal plan for screenings. Outcome: Progressing Towards Goal  Goal: *Using medications safely  Description: State effect of diabetes medications on diabetes; name diabetes medication taking, action and side effects. Outcome: Progressing Towards Goal  Goal: *Monitoring blood glucose, interpreting and using results  Description: Identify recommended blood glucose targets  and personal targets. Outcome: Progressing Towards Goal  Goal: *Prevention, detection, treatment of acute complications  Description: List symptoms of hyper- and hypoglycemia; describe how to treat low blood sugar and actions for lowering  high blood glucose level. Outcome: Progressing Towards Goal  Goal: *Prevention, detection and treatment of chronic complications  Description: Define the natural course of diabetes and describe the relationship of blood glucose levels to long term complications of diabetes.   Outcome: Progressing Towards Goal  Goal: *Developing strategies to address psychosocial issues  Description: Describe feelings about living with diabetes; identify support needed and support network  Outcome: Progressing Towards Goal  Goal: *Insulin pump training  Outcome: Progressing Towards Goal  Goal: *Sick day guidelines  Outcome: Progressing Towards Goal  Goal: *Patient Specific Goal (EDIT GOAL, INSERT TEXT)  Outcome: Progressing Towards Goal     Problem: Patient Education: Go to Patient Education Activity  Goal: Patient/Family Education  Outcome: Progressing Towards Goal     Problem: Ventilator Management  Goal: *Adequate oxygenation and ventilation  Outcome: Progressing Towards Goal  Goal: *Patient maintains clear airway/free of aspiration  Outcome: Progressing Towards Goal  Goal: *Absence of infection signs and symptoms  Outcome: Progressing Towards Goal  Goal: *Normal spontaneous ventilation  Outcome: Progressing Towards Goal     Problem: Patient Education: Go to Patient Education Activity  Goal: Patient/Family Education  Outcome: Progressing Towards Goal     Problem: Pressure Injury - Risk of  Goal: *Prevention of pressure injury  Description: Document Jadiel Scale and appropriate interventions in the flowsheet. Outcome: Progressing Towards Goal  Note: Pressure Injury Interventions:  Sensory Interventions: Assess need for specialty bed         Activity Interventions: Assess need for specialty bed    Mobility Interventions: Assess need for specialty bed    Nutrition Interventions: Document food/fluid/supplement intake    Friction and Shear Interventions: Apply protective barrier, creams and emollients                Problem: Patient Education: Go to Patient Education Activity  Goal: Patient/Family Education  Outcome: Progressing Towards Goal     Problem: Falls - Risk of  Goal: *Absence of Falls  Description: Document Sherral Shook Fall Risk and appropriate interventions in the flowsheet.   Outcome: Progressing Towards Goal     Problem: Patient Education: Go to Patient Education Activity  Goal: Patient/Family Education  Outcome: Progressing Towards Goal

## 2020-09-19 NOTE — PROGRESS NOTES
CARDIAC SURGERY PROGRESS NOTE    2020  10:14 AM     Post Operative Day # 1     Chart reviewed. Will discuss with Dr. Nishant Luna. Interval History/Events of Past 24 hours:   Walks in [place] with 1 l/m supplemental oxygen. Low dose betty. Urine output drifting down to 20 ml/hour. Chest tubes still draining. IS to 750 cc. Subjective:  Patient seen and examined on rounds today. Sternal incisional pain  Pain level: 8/10 to 3 with MS. Ambulating: fairly well     Objective:  Vital signs:   Visit Vitals  /65   Pulse 63   Temp 97.9 °F (36.6 °C)   Resp 14   Ht 5' 7\" (1.702 m)   Wt 79.3 kg (174 lb 12.8 oz)   SpO2 99%   BMI 27.38 kg/m²     Temp (24hrs), Av.4 °F (36.3 °C), Min:95.7 °F (35.4 °C), Max:98.8 °F (37.1 °C)    Admission Weight: Last Weight   Weight: 75.3 kg (166 lb) Weight: 79.3 kg (174 lb 12.8 oz)     Telemetry: AAI 90- underlying: SB 58    Physical Examination:     General:  Alert, oriented   Lungs: Clear to ascultation without rales, wheezes or rhonchi. Chest: Dressings clean and dry. Heart: regular rate and rhythm, No murmur. +rub  Abdomen: Soft and non-tender without masses. Bowel sounds present. Extremities: Warm and well perfused. Edema mild. Incisions: right ACE clean and dry. Neuro: No deficit. Beta-blocker: Yes  ASA: Yes   Statin: Yes  ACE-I: No  Diuretic: No     DVT Prophylaxis:   pneumatic compression boots: Yes  Compression stockings:  Yes  Enoxaparin:  Yes    Chest tubes:  present.   Air Leak:  No    Pacing wires:  present    DME needs:  TBD          Labs:  Lab Results   Component Value Date/Time    WBC 9.0 2020 05:30 AM    HCT 33.1 (L) 2020 05:30 AM     2020 05:30 AM      Lab Results   Component Value Date/Time     2020 05:30 AM    K 4.3 2020 05:30 AM     2020 05:30 AM    CO2 25 2020 05:30 AM    GLU 88 2020 05:30 AM    BUN 11 2020 05:30 AM    CREA 0.83 2020 05:30 AM    CREA 0.91 2020 02:05 PM CREA 0.90 09/03/2020 03:50 AM           EKG: SR 60, RBBB     CXR: no ptx. Small left effusion    Assessment:  S/P  CABG x 4  Respiratory parameters stable  Cardiac status stable     Plans:  1. Hold BB today due to bradycardia. Use betty to maintain MAP >65. OK for amio. 2.  Albumin 5 % now. May need lasix later. Leave Wilkins until UO > 35 ml/hour x 2. Remove A-Line. Leave chest tubes. 3. Lantus and transition to [facility]  4. Pain management. Buspar adjusted to dose he is actually taking at home (he breaks his 15 mg tab). Offered prn beer due to daily drinking. 5.  D/W Dr. Guillermo Brown use encouraged to mitigate pain and will also assist with deep breathing and incentive spirometry goals. Possible discharge 3 days.     Abdirahman Davis PA-C

## 2020-09-19 NOTE — PROGRESS NOTES
07:10  Received pt in recliner with ART line in left radial.  Neosynephrine infusing at 20 mcg/min &  Insulin drip. 3 chest tubes to 2 atriums on constant wall suction - 20 cm. No air leak detected. Wilkins cathetar draining clear indra urine. Pt. Alert and oriented x4. Pt. In sinus bradycardia 58 bpm with 1st degree AVB, BBB. A/V pacing wires attached to external pacer wires which are rolled-up and in front pocket. 08:10  Pt. C/o nausea. Will repeat zofran when able. 08:30  Changed gaping central line dressing to cordis. Inserted 2nd IV (20 g left forarm). Attached pacing wires to external pacing box with back-up rate 50 bpm AAI.    10:20  Pt. Ambulated in unit with PT/OT with strong gait and balance. Able to raise from sitting to standing with strong legs. Needs coaching with sternal precautions and use of heart hugger. Ambulated approximately 100 feet with 2 liters 02 via n/c. Returned to ROLLY Ocon[place]. Mediastinal and left pleural atrium drained 100 ml serosanguinous fluid during ambulation. Urinary output low with hourly output 20ml, 20 ml, 35 ml  10:30  Spoke with Nikunj Bernard via phone. Updated on pt's condition: low urinary output 20-35 ml/hr,  Neosynephrine drip. Will pace pt AAI at 90 bpm to attempt to wean neosynephrine. 10:45  FORD Bernard at bedside. Aware of chest tube drainage after ambulation. Per Agnieszka Bay, will hold lopressor but will give amiodarone. 10:55  Dr. Rip Mendez, cardiologist at bedside. Anjel weaned off  11:20  Lantus 10 units administered sc.  11:45  Dr. Nanette Roberts at bedside. Updated on pt's condition  14:10  Restarted neosynephrine at 10 mcg/min for MAP less than 65 mmHg  15:15  Insulin drip discontinued. Changed to sliding scale ACHS  16:15  Spoke with Nikunj Bernard re: pain control. Morphine no longer available on MAR. Hydrocodone available q 6 hrs but pt requiring pain relief more frequently.   Order received for hydrocodone 1-2 tabs q 4 hrs prn.  17:40  Have attempted to walk pt twice today but pt has been nauseated and has declined. Will attempt to walk before shift end. 19:00  Pt. Refused ambulation r/t nausea. Bedside and Verbal shift change report given to Garfield Yung RN (oncoming nurse) by Johnson Chan RN (offgoing nurse).  Report included the following information SBAR, Kardex, Procedure Summary, Intake/Output, MAR, Recent Results and Cardiac Rhythm Paced AAI at 90 bpm.

## 2020-09-20 ENCOUNTER — APPOINTMENT (OUTPATIENT)
Dept: GENERAL RADIOLOGY | Age: 70
DRG: 235 | End: 2020-09-20
Attending: PHYSICIAN ASSISTANT
Payer: MEDICARE

## 2020-09-20 LAB
ANION GAP SERPL CALC-SCNC: 3 MMOL/L (ref 3–18)
ATRIAL RATE: 60 BPM
BUN SERPL-MCNC: 11 MG/DL (ref 7–18)
BUN/CREAT SERPL: 16 (ref 12–20)
CALCIUM SERPL-MCNC: 7.5 MG/DL (ref 8.5–10.1)
CALCULATED P AXIS, ECG09: 27 DEGREES
CALCULATED R AXIS, ECG10: 17 DEGREES
CALCULATED T AXIS, ECG11: 32 DEGREES
CHLORIDE SERPL-SCNC: 110 MMOL/L (ref 100–111)
CO2 SERPL-SCNC: 27 MMOL/L (ref 21–32)
CREAT SERPL-MCNC: 0.7 MG/DL (ref 0.6–1.3)
DIAGNOSIS, 93000: NORMAL
GLUCOSE BLD STRIP.AUTO-MCNC: 136 MG/DL (ref 70–110)
GLUCOSE BLD STRIP.AUTO-MCNC: 146 MG/DL (ref 70–110)
GLUCOSE BLD STRIP.AUTO-MCNC: 171 MG/DL (ref 70–110)
GLUCOSE BLD STRIP.AUTO-MCNC: 88 MG/DL (ref 70–110)
GLUCOSE SERPL-MCNC: 78 MG/DL (ref 74–99)
MAGNESIUM SERPL-MCNC: 2.4 MG/DL (ref 1.6–2.6)
P-R INTERVAL, ECG05: 190 MS
POTASSIUM SERPL-SCNC: 4.4 MMOL/L (ref 3.5–5.5)
Q-T INTERVAL, ECG07: 438 MS
QRS DURATION, ECG06: 138 MS
QTC CALCULATION (BEZET), ECG08: 438 MS
SODIUM SERPL-SCNC: 140 MMOL/L (ref 136–145)
VENTRICULAR RATE, ECG03: 60 BPM

## 2020-09-20 PROCEDURE — 94762 N-INVAS EAR/PLS OXIMTRY CONT: CPT

## 2020-09-20 PROCEDURE — 74011250636 HC RX REV CODE- 250/636: Performed by: PHYSICIAN ASSISTANT

## 2020-09-20 PROCEDURE — 74011636637 HC RX REV CODE- 636/637: Performed by: PHYSICIAN ASSISTANT

## 2020-09-20 PROCEDURE — 80048 BASIC METABOLIC PNL TOTAL CA: CPT

## 2020-09-20 PROCEDURE — 77010033678 HC OXYGEN DAILY

## 2020-09-20 PROCEDURE — 71045 X-RAY EXAM CHEST 1 VIEW: CPT

## 2020-09-20 PROCEDURE — 74011250637 HC RX REV CODE- 250/637: Performed by: PHYSICIAN ASSISTANT

## 2020-09-20 PROCEDURE — 2709999900 HC NON-CHARGEABLE SUPPLY

## 2020-09-20 PROCEDURE — 74011000250 HC RX REV CODE- 250: Performed by: PHYSICIAN ASSISTANT

## 2020-09-20 PROCEDURE — 83735 ASSAY OF MAGNESIUM: CPT

## 2020-09-20 PROCEDURE — 82962 GLUCOSE BLOOD TEST: CPT

## 2020-09-20 PROCEDURE — 65620000000 HC RM CCU GENERAL

## 2020-09-20 PROCEDURE — P9045 ALBUMIN (HUMAN), 5%, 250 ML: HCPCS | Performed by: PHYSICIAN ASSISTANT

## 2020-09-20 RX ORDER — ZOLPIDEM TARTRATE 5 MG/1
10 TABLET ORAL
Status: DISCONTINUED | OUTPATIENT
Start: 2020-09-20 | End: 2020-09-22 | Stop reason: HOSPADM

## 2020-09-20 RX ORDER — METOCLOPRAMIDE HYDROCHLORIDE 5 MG/ML
10 INJECTION INTRAMUSCULAR; INTRAVENOUS EVERY 6 HOURS
Status: COMPLETED | OUTPATIENT
Start: 2020-09-20 | End: 2020-09-21

## 2020-09-20 RX ORDER — ALBUMIN HUMAN 50 G/1000ML
12.5 SOLUTION INTRAVENOUS ONCE
Status: COMPLETED | OUTPATIENT
Start: 2020-09-20 | End: 2020-09-20

## 2020-09-20 RX ADMIN — MORPHINE SULFATE 4 MG: 2 INJECTION, SOLUTION INTRAMUSCULAR; INTRAVENOUS at 03:16

## 2020-09-20 RX ADMIN — METOPROLOL TARTRATE 12.5 MG: 25 TABLET, FILM COATED ORAL at 12:05

## 2020-09-20 RX ADMIN — SODIUM CHLORIDE 75 ML/HR: 900 INJECTION, SOLUTION INTRAVENOUS at 18:08

## 2020-09-20 RX ADMIN — ASPIRIN 81 MG: 81 TABLET ORAL at 09:37

## 2020-09-20 RX ADMIN — INSULIN LISPRO 2 UNITS: 100 INJECTION, SOLUTION INTRAVENOUS; SUBCUTANEOUS at 12:05

## 2020-09-20 RX ADMIN — OXYCODONE HYDROCHLORIDE AND ACETAMINOPHEN 1 TABLET: 5; 325 TABLET ORAL at 12:13

## 2020-09-20 RX ADMIN — DOCUSATE SODIUM 100 MG: 100 CAPSULE, LIQUID FILLED ORAL at 09:38

## 2020-09-20 RX ADMIN — ATORVASTATIN CALCIUM 80 MG: 40 TABLET, FILM COATED ORAL at 17:30

## 2020-09-20 RX ADMIN — Medication 10 ML: at 13:32

## 2020-09-20 RX ADMIN — OXYCODONE HYDROCHLORIDE AND ACETAMINOPHEN 1 TABLET: 5; 325 TABLET ORAL at 17:30

## 2020-09-20 RX ADMIN — FAMOTIDINE 20 MG: 20 TABLET, FILM COATED ORAL at 09:37

## 2020-09-20 RX ADMIN — ZOLPIDEM TARTRATE 10 MG: 5 TABLET ORAL at 21:06

## 2020-09-20 RX ADMIN — AMIODARONE HYDROCHLORIDE 200 MG: 200 TABLET ORAL at 21:06

## 2020-09-20 RX ADMIN — METOCLOPRAMIDE 10 MG: 5 INJECTION, SOLUTION INTRAMUSCULAR; INTRAVENOUS at 17:30

## 2020-09-20 RX ADMIN — BUSPIRONE HYDROCHLORIDE 7.5 MG: 5 TABLET ORAL at 17:30

## 2020-09-20 RX ADMIN — Medication 10 ML: at 21:06

## 2020-09-20 RX ADMIN — METOCLOPRAMIDE 10 MG: 5 INJECTION, SOLUTION INTRAMUSCULAR; INTRAVENOUS at 12:05

## 2020-09-20 RX ADMIN — MUPIROCIN: 20 OINTMENT TOPICAL at 09:38

## 2020-09-20 RX ADMIN — INSULIN GLARGINE 10 UNITS: 100 INJECTION, SOLUTION SUBCUTANEOUS at 09:36

## 2020-09-20 RX ADMIN — AMIODARONE HYDROCHLORIDE 200 MG: 200 TABLET ORAL at 09:38

## 2020-09-20 RX ADMIN — BISACODYL 10 MG: 5 TABLET, COATED ORAL at 09:38

## 2020-09-20 RX ADMIN — CHLORHEXIDINE GLUCONATE 0.12% ORAL RINSE 10 ML: 1.2 LIQUID ORAL at 09:36

## 2020-09-20 RX ADMIN — ENOXAPARIN SODIUM 40 MG: 40 INJECTION SUBCUTANEOUS at 17:30

## 2020-09-20 RX ADMIN — MORPHINE SULFATE 2 MG: 2 INJECTION, SOLUTION INTRAMUSCULAR; INTRAVENOUS at 09:46

## 2020-09-20 RX ADMIN — POLYETHYLENE GLYCOL 3350 17 G: 17 POWDER, FOR SOLUTION ORAL at 09:36

## 2020-09-20 RX ADMIN — DOCUSATE SODIUM 100 MG: 100 CAPSULE, LIQUID FILLED ORAL at 17:29

## 2020-09-20 RX ADMIN — ALBUMIN (HUMAN) 12.5 G: 12.5 INJECTION, SOLUTION INTRAVENOUS at 12:58

## 2020-09-20 RX ADMIN — ONDANSETRON 4 MG: 2 INJECTION INTRAMUSCULAR; INTRAVENOUS at 04:07

## 2020-09-20 RX ADMIN — CHLORHEXIDINE GLUCONATE 0.12% ORAL RINSE 10 ML: 1.2 LIQUID ORAL at 17:29

## 2020-09-20 RX ADMIN — Medication 10 ML: at 05:06

## 2020-09-20 RX ADMIN — SODIUM CHLORIDE 75 ML/HR: 900 INJECTION, SOLUTION INTRAVENOUS at 05:07

## 2020-09-20 RX ADMIN — FAMOTIDINE 20 MG: 20 TABLET, FILM COATED ORAL at 17:30

## 2020-09-20 RX ADMIN — ONDANSETRON 4 MG: 2 INJECTION INTRAMUSCULAR; INTRAVENOUS at 09:44

## 2020-09-20 RX ADMIN — BUSPIRONE HYDROCHLORIDE 7.5 MG: 5 TABLET ORAL at 09:37

## 2020-09-20 RX ADMIN — METOCLOPRAMIDE 10 MG: 5 INJECTION, SOLUTION INTRAMUSCULAR; INTRAVENOUS at 23:14

## 2020-09-20 NOTE — CONSULTS
Comprehensive Nutrition Assessment    Type and Reason for Visit: Initial, Consult    Nutrition Recommendations/Plan:   - Continue current diet and monitor meal intake. - Provided heart healthy diet education. Nutrition Assessment:  S/p CABG. Poor intake of clear liquids yesterday 2/2 nausea, improved today and ate 50% of breakfast. Reports good appetite, stating he could have eaten more, but became full due to not eating in a couple days; anticipates being able to eat more at lunch. Reviewed heart healthy diet and provided educational materials. Malnutrition Assessment:  Malnutrition Status:  No malnutrition      Nutrition History and Allergies: PT denies changes in usual appetite and reports stable wt hx PTA. Has been following a modified keto diet for the last 6-7 years, but reports not being as compliant with it recently. Takes multiple vitamins at home: MVI, B complex, calcium, vitamin D and Vitamin E, pt aware of RDIs and reports validating the total quantity of the vitamins meets the RDIs. Fiance does the cooking, use minimal salt per report. Estimated Daily Nutrient Needs:  Energy (kcal):  9882-1820  Protein (g):  54-64       Fluid (ml/day):  2070-0382    Nutrition Related Findings:  BM 8/16. Mild nausea, no emesis. NS at 75 mL/hr. Meds: reglan      Wounds:    Surgical wound       Current Nutrition Therapies:   DIET CARDIAC Regular; Consistent Carb 2000kcal    Anthropometric Measures:  · Height:  5' 7\" (170.2 cm)  · Current Body Wt:  78.7 kg (173 lb 8 oz)   · Admission Body Wt:  166 lb 0.1 oz    · Usual Body Wt:  160 lb     · Ideal Body Wt:  148 lbs:  117.2 %   · BMI Category: Overweight (BMI 25.0-29. 9)       Nutrition Diagnosis:   · Inadequate energy intake, Inadequate protein-energy intake related to early satiety(nausea) as evidenced by intake 26-50%    Nutrition Interventions:   Food and/or Nutrient Delivery: Continue current diet  Nutrition Education and Counseling: Education completed  Coordination of Nutrition Care: Continued inpatient monitoring    Goals:  PO nutrition intake will meet >75% of patient estimated nutritional needs within the next 7 days. Patient will increase knowledge of appropriate food choices on a heart healthy diet prior to discharge.        Nutrition Monitoring and Evaluation:   Behavioral-Environmental Outcomes: Knowledge or skill, Readiness for change  Food/Nutrient Intake Outcomes: Diet advancement/tolerance, Food and nutrient intake, IVF intake  Physical Signs/Symptoms Outcomes: Biochemical data, Nausea/vomiting, Meal time behavior, Nutrition focused physical findings    Discharge Planning:    Continue current diet     Electronically signed by George Cameron RD on 9/20/2020 at 11:10 AM    Contact: [phone]

## 2020-09-20 NOTE — PROGRESS NOTES
1900 - shift report from Myles Cai, [address] [place] [address]. Discussed PMH, Admit Dx, events since admission, tx plan, IV access, IVF, MAR, labs, physical assessment    2000 - physical assessment. Chest tubes pulled today. Pacing wires out today. Pt stable at this time. Nausea and pain controlled. 2L O2 via NC. Pt OOB in recliner    2100 - pt ambulated in hallway before returning to bed. Denies SOB. Minimal pain. Ambien given for sleep    0000 - pt resting comfortably in bed. No changes to physical assessment    0400 - pt resting comfortably in bed. No changes to physical assessment    0600 - unable to obtain 2nd peripheral IV at this time. 3 attempts made with vein finder. CVC left in place d/t limited IV access. Adequate urine output overnight. Wilkins removed. Pt OOB to recliner.  Nausea and pain minimal.      0700 - shift report given to Ra Maldonado

## 2020-09-20 NOTE — CONSULTS
[facility] - Consult Note    Consultation request by Lieutenant Leonardo MD for advice/opinion related to evaluating CAD (coronary artery disease) [I25.10]    Date of  Admission: 9/18/2020  6:43 AM   Primary Care Physician:  Lili Mcclure MD    Subjective:  No chest pain. Nausea improved. Dyspnea is improving     Assessment:     -CAD status post CABG x4, LIMA to mid LAD, SVG to OM, SVG to RPL V and SVG to diagonal  -Hypertension  -Diabetes  -Remote history of tobacco abuse disorder  -Alcohol use: He states he is drinking about 2 to 3 cans of beer almost every day before recent unstable angina 2 weeks ago     Plan:     Chest tube and pacer wires removed today  Off pressors since yesterday. Low-dose beta-blocker started yesterday. Continue aspirin and statin  CT surgery managing postoperative course  Continue with aspirin, atorvastatin, amiodarone  No further cardiac work-up at this time. Physical therapy plan according to CT surgery team  We will be available as needed. Please call us back with question. History of Present Illness: This is a 72 y.o. male admitted for CAD (coronary artery disease) [I25.10]. Patient complains of:      72-year-old male with history of CAD who underwent elective outpatient CABG x4 yesterday. Patient was admitted to the hospital 2 weeks ago and had a cardiac catheterization which showed severe three-vessel coronary disease. CABG was performed.   This morning patient has some surgical site pain however denies any symptoms concerning for angina    Cardiac risk factors: dyslipidemia      Review of Symptoms:  Except as stated above include:  Constitutional:  negative  Respiratory:  negative  Cardiovascular:  negative  Gastrointestinal: negative  Genitourinary:  negative  Musculoskeletal:  Negative  Neurological:  Negative  Dermatological:  Negative  Endocrinological: Negative  Psychological:  Negative    A comprehensive review of systems was negative except for that written in the HPI. Past Medical History:     Past Medical History:   Diagnosis Date    Allergic rhinitis     Asthma     Diabetes type 2, uncontrolled ([facility]     Last HbA1c 7.6% per Patient in late 2020    Diabetic retinal damage of both eyes ([facility]     Enlarged prostate     ETOH abuse     2-3 Beers/day x>30 years    Former smoker     2/3 PPD x4-5 years; Quit ~    Generalized anxiety disorder with panic attacks     History of dermatomyositis     Patient doubts this diagnosis    HLD (hyperlipidemia)     Hypertension     NSTEMI (non-ST elevated myocardial infarction) ([facility] 2020    Statin intolerance     \"Muscular & Neurological Damage\" due to Statins         Social History:     Social History     Socioeconomic History    Marital status:      Spouse name: Not on file    Number of children: Not on file    Years of education: Not on file    Highest education level: Not on file   Tobacco Use    Smoking status: Former Smoker     Packs/day: 0.67     Years: 4.50     Pack years: 3.01     Last attempt to quit:      Years since quittin.7    Smokeless tobacco: Never Used    Tobacco comment: 2/3 PPD x4-5 years; Quit    Substance and Sexual Activity    Alcohol use:  Yes     Alcohol/week: 21.0 - 25.0 standard drinks     Types: 21 - 25 Cans of beer per week     Frequency: 4 or more times a week     Drinks per session: 3 or 4     Binge frequency: Daily or almost daily     Comment: 2-3 Beers/day x30 years    Drug use: Never   Other Topics Concern        Family History:     Family History   Problem Relation Age of Onset    Heart Failure Father     Coronary Artery Disease Father 76    Heart Failure Other     Stroke Mother     Coronary Artery Disease Mother 61        Medications:   No Known Allergies        Physical Exam:     Visit Vitals  BP (!) 109/55   Pulse 72   Temp 99.4 °F (37.4 °C)   Resp 20   Ht 5' 7\" (1.702 m)   Wt 173 lb 6.4 oz (78.7 kg)   SpO2 92%   BMI 27.16 kg/m²     BP Readings from Last 3 Encounters:   09/20/20 (!) 109/55   09/14/20 127/72   09/04/20 90/60     Pulse Readings from Last 3 Encounters:   09/20/20 72   09/14/20 70   09/04/20 69     Wt Readings from Last 3 Encounters:   09/20/20 173 lb 6.4 oz (78.7 kg)   09/03/20 163 lb (73.9 kg)       General:  alert, cooperative, no distress, appears stated age  Neck:  no carotid bruit, no JVD  Lungs: Decreased breath sound at the base  Heart:  regular rate and rhythm, S1, S2 normal, no murmur, click, rub or gallop  Abdomen:  abdomen is soft without significant tenderness,   Extremities: No significant edema  Skin: Warm and dry.  no hyperpigmentation, vitiligo, or suspicious lesions     Data Review:     Recent Labs     09/19/20  0530 09/18/20  1405   WBC 9.0 11.3   HGB 11.0* 11.4*   HCT 33.1* 34.3*    146     Recent Labs     09/20/20  0340 09/19/20  0530 09/18/20  1405    141 143   K 4.4 4.3 3.6    111 109   CO2 27 25 27   GLU 78 88 185*   BUN 11 11 14   CREA 0.70 0.83 0.91   CA 7.5* 7.8* 7.1*   MG 2.4 2.4 2.7*   INR  --   --  1.2       Results for orders placed or performed during the hospital encounter of 09/18/20   EKG, 12 LEAD, INITIAL   Result Value Ref Range    Ventricular Rate 50 BPM    Atrial Rate 50 BPM    P-R Interval 282 ms    QRS Duration 148 ms    Q-T Interval 486 ms    QTC Calculation (Bezet) 443 ms    Calculated P Axis 45 degrees    Calculated R Axis -163 degrees    Calculated T Axis 53 degrees    Diagnosis       Sinus bradycardia with 1st degree AV block  Indeterminate axis  Right bundle branch block  Abnormal ECG  When compared with ECG of 03-SEP-2020 11:41,  Questionable change in QRS axis  Confirmed by Amalia Stevens ([id number]) on 9/19/2020 4:49:01 PM         All Cardiac Markers in the last 24 hours:  No results found for: CPK, CK, CKMMB, CKMB, RCK3, CKMBT, CKNDX, CKND1, DEBBIE, TROPT, TROIQ, ALDA, TROPT, TNIPOC, BNP, BNPP    Last Lipid:    Lab Results   Component Value Date/Time Cholesterol, total 192 09/03/2020 03:50 AM    HDL Cholesterol 56 09/03/2020 03:50 AM    LDL, calculated 86.2 09/03/2020 03:50 AM    Triglyceride 249 (H) 09/03/2020 03:50 AM    CHOL/HDL Ratio 3.4 09/03/2020 03:50 AM       Signed By: Bipin Gates MD     September 20, 2020

## 2020-09-20 NOTE — PROGRESS NOTES
CARDIAC SURGERY PROGRESS NOTE    2020  9:47 AM     Post Operative Day # 2     Chart reviewed. Rounded with Dr. Paul Zayas. Interval History/Events of Past 24 hours:   Walks in [place] with 1 l/m supplemental oxygen. Urine output improved with the addition of IVF. Nausea resolved and tolerated 50% of breakfast.  +flatus but no BM. Off betty. Chest tubes tapered. CXR without ptx. Small LLL and/or atelect. Gas in bowel. Better pain control. No signs of alcohol withdrawal or AMS. Subjective:  Patient seen and examined on rounds today. No complaints  Pain level: controlled  Ambulating: well   Sleeping: well  Sternal pain: Yes    Objective:  Vital signs:   Visit Vitals  /60   Pulse 90   Temp 99.4 °F (37.4 °C)   Resp 21   Ht 5' 7\" (1.702 m)   Wt 78.7 kg (173 lb 6.4 oz)   SpO2 94%   BMI 27.16 kg/m²     Temp (24hrs), Av.7 °F (37.1 °C), Min:98.2 °F (36.8 °C), Max:99.4 °F (37.4 °C)    Admission Weight: Last Weight   Weight: 75.3 kg (166 lb) Weight: 78.7 kg (173 lb 6.4 oz)     Telemetry: SR 70    Physical Examination:     General:  Alert, oriented   Lungs: rales left based otherwise clear to ascultation without wheezes or rhonchi. Chest: Dressings clean and dry. Heart: regular rate and rhythm, No murmur. Abdomen: Soft and non-tender without masses. Bowel sounds present. Extremities: Warm and well perfused. Edema mild. Incisions: clean, dry, no drainage. Neuro: No deficit. Beta-blocker: Yes  ASA: Yes   Statin: Yes  ACE-I: No  Diuretic: No     DVT Prophylaxis:   pneumatic compression boots: Yes  Compression stockings:  Yes  Enoxaparin:  Yes    Chest tubes:  present.   Air Leak:  No    Pacing wires:  present    DME needs:  TBD          Labs:  Lab Results   Component Value Date/Time    WBC 9.0 2020 05:30 AM    HCT 33.1 (L) 2020 05:30 AM     2020 05:30 AM      Lab Results   Component Value Date/Time     2020 03:40 AM    K 4.4 2020 03:40 AM     09/20/2020 03:40 AM    CO2 27 09/20/2020 03:40 AM    GLU 78 09/20/2020 03:40 AM    BUN 11 09/20/2020 03:40 AM    CREA 0.70 09/20/2020 03:40 AM    CREA 0.83 09/19/2020 05:30 AM    CREA 0.91 09/18/2020 02:05 PM     CXR: without ptx. Small LLL and/or atelect. Gas in bowel     Assessment:  S/P  CABG x 4  Respiratory parameters stable  Cardiac status stable     Plans:  1. Chest tubes and pacing wires removed without difficulty. 2.  reglan x 4. Cont H2B and scop patch. Cont cathartics  3.  BB, ASA, statin and amio prophylaxis. 4.  encouraged IS and walking    Heart Hugger use encouraged to mitigate pain and will also assist with deep breathing and incentive spirometry goals. Possible discharge 2 days.     Adamaris Ramírez PA-C
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
## 2020-09-20 NOTE — PROGRESS NOTES
Problem: Diabetes Self-Management  Goal: *Disease process and treatment process  Description: Define diabetes and identify own type of diabetes; list 3 options for treating diabetes. Outcome: Progressing Towards Goal  Goal: *Incorporating nutritional management into lifestyle  Description: Describe effect of type, amount and timing of food on blood glucose; list 3 methods for planning meals. Outcome: Progressing Towards Goal  Goal: *Incorporating physical activity into lifestyle  Description: State effect of exercise on blood glucose levels. Outcome: Progressing Towards Goal  Goal: *Developing strategies to promote health/change behavior  Description: Define the ABC's of diabetes; identify appropriate screenings, schedule and personal plan for screenings. Outcome: Progressing Towards Goal  Goal: *Using medications safely  Description: State effect of diabetes medications on diabetes; name diabetes medication taking, action and side effects. Outcome: Progressing Towards Goal  Goal: *Monitoring blood glucose, interpreting and using results  Description: Identify recommended blood glucose targets  and personal targets. Outcome: Progressing Towards Goal  Goal: *Prevention, detection, treatment of acute complications  Description: List symptoms of hyper- and hypoglycemia; describe how to treat low blood sugar and actions for lowering  high blood glucose level. Outcome: Progressing Towards Goal  Goal: *Prevention, detection and treatment of chronic complications  Description: Define the natural course of diabetes and describe the relationship of blood glucose levels to long term complications of diabetes.   Outcome: Progressing Towards Goal  Goal: *Developing strategies to address psychosocial issues  Description: Describe feelings about living with diabetes; identify support needed and support network  Outcome: Progressing Towards Goal  Goal: *Insulin pump training  Outcome: Progressing Towards Goal  Goal: *Sick day guidelines  Outcome: Progressing Towards Goal  Goal: *Patient Specific Goal (EDIT GOAL, INSERT TEXT)  Outcome: Progressing Towards Goal     Problem: Patient Education: Go to Patient Education Activity  Goal: Patient/Family Education  Outcome: Progressing Towards Goal     Problem: Ventilator Management  Goal: *Adequate oxygenation and ventilation  Outcome: Progressing Towards Goal  Goal: *Patient maintains clear airway/free of aspiration  Outcome: Progressing Towards Goal  Goal: *Absence of infection signs and symptoms  Outcome: Progressing Towards Goal  Goal: *Normal spontaneous ventilation  Outcome: Progressing Towards Goal     Problem: Patient Education: Go to Patient Education Activity  Goal: Patient/Family Education  Outcome: Progressing Towards Goal     Problem: Pressure Injury - Risk of  Goal: *Prevention of pressure injury  Description: Document Jadiel Scale and appropriate interventions in the flowsheet. Outcome: Progressing Towards Goal  Note: Pressure Injury Interventions:  Sensory Interventions: Assess changes in LOC         Activity Interventions: Chair cushion, Pressure redistribution bed/mattress(bed type), PT/OT evaluation    Mobility Interventions: Chair cushion, Pressure redistribution bed/mattress (bed type), PT/OT evaluation, Turn and reposition approx. every two hours(pillow and wedges)    Nutrition Interventions: Document food/fluid/supplement intake    Friction and Shear Interventions: Apply protective barrier, creams and emollients, Foam dressings/transparent film/skin sealants, Minimize layers                Problem: Patient Education: Go to Patient Education Activity  Goal: Patient/Family Education  Outcome: Progressing Towards Goal     Problem: Falls - Risk of  Goal: *Absence of Falls  Description: Document Raimundo Fall Risk and appropriate interventions in the flowsheet.   Outcome: Progressing Towards Goal  Note: Fall Risk Interventions:  Mobility Interventions: Assess mobility with egress test, Communicate number of staff needed for ambulation/transfer, Patient to call before getting OOB, OT consult for ADLs, PT Consult for mobility concerns, PT Consult for assist device competence, Utilize walker, cane, or other assistive device    Mentation Interventions: Adequate sleep, hydration, pain control    Medication Interventions: Assess postural VS orthostatic hypotension, Evaluate medications/consider consulting pharmacy, Patient to call before getting OOB, Teach patient to arise slowly    Elimination Interventions: Call light in reach, Patient to call for help with toileting needs              Problem: Patient Education: Go to Patient Education Activity  Goal: Patient/Family Education  Outcome: Progressing Towards Goal     Problem: Patient Education: Go to Patient Education Activity  Goal: Patient/Family Education  Outcome: Progressing Towards Goal     Problem: Patient Education: Go to Patient Education Activity  Goal: Patient/Family Education  Outcome: Progressing Towards Goal     Problem: Nutrition Deficit  Goal: *Optimize nutritional status  Outcome: Progressing Towards Goal

## 2020-09-21 ENCOUNTER — HOME HEALTH ADMISSION (OUTPATIENT)
Dept: HOME HEALTH SERVICES | Facility: HOME HEALTH | Age: 70
End: 2020-09-21
Payer: MEDICARE

## 2020-09-21 LAB
BASOPHILS # BLD: 0 K/UL (ref 0–0.1)
BASOPHILS NFR BLD: 0 % (ref 0–2)
DIFFERENTIAL METHOD BLD: ABNORMAL
EOSINOPHIL # BLD: 0.1 K/UL (ref 0–0.4)
EOSINOPHIL NFR BLD: 1 % (ref 0–5)
ERYTHROCYTE [DISTWIDTH] IN BLOOD BY AUTOMATED COUNT: 13.6 % (ref 11.6–14.5)
GLUCOSE BLD STRIP.AUTO-MCNC: 113 MG/DL (ref 70–110)
GLUCOSE BLD STRIP.AUTO-MCNC: 129 MG/DL (ref 70–110)
GLUCOSE BLD STRIP.AUTO-MCNC: 160 MG/DL (ref 70–110)
GLUCOSE BLD STRIP.AUTO-MCNC: 85 MG/DL (ref 70–110)
HCT VFR BLD AUTO: 28.3 % (ref 36–48)
HGB BLD-MCNC: 9.2 G/DL (ref 13–16)
LYMPHOCYTES # BLD: 1 K/UL (ref 0.9–3.6)
LYMPHOCYTES NFR BLD: 15 % (ref 21–52)
MCH RBC QN AUTO: 31 PG (ref 24–34)
MCHC RBC AUTO-ENTMCNC: 32.5 G/DL (ref 31–37)
MCV RBC AUTO: 95.3 FL (ref 74–97)
MONOCYTES # BLD: 0.5 K/UL (ref 0.05–1.2)
MONOCYTES NFR BLD: 8 % (ref 3–10)
NEUTS SEG # BLD: 4.9 K/UL (ref 1.8–8)
NEUTS SEG NFR BLD: 76 % (ref 40–73)
PLATELET # BLD AUTO: 125 K/UL (ref 135–420)
PMV BLD AUTO: 10.2 FL (ref 9.2–11.8)
RBC # BLD AUTO: 2.97 M/UL (ref 4.7–5.5)
WBC # BLD AUTO: 6.4 K/UL (ref 4.6–13.2)

## 2020-09-21 PROCEDURE — 97535 SELF CARE MNGMENT TRAINING: CPT

## 2020-09-21 PROCEDURE — 74011636637 HC RX REV CODE- 636/637: Performed by: PHYSICIAN ASSISTANT

## 2020-09-21 PROCEDURE — 65620000000 HC RM CCU GENERAL

## 2020-09-21 PROCEDURE — 74011250637 HC RX REV CODE- 250/637: Performed by: PHYSICIAN ASSISTANT

## 2020-09-21 PROCEDURE — 97116 GAIT TRAINING THERAPY: CPT

## 2020-09-21 PROCEDURE — 74011000250 HC RX REV CODE- 250: Performed by: PHYSICIAN ASSISTANT

## 2020-09-21 PROCEDURE — 74011250636 HC RX REV CODE- 250/636: Performed by: PHYSICIAN ASSISTANT

## 2020-09-21 PROCEDURE — 82962 GLUCOSE BLOOD TEST: CPT

## 2020-09-21 PROCEDURE — 85025 COMPLETE CBC W/AUTO DIFF WBC: CPT

## 2020-09-21 PROCEDURE — 2709999900 HC NON-CHARGEABLE SUPPLY

## 2020-09-21 RX ORDER — METOPROLOL TARTRATE 25 MG/1
6.25 TABLET, FILM COATED ORAL EVERY 12 HOURS
Status: DISCONTINUED | OUTPATIENT
Start: 2020-09-21 | End: 2020-09-21

## 2020-09-21 RX ADMIN — Medication 10 ML: at 06:00

## 2020-09-21 RX ADMIN — INSULIN LISPRO 2 UNITS: 100 INJECTION, SOLUTION INTRAVENOUS; SUBCUTANEOUS at 22:00

## 2020-09-21 RX ADMIN — AMIODARONE HYDROCHLORIDE 200 MG: 200 TABLET ORAL at 08:05

## 2020-09-21 RX ADMIN — FAMOTIDINE 20 MG: 20 TABLET, FILM COATED ORAL at 08:05

## 2020-09-21 RX ADMIN — Medication 10 ML: at 14:00

## 2020-09-21 RX ADMIN — INSULIN GLARGINE 10 UNITS: 100 INJECTION, SOLUTION SUBCUTANEOUS at 08:04

## 2020-09-21 RX ADMIN — DOCUSATE SODIUM 100 MG: 100 CAPSULE, LIQUID FILLED ORAL at 08:05

## 2020-09-21 RX ADMIN — OXYCODONE HYDROCHLORIDE AND ACETAMINOPHEN 1 TABLET: 5; 325 TABLET ORAL at 08:05

## 2020-09-21 RX ADMIN — DOCUSATE SODIUM 100 MG: 100 CAPSULE, LIQUID FILLED ORAL at 17:47

## 2020-09-21 RX ADMIN — AMIODARONE HYDROCHLORIDE 200 MG: 200 TABLET ORAL at 16:00

## 2020-09-21 RX ADMIN — ZOLPIDEM TARTRATE 10 MG: 5 TABLET ORAL at 21:08

## 2020-09-21 RX ADMIN — BISACODYL 10 MG: 5 TABLET, COATED ORAL at 08:05

## 2020-09-21 RX ADMIN — CHLORHEXIDINE GLUCONATE 0.12% ORAL RINSE 10 ML: 1.2 LIQUID ORAL at 17:47

## 2020-09-21 RX ADMIN — ENOXAPARIN SODIUM 40 MG: 40 INJECTION SUBCUTANEOUS at 17:47

## 2020-09-21 RX ADMIN — AMIODARONE HYDROCHLORIDE 200 MG: 200 TABLET ORAL at 21:08

## 2020-09-21 RX ADMIN — ACETAMINOPHEN 650 MG: 325 TABLET ORAL at 16:05

## 2020-09-21 RX ADMIN — METOCLOPRAMIDE 10 MG: 5 INJECTION, SOLUTION INTRAMUSCULAR; INTRAVENOUS at 06:27

## 2020-09-21 RX ADMIN — BUSPIRONE HYDROCHLORIDE 7.5 MG: 5 TABLET ORAL at 08:05

## 2020-09-21 RX ADMIN — ATORVASTATIN CALCIUM 80 MG: 40 TABLET, FILM COATED ORAL at 17:48

## 2020-09-21 RX ADMIN — ASPIRIN 81 MG: 81 TABLET ORAL at 08:05

## 2020-09-21 RX ADMIN — SODIUM CHLORIDE 75 ML/HR: 900 INJECTION, SOLUTION INTRAVENOUS at 06:27

## 2020-09-21 RX ADMIN — FAMOTIDINE 20 MG: 20 TABLET, FILM COATED ORAL at 17:48

## 2020-09-21 RX ADMIN — METOPROLOL TARTRATE 6.25 MG: 25 TABLET, FILM COATED ORAL at 11:59

## 2020-09-21 RX ADMIN — CHLORHEXIDINE GLUCONATE 0.12% ORAL RINSE 10 ML: 1.2 LIQUID ORAL at 08:04

## 2020-09-21 RX ADMIN — POLYETHYLENE GLYCOL 3350 17 G: 17 POWDER, FOR SOLUTION ORAL at 08:03

## 2020-09-21 RX ADMIN — BUSPIRONE HYDROCHLORIDE 7.5 MG: 5 TABLET ORAL at 17:48

## 2020-09-21 RX ADMIN — Medication 10 ML: at 21:09

## 2020-09-21 NOTE — DIABETES MGMT
Glycemic Control Plan of Care    Noted history of T2DM with current A1c of 7.1% (9/03/2020)   Patient seen this afternoon and reported that he was previously on Metformin until it was discontinued. He will consider taking it again if prescribed. He will also resume home BG monitoring and accepted the Contour next EZ. This is a sample kit with 10 lancets and 10 test strips. Patient verbalized understanding that he will need prescriptions for additional testing supplies if he decide to use this brand. Home diabetes medications: None per patient's report. Recommendation(s): cont current insulin orders: daily lantus and sliding scale lispro. Glycemic assessment:    Patient is s/p CABG x4 on 9/18/2020. He was placed on regular insulin drip via [place] and transitioned to SC insulin on 9/19. POC BG range on 9/20:   POC BG on 9/21 at time of review: 85, 129        Current A1C: 7.1% (9/03/2020) which is equivalent to estimated average blood glucose of 157 mg/dL during the past 2-3 months    Current hospital diabetes medications:  Basal lantus insulin 10 units daily  Correctional lispro insulin ACHS.  Normal sensitivity dose    Total daily dose insulin requirement previous day: 9/20:  Lantus: 10 units  Lispro: 2 units  TDD insulin: 12 units    Diet: Cardiac regular 2000kcal regular; no concentrated sweets    Goals:  Blood glucose will be within target range of  mg/dL by 9/24/2020    Education:    ___  Refer to Diabetes Education Record  _X__  Education not indicated at this time    Kelsey Mena RN [facility]  Pager: [phone]

## 2020-09-21 NOTE — PROGRESS NOTES
[id number]:  Report received, care assumed. Complete assessment performed. Sitting in recliner, legs elevated. Lungs clear. Pulse ox sats 97% on 2L/min NC. SR with first degree AVB, BBB. VSS. Using heart hugger and incentive spirometer as instructed. Ambulated >200 ft using rollator, RN and monitor at side. Tolerated well. Returned to [facility]. Breakfast served. Pulse ox sats 100%. 2L/min NC removed to room air. Percocet 1tab given for c/o pain 4/10. Full fall and aspiration precautions in effect. 0845:  Assisted to toilet for large soft BM and \"small\" amount urine passed in toilet with BM. Impulsive regarding mobility. Reinforced info regarding safety/ allow staff help ambulate/ sternal precautions. Pt verbalizes understanding, states he'll call for assistance. Call bell and cell phone at side. In recliner, legs elevated. 1030:  Physical therapy working with patient; tolerated walking and steps well. Returned to [facility]. 1115:  CVT surgical providers on rounds. New orders noted. Maintenance IVF discontinued now. Patient now watching Discharge Open Heart Surgery Video. Anticipated discharge home tomorrow. Pt aware and agreeable to plan of care. 1200:  Lunch served. Eating well without nausea. FORD Goins notified BP and temp, see flowsheet. Provider will order Lopressor discontinued. 1300:  Ambulated to toilet with RN at side. No BM. Passed urine in toilet, forgot to use urinal. Return to bed. PIV obtained #22 right forearm, saline lock. Right IJ Cordis discontinued per policy. Bedrest x1hr. 1400:  Occupational therapy assisted OOB to recliner; tolerated therapy well. Remains in recliner, legs elevated. 1530:  Ambulated >200ft using rollator, RN and monitor at side. Tolerated well. Returned to recliner, legs elevated. 1900:  Bedside and Verbal shift change report given to Ab[address] (oncoming nurse) by Carmelina Sanon RN(offgoing nurse).  Report included the following information SBAR, Kardex, Intake/Output, MAR, Accordion, Recent Results, Cardiac Rhythm SR with first degree and BBB. and Alarm Parameters .
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
## 2020-09-21 NOTE — PROGRESS NOTES
Problem: Mobility Impaired (Adult and Pediatric)  Goal: *Acute Goals and Plan of Care (Insert Text)  Description: Physical Therapy Goals  Initiated 9/19/2020 and to be accomplished within 7 day(s)  1. Patient will move from supine to sit and sit to supine  in bed with minimal assistance/contact guard assist while adhering to sternal precautions. 2.  Patient will transfer from bed to chair and chair to bed with minimal assistance/contact guard assist using the least restrictive device. 3.  Patient will perform sit to stand with supervision/set-up. 4.  Patient will ambulate with supervision/set-up for 250 feet with the least restrictive device. 5.  Patient will ascend/descend 5 stairs with one handrail(s) with minimal assistance/contact guard assist to prepare for steps at home. 6.  Patient will be independent in adhering to sternal precautions with functional mobility. Prior Level of Function:   Patient was independent for all mobility including gait without DME. Patient lives with fiance in a 2nd floor walk-up apartment with 2 full flights of steps. Works as a . Outcome: Progressing Towards Goal     PHYSICAL THERAPY TREATMENT    Patient: Charles Rodriguez (14 y.o. male)  Date: 9/21/2020  Diagnosis: CAD (coronary artery disease) [I25.10]   <principal problem not specified>  Procedure(s) (LRB):  CORONARY ARTERY BYPASS GRAFT (CABG) TIMES FOUR (N/A) 3 Days Post-Op  Precautions: Fall, Sternal      ASSESSMENT:  Pt received up in chair on RA, cleared by nursing to participate. Pt was agreeable to tx session. Pt stands up this date with CGA and progressed to SBA by end of session with verbal cues for hand placement on heart hugger to ensure no pushing of UEs was completed. Pt progresses to gait training x 150 ft at decreased speed for energy conservation and SBA/sup for safety, no LOB noted. Pt then able to complete stair training x 4 steps with step to step pattern and CGA for safety.  Pt attempting to descend without holding onto any railings and encouraged to hold on to ensure safety and prevent falls. Pt then returned to room and positioned back in chair with all nees met, legs elevated. Will continue to follow per POC and recommend [facility] PT upon discharge. Nurse notified of pt progress. Progression toward goals:   [x]      Improving appropriately and progressing toward goals  []      Improving slowly and progressing toward goals  []      Not making progress toward goals and plan of care will be adjusted     PLAN:  Patient continues to benefit from skilled intervention to address the above impairments. Continue treatment per established plan of care. Discharge Recommendations:  Home Health  Further Equipment Recommendations for Discharge:  rolling walker if patient does not complete     SUBJECTIVE:   Patient stated Alisa Nova will be fine on  the stairs.     OBJECTIVE DATA SUMMARY:   Critical Behavior:  Neurologic State: Alert, Eyes open spontaneously  Orientation Level: Oriented X4  Cognition: Follows commands  Safety/Judgement: Fall prevention  Functional Mobility Training:    Transfers:  Sit to Stand: Contact guard assistance;Stand-by assistance  Stand to Sit: Stand-by assistance    Balance:  Sitting: Intact  Standing: Intact; With support         Ambulation/Gait Training:  Distance (ft): 150 Feet (ft)  Assistive Device: Walker, rollator  Ambulation - Level of Assistance: Stand-by assistance;Supervision        Gait Abnormalities: Decreased step clearance        Base of Support: Narrowed     Speed/Maryanne: Pace decreased (<100 feet/min)  Step Length: Right shortened;Left shortened    Pain:  Pain level pre-treatment: not rated numerically- anterior incisional pain   Pain level post-treatment: \"    \"   Pain Intervention(s): Medication (see MAR);  Rest, Repositioning  Response to intervention: Nurse notified    Activity Tolerance:   Good    Please refer to the flowsheet for vital signs taken during this treatment. After treatment:   [x] Patient left in no apparent distress sitting up in chair  [] Patient left in no apparent distress in bed  [x] Call bell left within reach  [x] Nursing notified  [] Caregiver present  [] Bed alarm activated  [] SCDs applied      COMMUNICATION/EDUCATION:   [x]         Role of Physical Therapy in the acute care setting. [x]         Fall prevention education was provided and the patient/caregiver indicated understanding. [x]         Patient/family have participated as able in working toward goals and plan of care. [x]         Patient/family agree to work toward stated goals and plan of care. []         Patient understands intent and goals of therapy, but is neutral about his/her participation.   []         Patient is unable to participate in stated goals/plan of care: ongoing with therapy staff.  []         Other:        Urbano Richardson   Time Calculation: 24 mins

## 2020-09-21 NOTE — PROGRESS NOTES
CARDIAC SURGERY PROGRESS NOTE    2020  11:13 AM     Post Operative Day # 3     Chart reviewed. Rounded with Dr. Spring Edouard. Interval History/Events of Past 24 hours:   Walks in [place] without supplemental oxygen. + BM, no nausea. Tolerates diet. Good pain control    Subjective:  Patient seen and examined on rounds today. No complaints  Pain level: controlled  Ambulating: well   Sleeping: well  Eating:  well  Sternal pain: Yes    BM: Yes    Objective:  Vital signs:   Visit Vitals  BP (!) 107/59   Pulse 71   Temp 99.2 °F (37.3 °C)   Resp 13   Ht 5' 7\" (1.702 m)   Wt 80.7 kg (177 lb 14.4 oz)   SpO2 95%   BMI 27.86 kg/m²     Temp (24hrs), Av.6 °F (37.6 °C), Min:98.6 °F (37 °C), Max:100.2 °F (37.9 °C)    Admission Weight: Last Weight   Weight: 75.3 kg (166 lb) Weight: 80.7 kg (177 lb 14.4 oz)     Telemetry: SR 76    Physical Examination:     General:  Alert, oriented   Lungs: Clear to ascultation without rales, wheezes or rhonchi. Chest: Dressings clean and dry. Heart: regular rate and rhythm, no murmur. Abdomen: Soft and non-tender without masses. Bowel sounds present. Extremities: Warm and well perfused. Edema absent. Incisions: clean, dry, no drainage. Neuro: No deficit. Beta-blocker:  Yes  ASA: Yes   Statin: Yes  ACE-I: No  Diuretic: No     DVT Prophylaxis:   pneumatic compression boots: Yes  Compression stockings:  Yes  Enoxaparin:  Yes    DME needs:  TBD          Labs:  Lab Results   Component Value Date/Time    WBC 6.4 2020 06:00 AM    HCT 28.3 (L) 2020 06:00 AM     (L) 2020 06:00 AM      Lab Results   Component Value Date/Time     2020 03:40 AM    K 4.4 2020 03:40 AM     2020 03:40 AM    CO2 27 2020 03:40 AM    GLU 78 2020 03:40 AM    BUN 11 2020 03:40 AM    CREA 0.70 2020 03:40 AM    CREA 0.83 2020 05:30 AM    CREA 0.91 2020 02:05 PM     Assessment:  S/P  CABG x 4  Respiratory parameters stable  Cardiac status stable     Plans:  1. Stop IVF and remove cordis. Stop scop patch. 2.  Encouraged IS and walking. 3.  ASA, statin and amio prophylaxis as ordered. Low dose BB. 4. Discharge video today. Did well on stairs with PT today. Will need assistance to get up to 3rd floor appt via stairs. Heart Hugger use encouraged to mitigate pain and will also assist with deep breathing and incentive spirometry goals. Possible discharge 1 days.     Avtar Martin PA-C

## 2020-09-21 NOTE — HOME CARE
Received referral for [facility] for SN, PT, and OT - [place] protocol. Demographics verified, explained home health services, and answered all questions. Patient's girlfriend Jennifer  will assist patient at home. CM will provide rolling walker before discharge. [facility] will follow.      Niecy Cerda LPN   [facility] Liaison  [phone]

## 2020-09-21 NOTE — PROGRESS NOTES
Problem: Diabetes Self-Management  Goal: *Disease process and treatment process  Description: Define diabetes and identify own type of diabetes; list 3 options for treating diabetes. Outcome: Progressing Towards Goal  Goal: *Incorporating nutritional management into lifestyle  Description: Describe effect of type, amount and timing of food on blood glucose; list 3 methods for planning meals. Outcome: Progressing Towards Goal  Goal: *Incorporating physical activity into lifestyle  Description: State effect of exercise on blood glucose levels. Outcome: Progressing Towards Goal  Goal: *Developing strategies to promote health/change behavior  Description: Define the ABC's of diabetes; identify appropriate screenings, schedule and personal plan for screenings. Outcome: Progressing Towards Goal  Goal: *Using medications safely  Description: State effect of diabetes medications on diabetes; name diabetes medication taking, action and side effects. Outcome: Progressing Towards Goal  Goal: *Monitoring blood glucose, interpreting and using results  Description: Identify recommended blood glucose targets  and personal targets. Outcome: Progressing Towards Goal  Goal: *Prevention, detection, treatment of acute complications  Description: List symptoms of hyper- and hypoglycemia; describe how to treat low blood sugar and actions for lowering  high blood glucose level. Outcome: Progressing Towards Goal  Goal: *Prevention, detection and treatment of chronic complications  Description: Define the natural course of diabetes and describe the relationship of blood glucose levels to long term complications of diabetes.   Outcome: Progressing Towards Goal  Goal: *Developing strategies to address psychosocial issues  Description: Describe feelings about living with diabetes; identify support needed and support network  Outcome: Progressing Towards Goal  Goal: *Insulin pump training  Outcome: Progressing Towards Goal  Goal: *Sick day guidelines  Outcome: Progressing Towards Goal  Goal: *Patient Specific Goal (EDIT GOAL, INSERT TEXT)  Outcome: Progressing Towards Goal     Problem: Patient Education: Go to Patient Education Activity  Goal: Patient/Family Education  Outcome: Progressing Towards Goal     Problem: Ventilator Management  Goal: *Adequate oxygenation and ventilation  Outcome: Progressing Towards Goal  Goal: *Patient maintains clear airway/free of aspiration  Outcome: Progressing Towards Goal  Goal: *Absence of infection signs and symptoms  Outcome: Progressing Towards Goal  Goal: *Normal spontaneous ventilation  Outcome: Progressing Towards Goal     Problem: Patient Education: Go to Patient Education Activity  Goal: Patient/Family Education  Outcome: Progressing Towards Goal     Problem: Pressure Injury - Risk of  Goal: *Prevention of pressure injury  Description: Document Jadiel Scale and appropriate interventions in the flowsheet. Outcome: Progressing Towards Goal  Note: Pressure Injury Interventions:  Sensory Interventions: Assess changes in LOC, Assess need for specialty bed, Avoid rigorous massage over bony prominences, Chair cushion, Check visual cues for pain, Discuss PT/OT consult with provider, Float heels, Keep linens dry and wrinkle-free, Maintain/enhance activity level, Minimize linen layers, Monitor skin under medical devices, Pad between skin to skin, Pressure redistribution bed/mattress (bed type), Sit a 90-degree angle/use footstool if needed, Turn and reposition approx. every two hours (pillows and wedges if needed)         Activity Interventions: Assess need for specialty bed, Chair cushion, Increase time out of bed, Pressure redistribution bed/mattress(bed type), PT/OT evaluation    Mobility Interventions: Assess need for specialty bed, Chair cushion, Float heels, HOB 30 degrees or less, Pressure redistribution bed/mattress (bed type), PT/OT evaluation, Turn and reposition approx.  every two hours(pillow and wedges)    Nutrition Interventions: Document food/fluid/supplement intake, Discuss nutritional consult with provider, Offer support with meals,snacks and hydration    Friction and Shear Interventions: Feet elevated on foot rest, Foam dressings/transparent film/skin sealants, Lift sheet, Minimize layers, Sit at 90-degree angle, Transferring/repositioning devices                Problem: Patient Education: Go to Patient Education Activity  Goal: Patient/Family Education  Outcome: Progressing Towards Goal     Problem: Falls - Risk of  Goal: *Absence of Falls  Description: Document Rosangela Hayes Fall Risk and appropriate interventions in the flowsheet.   Outcome: Progressing Towards Goal     Problem: Patient Education: Go to Patient Education Activity  Goal: Patient/Family Education  Outcome: Progressing Towards Goal     Problem: Patient Education: Go to Patient Education Activity  Goal: Patient/Family Education  Outcome: Progressing Towards Goal     Problem: Patient Education: Go to Patient Education Activity  Goal: Patient/Family Education  Outcome: Progressing Towards Goal     Problem: Nutrition Deficit  Goal: *Optimize nutritional status  Outcome: Progressing Towards Goal

## 2020-09-21 NOTE — PROGRESS NOTES
Patient was in process of moving when heart troubles happened, had whole apartment packed up, now having to stay in apartment for recovery. Per patient, family moving in a bed frame today so patient does not continue to sleep on mattress on the ground. Patient has 2.5 flights of stairs at home, was offered ambulance ride to get patient home, patient declined and stated he thinks he will be fine with stairs because he practiced them today with therapy. Family to support at home and transport. Will need walker, referral made to [facility]. Oxygen needs to be determined prior to d/c (may not have qualifying home oxygen diagnosis). Reason for Admission:  CAD (coronary artery disease) [I25.10]                 RUR Score:    19%            Plan for utilizing home health: Yes referral sent to [facility]                      Likelihood of Readmission:   Moderate                         Do you (patient/family) have any concerns for transition/discharge?  no    Transition of Care Plan:       Initial assessment completed with patient. Cognitive status of patient: oriented to time, place, person and situation. Face sheet information confirmed:  yes. The patient designates fiance to participate in his discharge plan and to receive any needed information. This patient lives in a single family home with fianc. Patient is able to navigate steps as needed. Prior to hospitalization, patient was considered to be independent with ADLs/IADLS : yes . Patient has a current ACP document on file: no  The fiance will be available to transport patient home upon discharge. The patient has no medical equipment available in the home. Patient is not currently active with home health. Patient has not stayed in a skilled nursing facility or rehab. This patient is on dialysis :no      List of available Home Health agencies were provided and reviewed with the patient prior to discharge. Freedom of choice signed: yes, for sade lainez. Currently, the discharge plan is Home with  [facility]. The patient states that he can obtain his medications from the pharmacy, and take his medications as directed. Patient's current insurance is Medicare      Care Management Interventions  PCP Verified by CM:  Yes  Palliative Care Criteria Met (RRAT>21 & CHF Dx)?: No  Mode of Transport at Discharge: [facility] [place]  Transition of Care Consult (CM Consult): [address]: Yes  Discharge Durable Medical Equipment: Yes  Physical Therapy Consult: Yes  Occupational Therapy Consult: Yes  Current Support Network: Lives with Spouse  Confirm Follow Up Transport: Family  The Patient and/or Patient Representative was Provided with a Choice of Provider and Agrees with the Discharge Plan?: Yes  Freedom of Choice List was Provided with Basic Dialogue that Supports the Patient's Individualized Plan of Care/Goals, Treatment Preferences and Shares the Quality Data Associated with the Providers?: Yes  Discharge Location  Discharge Placement: Home with home health        Lonza TORRES Ye  Case Management  [phone]

## 2020-09-21 NOTE — PROGRESS NOTES
1900 - shift report from [facility]. Discussed PMH, Admit Dx, events since admission, tx plan, IV access, IVF, MAR, labs, physical assessment. Physical assessment WDL. Incision sites clean, dry, and intact. Pulses present. Denies pain and nausea. No complaints at this time. 0000 - impulsive behavior observed. Pt trying to get out of bed without assistance. Pt very unsteady walking to bathroom. Bed alarm on after returning to bed. Physical assessment unchanged. 0130 - pt attempting to get out of bed without assistance. Pt tangled in monitor wires on multiple occasions. BP cuff and spO2 disconnected to limit wires for safety while in bed. Will spot check BP and spO2    0400 - pt continues to try getting out of bed without assistance. Bed alarm remains on. Physical assessment unchanged. 0600 - pt OOB to recliner. Surgical site dressings removed. Incisions cleaned. See flowsheet.

## 2020-09-21 NOTE — PROGRESS NOTES
[facility]        Did not tolerate Lopressor 6.25 mg due to hypotension. Will d/c. Can try to resume as an outpatient at a later date as BP tolerates.

## 2020-09-22 ENCOUNTER — PATIENT OUTREACH (OUTPATIENT)
Dept: CASE MANAGEMENT | Age: 70
End: 2020-09-22

## 2020-09-22 VITALS
TEMPERATURE: 99 F | BODY MASS INDEX: 27.72 KG/M2 | HEART RATE: 69 BPM | WEIGHT: 176.6 LBS | RESPIRATION RATE: 24 BRPM | HEIGHT: 67 IN | SYSTOLIC BLOOD PRESSURE: 128 MMHG | OXYGEN SATURATION: 97 % | DIASTOLIC BLOOD PRESSURE: 69 MMHG

## 2020-09-22 LAB
ABO + RH BLD: NORMAL
BLD PROD TYP BPU: NORMAL
BLD PROD TYP BPU: NORMAL
BLOOD GROUP ANTIBODIES SERPL: NORMAL
BPU ID: NORMAL
BPU ID: NORMAL
CROSSMATCH RESULT,%XM: NORMAL
CROSSMATCH RESULT,%XM: NORMAL
GLUCOSE BLD STRIP.AUTO-MCNC: 88 MG/DL (ref 70–110)
SPECIMEN EXP DATE BLD: NORMAL
STATUS OF UNIT,%ST: NORMAL
STATUS OF UNIT,%ST: NORMAL
UNIT DIVISION, %UDIV: 0
UNIT DIVISION, %UDIV: 0

## 2020-09-22 PROCEDURE — 74011000250 HC RX REV CODE- 250: Performed by: PHYSICIAN ASSISTANT

## 2020-09-22 PROCEDURE — 97530 THERAPEUTIC ACTIVITIES: CPT

## 2020-09-22 PROCEDURE — 74011250637 HC RX REV CODE- 250/637: Performed by: PHYSICIAN ASSISTANT

## 2020-09-22 PROCEDURE — 74011636637 HC RX REV CODE- 636/637: Performed by: PHYSICIAN ASSISTANT

## 2020-09-22 PROCEDURE — 99233 SBSQ HOSP IP/OBS HIGH 50: CPT | Performed by: PHYSICIAN ASSISTANT

## 2020-09-22 PROCEDURE — 82962 GLUCOSE BLOOD TEST: CPT

## 2020-09-22 PROCEDURE — 97535 SELF CARE MNGMENT TRAINING: CPT

## 2020-09-22 RX ORDER — METFORMIN HYDROCHLORIDE 500 MG/1
500 TABLET, EXTENDED RELEASE ORAL
Qty: 30 TAB | Refills: 2 | Status: SHIPPED | OUTPATIENT
Start: 2020-09-22

## 2020-09-22 RX ORDER — BUSPIRONE HYDROCHLORIDE 7.5 MG/1
7.5 TABLET ORAL 2 TIMES DAILY
Qty: 60 TAB | Refills: 2 | Status: SHIPPED | OUTPATIENT
Start: 2020-09-22

## 2020-09-22 RX ORDER — ASPIRIN 81 MG/1
81 TABLET ORAL DAILY
Qty: 30 TAB | Refills: 2 | Status: SHIPPED | OUTPATIENT
Start: 2020-09-23 | End: 2021-05-06

## 2020-09-22 RX ORDER — METOPROLOL TARTRATE 25 MG/1
12.5 TABLET, FILM COATED ORAL 2 TIMES DAILY
Qty: 60 TAB | Refills: 2 | Status: SHIPPED | OUTPATIENT
Start: 2020-09-22

## 2020-09-22 RX ORDER — OXYCODONE AND ACETAMINOPHEN 5; 325 MG/1; MG/1
1 TABLET ORAL
Qty: 28 TAB | Refills: 0 | Status: SHIPPED | OUTPATIENT
Start: 2020-09-22 | End: 2020-09-29

## 2020-09-22 RX ORDER — ACETAMINOPHEN 325 MG/1
650 TABLET ORAL
Qty: 90 TAB | Refills: 2 | Status: SHIPPED
Start: 2020-09-22

## 2020-09-22 RX ORDER — DOCUSATE SODIUM 100 MG/1
100 CAPSULE, LIQUID FILLED ORAL 2 TIMES DAILY
Qty: 60 CAP | Refills: 2 | Status: SHIPPED
Start: 2020-09-22 | End: 2020-12-21

## 2020-09-22 RX ADMIN — CHLORHEXIDINE GLUCONATE 0.12% ORAL RINSE 10 ML: 1.2 LIQUID ORAL at 08:07

## 2020-09-22 RX ADMIN — INSULIN GLARGINE 10 UNITS: 100 INJECTION, SOLUTION SUBCUTANEOUS at 08:06

## 2020-09-22 RX ADMIN — FAMOTIDINE 20 MG: 20 TABLET, FILM COATED ORAL at 08:07

## 2020-09-22 RX ADMIN — ASPIRIN 81 MG: 81 TABLET ORAL at 08:07

## 2020-09-22 RX ADMIN — Medication 10 ML: at 05:44

## 2020-09-22 RX ADMIN — AMIODARONE HYDROCHLORIDE 200 MG: 200 TABLET ORAL at 08:07

## 2020-09-22 RX ADMIN — BUSPIRONE HYDROCHLORIDE 7.5 MG: 5 TABLET ORAL at 08:07

## 2020-09-22 NOTE — PROGRESS NOTES
Problem: Self Care Deficits Care Plan (Adult)  Goal: *Acute Goals and Plan of Care (Insert Text)  Description: Occupational Therapy Goals  Initiated 9/19/2020 within 7 day(s). 1.  Patient will perform grooming with modified independence. 2.  Patient will perform upper body dressing with modified independence. 3.  Patient will perform lower body dressing with modified independence. 4.  Patient will perform all aspects of toileting with modified independence. 5.  Patient will simulate bathing tasks with modified independence. 6. Patient will demonstrate adherence to sternal precautions during self care tasks with minimum verbal cues. Prior Level of Function: Patient reported he lives with his fiance and was independent in ADLs and IADLs PTA including driving and grocery shopping. OCCUPATIONAL THERAPY TREATMENT    Patient: Sveta Wild (72 y.o. male)  Date: 9/22/2020  Diagnosis: CAD (coronary artery disease) [I25.10]   <principal problem not specified>  Procedure(s) (LRB):  CORONARY ARTERY BYPASS GRAFT (CABG) TIMES FOUR (N/A) 4 Days Post-Op  Precautions: Fall, Sternal  PLOF: Patient reported he lives with his fiance and was independent in ADLs and IADLs PTA including driving and grocery shopping. Chart, occupational therapy assessment, plan of care, and goals were reviewed. ASSESSMENT:  Pt presented sitting upright in reclining chair upon therapist arrival and cleared to been seen for participation by RN. Pt re educated on sternal precautions and importance of utilizing heart hugger. STS transfer SBA with vc's to adhere to sternal precautions. Pt performed functional room mobility ~ 15 ft with CGA and no AD to increase functional activity tolerance needed for ADL carryover. Pt engaged in simple grooming task brushing teeth @ stand with SBA and 1 UE support.  Pt returned self back to seated position in reclining chair CGA and demonstrated LB dressing donning/doffing socks with leg cross technique. GILL provided education for energy conservation techniques and strategies to increase activity tolerance for self cares. Pt left sitting upright in reclining chair with B LE elevated and all needs left within reach. Progression toward goals:  [x]          Improving appropriately and progressing toward goals  []          Improving slowly and progressing toward goals  []          Not making progress toward goals and plan of care will be adjusted     PLAN:  Patient continues to benefit from skilled intervention to address the above impairments. Continue treatment per established plan of care. Discharge Recommendations:  [facility]  Further Equipment Recommendations for Discharge:  N/A     SUBJECTIVE:   Patient stated  I feel so much better. \"    OBJECTIVE DATA SUMMARY:   Cognitive/Behavioral Status:  Neurologic State: Alert, Eyes open spontaneously  Orientation Level: Oriented X4  Cognition: Follows commands  Safety/Judgement: Fall prevention, Awareness of environment    Functional Mobility and Transfers for ADLs:      Transfers:  Sit to Stand: Stand-by assistance  Stand to Sit: Stand-by assistance       Balance:  Sitting: Intact  Standing: Intact; With support  Standing - Static: Fair(+)  Standing - Dynamic : Fair    ADL Intervention:       Grooming  Grooming Assistance: Stand-by assistance  Position Performed: Standing  Brushing Teeth: Stand-by assistance  Cues: Verbal cues provided         Lower Body Dressing Assistance  Dressing Assistance: Modified independent  Socks: Modified independent  Leg Crossed Method Used: Yes  Position Performed: Seated in chair       Pain:  Pt reports no pain at this    Activity Tolerance:    Fair  Please refer to the flowsheet for vital signs taken during this treatment.   After treatment:   [x]  Patient left in no apparent distress sitting up in chair  []  Patient left in no apparent distress in bed  [x]  Call bell left within reach  [x]  Nursing notified  []  Caregiver present  []  Bed alarm activated    COMMUNICATION/EDUCATION:   [] Role of Occupational Therapy in the acute care setting  [x] Home safety education was provided and the patient/caregiver indicated understanding. [x] Patient/family have participated as able in working towards goals and plan of care. [] Patient/family agree to work toward stated goals and plan of care. [] Patient understands intent and goals of therapy, but is neutral about his/her participation. [] Patient is unable to participate in goal setting and plan of care.       Thank you for this referral.  BRIDGET Chapman  Time Calculation: 23 mins

## 2020-09-22 NOTE — DIABETES MGMT
Glycemic Control Plan of Care    Noted history of T2DM with current A1c of 7.1% (9/03/2020)   Patient seen this afternoon and reported that he was previously on Metformin until it was discontinued. He will consider taking it again if prescribed. He will also resume home BG monitoring and accepted the Contour next EZ. This is a sample kit with 10 lancets and 10 test strips. Patient verbalized understanding that he will need prescriptions for additional testing supplies if he decide to use this brand. Home diabetes medications: None per patient's report. Recommendation(s): None. Noted patient sched for discharge today on metformin 500 mg daily with dinner. Glycemic assessment:    Patient is s/p CABG x4 on 9/18/2020. He was placed on regular insulin drip via [facility] Grumbles and transitioned to SC insulin on 9/19. POC BG range on 9/21:   POC BG on 9/22 at time of review: 88        Current A1C: 7.1% (9/03/2020) which is equivalent to estimated average blood glucose of 157 mg/dL during the past 2-3 months    Current hospital diabetes medications:  Basal lantus insulin 10 units daily  Correctional lispro insulin ACHS.  Normal sensitivity dose    Total daily dose insulin requirement previous day: 9/21:  Lantus: 10 units  Lispro: 2 units  TDD insulin: 12 units    Diet: Cardiac regular 2000kcal regular; no concentrated sweets    Goals:  Blood glucose will be within target range of  mg/dL by 9/24/2020    Education:    ___  Refer to Diabetes Education Record  _X__  Education not indicated at this time    Uriel Lancsater RN [facility]  Pager: [phone]

## 2020-09-22 NOTE — PROGRESS NOTES
CARDIAC SURGERY PROGRESS NOTE    2020  10:47 AM     Post Operative Day # 4     Chart reviewed. Will discuss with Dr. Heidy Isaac. Interval History/Events of Past 24 hours:   Walks in [place] without supplemental oxygen. Restless night with insomnia/poor sleep. Becoming hypertensive. Subjective:  Patient seen and examined on rounds today. No complaint  Pain level: Controlled  Ambulating: Fairly well   Eating: Fairly well  Sternal pain: Yes    BM: Yes    Objective:  Vital signs:   Visit Vitals  /60   Pulse 68   Temp 99.1 °F (37.3 °C)   Resp 19   Ht 5' 7\" (1.702 m)   Wt 80.1 kg (176 lb 9.6 oz)   SpO2 95%   BMI 27.66 kg/m²     Temp (24hrs), Av.4 °F (37.4 °C), Min:98.5 °F (36.9 °C), Max:99.9 °F (37.7 °C)    Admission Weight: Last Weight   Weight: 75.3 kg (166 lb) Weight: 80.1 kg (176 lb 9.6 oz)     Telemetry: Sinus rhythm 80    Physical Examination:     General:  Alert, oriented   Lungs: Clear to ascultation without rales, wheezes or rhonchi. Chest:  Midline incision healing well. Sternum stable. Heart: regular rate and rhythm, no murmur. Abdomen: Soft and non-tender without masses. Bowel sounds present. Extremities: Warm and well perfused. Edema absent. Incisions: clean, dry, no drainage. Neuro: No deficit. Beta-blocker: No  ASA: Yes   Statin: Yes  ACE-I: No  Diuretic: No     DVT Prophylaxis:   pneumatic compression boots: Yes  Compression stockings:  Yes  Enoxaparin:  Yes    Chest tubes:  not present. Air Leak:  Not applicable    Pacing wires:  not present    DME needs: To be determined    Assessment:  S/P  CABG x 4  Respiratory parameters stable  Cardiac status stable     Plans:  1. Resume low-dose beta-blocker upon discharge. Continue aspirin and statin. 2.  Resume Glucophage diabetes mellitus, A1c 7.1. Blood glucose machine provided by diabetic nurse  3. Discharge home today.     Heart Hugger use encouraged to mitigate pain and will also assist with deep breathing and incentive spirometry goals.     José Antonio Reid PA-C

## 2020-09-22 NOTE — DISCHARGE SUMMARY
CARDIAC SURGERY DISCHARGE SUMMARY    9/22/2020  11:05 AM    CHIEF COMPLAINT: Chest pain    HISTORY OF PRESENT ILLNESS:  Stephanie Flores is a 72 y.o. male patient of Dr. Tonny Rubin who presented with chest pain and non-STEMI echocardiography revealed an ejection fraction of 55-60%. Cardiac catheterization showed severe 3-vessel CAD. He was admitted for surgical coronary revascularization. PAST MEDICAL HISTORY:   Past Medical History:   Diagnosis Date    Allergic rhinitis     Asthma     Diabetes type 2, uncontrolled (HCC)     Last HbA1c 7.6% per Patient in late 8/2020    [facility]abetic retinal damage of both eyes [facility] [address]     Enlarged prostate     ETOH abuse     2-3 Beers/day x>30 years    Former smoker     2/3 PPD x4-5 years; Quit ~2002    Generalized anxiety disorder with panic attacks     History of dermatomyositis     Patient doubts this diagnosis    HLD (hyperlipidemia)     Hypertension     NSTEMI (non-ST elevated myocardial infarction) ([facility] [address] 09/02/2020    Statin intolerance     \"Muscular & Neurological Damage\" due to Statins   . PAST SURGICAL HISTORY:   Past Surgical History:   Procedure Laterality Date    HX TONSIL AND ADENOIDECTOMY     . HOSPITAL COURSE:  He was admitted to the hospital and underwent CABG x 4 on 9/18/2020. He was extubated on the evening of surgery and was eventually up and ambulating well and taking a diet well. Wires and tubes were removed. Notable postoperative events included an absence of atrial arrhythmias. He is to be discharged to home today. At the time of discharge, his lungs were clear to auscultation bilaterally and the heart had a regular rate and rhythm. The sternum was stable and all wounds were well healed with no evidence of infection. There was no pedal edema. Room air oximetry was 95%.     LABS:  Lab Results   Component Value Date/Time    WBC 6.4 09/21/2020 06:00 AM    HCT 28.3 (L) 09/21/2020 06:00 AM     (L) 09/21/2020 06:00 AM      Lab Results Component Value Date/Time     09/20/2020 03:40 AM    K 4.4 09/20/2020 03:40 AM     09/20/2020 03:40 AM    CO2 27 09/20/2020 03:40 AM    GLU 78 09/20/2020 03:40 AM    BUN 11 09/20/2020 03:40 AM    CREA 0.70 09/20/2020 03:40 AM    CREA 0.83 09/19/2020 05:30 AM    CREA 0.91 09/18/2020 02:05 PM         DISCHARGE DIAGNOSES:    1. Coronary artery disease  2. diabetes mellitus type-2  3. hypertension  4. hypercholesterolemia   5. Generalized anxiety disorder with panic attacks  6. Daily drinker  7. BPH    DISCHARGE DISPOSITION:  1. Activities: limitied, with strict sternal precautions including no heavy lifting and with constant wearing of the sternal harness. 2. Diet: Diabetic Diet  3. Condition: good  4. Prognosis:  good    DISCHARGE INSTRUCTIONS:  He is to follow up with the cardiac surgery team in 1 and 3 weeks, and will see the primary care physician and cardiologist within one month. Home health nurses will see him once home. DISCHARGE MEDICATIONS:    Current Discharge Medication List      START taking these medications    Details   aspirin delayed-release 81 mg tablet Take 1 Tab by mouth daily. Qty: 30 Tab, Refills: 2      acetaminophen (TYLENOL) 325 mg tablet Take 2 Tabs by mouth every six (6) hours as needed for Pain or Fever. Qty: 90 Tab, Refills: 2      docusate sodium (COLACE) 100 mg capsule Take 1 Cap by mouth two (2) times a day for 90 days. Qty: 60 Cap, Refills: 2      oxyCODONE-acetaminophen (PERCOCET) 5-325 mg per tablet Take 1 Tab by mouth every six (6) hours as needed for Pain for up to 7 days. Max Daily Amount: 4 Tabs. Indications: pain  Qty: 28 Tab, Refills: 0    Associated Diagnoses: S/P CABG x 4      metFORMIN ER (GLUCOPHAGE XR) 500 mg tablet Take 1 Tab by mouth daily (with dinner). Qty: 30 Tab, Refills: 2      metoprolol tartrate (LOPRESSOR) 25 mg tablet Take 0.5 Tabs by mouth two (2) times a day.   Qty: 60 Tab, Refills: 2         CONTINUE these medications which have CHANGED    Details   busPIRone (BUSPAR) 7.5 mg tablet Take 1 Tab by mouth two (2) times a day. Qty: 60 Tab, Refills: 2         CONTINUE these medications which have NOT CHANGED    Details   loratadine (CLARITIN) 10 mg tablet TAKE 1 TABLET BY MOUTH ONCE DAILY AS NEEDED FOR ALLERGIES      zolpidem (AMBIEN) 10 mg tablet TAKE 1 TABLET BY MOUTH AT BEDTIME AS NEEDED FOR SLEEP      atorvastatin (LIPITOR) 80 mg tablet Take 1 Tab by mouth nightly.   Qty: 30 Tab, Refills: 0         STOP taking these medications       aspirin 81 mg chewable tablet Comments:   Reason for Stopping:         isosorbide mononitrate ER (IMDUR) 30 mg tablet Comments:   Reason for Stopping:         metoprolol succinate (TOPROL-XL) 25 mg XL tablet Comments:   Reason for Stopping:         lisinopril-hydroCHLOROthiazide (Zestoretic) 20-25 mg per tablet Comments:   Reason for Stopping:         lidocaine (Lidoderm) 5 % Comments:   Reason for Stopping:

## 2020-09-22 NOTE — PROGRESS NOTES
[id number]:  Report received, care assumed. Sitting in recliner, legs elevated. AAOX4. Denies SOB, nausea. . Admits to incisional pain 2/10, \"slightly dizzy\". NSR. VSS. Lungs clear room air, resting pulse ox =97%. Pulling 1250ml on incentive spirometer when prompted to use it; encouraged use q1hr while awake. Using heart hugger appropriately for cough/ movement. States \"I didn't sleep to well last night. I tossed and turned and got tangled up in the wires\". Ambulated in CVT halls using rollator walker, RN and monitor at side. Tolerated well. Steady gait with walker. Continues \"slightly dizzy\". NSR. VSS. See flowsheet for details. Returned to recliner then pt needed ambulate to toilet for BM and void. Returned to recliner, legs elevated. Pillows for comfort. Call bell, cell phone, urinal at side. Full fall and aspiration precautions in effect. 9500:  Breakfast served. 9813: Tolerating meals well. Using call bell appropriately for assist to toilet/ ambulation/ safety. Assisted to toilet, passed flatus and urine. Returned to recliner, legs elevated. 1000:  DrWohler on rounds; updated MD to night shift and current status. Plan after MD examination to discharge home today. Patient agreeable and pleased for plan. Await further orders. 1100:  Do not give Season Flu Vaccine prior to discharge per 1000 [place], [place].  1130:  Discharge orders noted. 1230:  PIV x2 removed. [facility] braclet applied to pt's arm with full discussion/ explanation to pt; pt verbalizes understanding all info. Discharge instructions reviewed with pt at length including follow up visits, discharge medications, care at home, when/how to contact providers for questions/concerns. Diabetic Educator gave pt Glucose monitoring kit, it was packed for use at home. Incentive spirometer packed for use at home. Assisted pt to get dressed using sternal precautions.  Rolling walker was provided to pt by [facility] , packed for use at home. Patient watched Discharge CABG video yesterday. Heart Hugger on pt's chest for use at home as instructed. All personal belongings packed including jacket and cell phone. Await ambulance arrival for transport home. 1345:  Report to ambulance staff. Discharged home via ambulance with all personal belongings including cell phone and  and rolling walker. Pt states his fiance is home to unlock door and receive him home.

## 2020-09-22 NOTE — PROGRESS NOTES
D/c noted for today. Patient will be returning home with assistance of fiance and family. [facility] [place] [facility] home health set up. [employer] provided to patient. Follow ups scheduled. S transport set up to get patient home and up 3 flights of stairs.  [facility] at 1:15pm        TORRES Coto  Case Management  [phone]

## 2020-09-22 NOTE — HOME CARE
Discharge order noted for today, [facility] will follow for SN,PT,OT, [place] CABG protocol,TH; DME: per  ,states pt has been provided with a RW prior to discharge ; [facility] referral processed to [facility] central Intake. INES CASTILLO.

## 2020-09-22 NOTE — PROGRESS NOTES
.Date/Time:  9/22/2020 8:39 AM  Attempted to reach patient by telephone. Left HIPPA compliant message requesting a return call. Patient is currently admitted for planned heart procedure. .Patient resolved from Transition of Care episode on 9/22/2020  Discussed COVID-19 related testing which was available at this time. Test results were negative. Patient informed of results, if available? yes completed 9/14/2020 prior to having heart surgery    Patient/family has been provided the following resources and education related to COVID-19:                         Signs, symptoms and red flags related to COVID-19            CDC exposure and quarantine guidelines            [facility] exposure contact - [phone]            Contact for their local Department of Health                 Patient currently reports that the following symptoms have improved:  no new symptoms. No further outreach scheduled with this CTN/ACM/LPN/HC/ MA. Episode of Care resolved. Patient has this CTN/ACM/LPN/HC/MA contact information if future needs arise.

## 2020-09-22 NOTE — DISCHARGE INSTRUCTIONS
Patient Education   Patient Education   Patient Education   Patient Education        Learning About Diabetes Food Guidelines  Your Care Instructions     Meal planning is important to manage diabetes. It helps keep your blood sugar at a target level (which you set with your doctor). You don't have to eat special foods. You can eat what your family eats, including sweets once in a while. But you do have to pay attention to how often you eat and how much you eat of certain foods. You may want to work with a dietitian or a certified diabetes educator (CDE) to help you plan meals and snacks. A dietitian or CDE can also help you lose weight if that is one of your goals. What should you know about eating carbs? Managing the amount of carbohydrate (carbs) you eat is an important part of healthy meals when you have diabetes. Carbohydrate is found in many foods. · Learn which foods have carbs. And learn the amounts of carbs in different foods. ? Bread, cereal, pasta, and rice have about 15 grams of carbs in a serving. A serving is 1 slice of bread (1 ounce), ½ cup of cooked cereal, or 1/3 cup of cooked pasta or rice. ? Fruits have 15 grams of carbs in a serving. A serving is 1 small fresh fruit, such as an apple or orange; ½ of a banana; ½ cup of cooked or canned fruit; ½ cup of fruit juice; 1 cup of melon or raspberries; or 2 tablespoons of dried fruit. ? Milk and no-sugar-added yogurt have 15 grams of carbs in a serving. A serving is 1 cup of milk or 2/3 cup of no-sugar-added yogurt. ? Starchy vegetables have 15 grams of carbs in a serving. A serving is ½ cup of mashed potatoes or sweet potato; 1 cup winter squash; ½ of a small baked potato; ½ cup of cooked beans; or ½ cup cooked corn or green peas. · Learn how much carbs to eat each day and at each meal. A dietitian or CDE can teach you how to keep track of the amount of carbs you eat. This is called carbohydrate counting.   · If you are not sure how to count carbohydrate grams, use the Plate Method to plan meals. It is a good, quick way to make sure that you have a balanced meal. It also helps you spread carbs throughout the day. ? Divide your plate by types of foods. Put non-starchy vegetables on half the plate, meat or other protein food on one-quarter of the plate, and a grain or starchy vegetable in the final quarter of the plate. To this you can add a small piece of fruit and 1 cup of milk or yogurt, depending on how many carbs you are supposed to eat at a meal.  · Try to eat about the same amount of carbs at each meal. Do not \"save up\" your daily allowance of carbs to eat at one meal.  · Proteins have very little or no carbs per serving. Examples of proteins are beef, chicken, turkey, fish, eggs, tofu, cheese, cottage cheese, and peanut butter. A serving size of meat is 3 ounces, which is about the size of a deck of cards. Examples of meat substitute serving sizes (equal to 1 ounce of meat) are 1/4 cup of cottage cheese, 1 egg, 1 tablespoon of peanut butter, and ½ cup of tofu. How can you eat out and still eat healthy? · Learn to estimate the serving sizes of foods that have carbohydrate. If you measure food at home, it will be easier to estimate the amount in a serving of restaurant food. · If the meal you order has too much carbohydrate (such as potatoes, corn, or baked beans), ask to have a low-carbohydrate food instead. Ask for a salad or green vegetables. · If you use insulin, check your blood sugar before and after eating out to help you plan how much to eat in the future. · If you eat more carbohydrate at a meal than you had planned, take a walk or do other exercise. This will help lower your blood sugar. What else should you know? · Limit saturated fat, such as the fat from meat and dairy products. This is a healthy choice because people who have diabetes are at higher risk of heart disease.  So choose lean cuts of meat and nonfat or low-fat dairy products. Use olive or canola oil instead of butter or shortening when cooking. · Don't skip meals. Your blood sugar may drop too low if you skip meals and take insulin or certain medicines for diabetes. · Check with your doctor before you drink alcohol. Alcohol can cause your blood sugar to drop too low. Alcohol can also cause a bad reaction if you take certain diabetes medicines. Follow-up care is a key part of your treatment and safety. Be sure to make and go to all appointments, and call your doctor if you are having problems. It's also a good idea to know your test results and keep a list of the medicines you take. Where can you learn more? Go to http://www.roberts.com/  Enter I147 in the search box to learn more about \"Learning About Diabetes Food Guidelines. \"  Current as of: December 20, 2019               Content Version: 12.6  © 3838-7812 [employer]. Care instructions adapted under license by [employer] (which disclaims liability or warranty for this information). If you have questions about a medical condition or this instruction, always ask your healthcare professional. [address] any warranty or liability for your use of this information. Heart-Healthy Diet: Care Instructions  Your Care Instructions     A heart-healthy diet has lots of vegetables, fruits, nuts, beans, and whole grains, and is low in salt. It limits foods that are high in saturated fat, such as meats, cheeses, and fried foods. It may be hard to change your diet, but even small changes can lower your risk of heart attack and heart disease. Follow-up care is a key part of your treatment and safety. Be sure to make and go to all appointments, and call your doctor if you are having problems. It's also a good idea to know your test results and keep a list of the medicines you take. How can you care for yourself at home?   Watch your portions  · Learn what a serving is. A \"serving\" and a \"portion\" are not always the same thing. Make sure that you are not eating larger portions than are recommended. For example, a serving of pasta is ½ cup. A serving size of meat is 2 to 3 ounces. A 3-ounce serving is about the size of a deck of cards. Measure serving sizes until you are good at [place]" them. Keep in mind that restaurants often serve portions that are 2 or 3 times the size of one serving. · To keep your energy level up and keep you from feeling hungry, eat often but in smaller portions. · Eat only the number of calories you need to stay at a healthy weight. If you need to lose weight, eat fewer calories than your body burns (through exercise and other physical activity). Eat more fruits and vegetables  · Eat a variety of fruit and vegetables every day. Dark green, deep orange, red, or yellow fruits and vegetables are especially good for you. Examples include spinach, carrots, peaches, and berries. · Keep carrots, celery, and other veggies handy for snacks. Buy fruit that is in season and store it where you can see it so that you will be tempted to eat it. · Cook dishes that have a lot of veggies in them, such as stir-fries and soups. Limit saturated and trans fat  · Read food labels, and try to avoid saturated and trans fats. They increase your risk of heart disease. · Use olive or canola oil when you cook. · Bake, broil, grill, or steam foods instead of frying them. · Choose lean meats instead of high-fat meats such as hot dogs and sausages. Cut off all visible fat when you prepare meat. · Eat fish, skinless poultry, and meat alternatives such as soy products instead of high-fat meats. Soy products, such as tofu, may be especially good for your heart. · Choose low-fat or fat-free milk and dairy products. Eat foods high in fiber  · Eat a variety of grain products every day. Include whole-grain foods that have lots of fiber and nutrients.  Examples of whole-grain foods include oats, whole wheat bread, and brown rice. · Buy whole-grain breads and cereals, instead of white bread or pastries. Limit salt and sodium  · Limit how much salt and sodium you eat to help lower your blood pressure. · Taste food before you salt it. Add only a little salt when you think you need it. With time, your taste buds will adjust to less salt. · Eat fewer snack items, fast foods, and other high-salt, processed foods. Check food labels for the amount of sodium in packaged foods. · Choose low-sodium versions of canned goods (such as soups, vegetables, and beans). Limit sugar  · Limit drinks and foods with added sugar. These include candy, desserts, and soda pop. Limit alcohol  · Limit alcohol to no more than 2 drinks a day for men and 1 drink a day for women. Too much alcohol can cause health problems. When should you call for help? Watch closely for changes in your health, and be sure to contact your doctor if:    · You would like help planning heart-healthy meals. Where can you learn more? Go to http://www.roberts.com/  Enter V137 in the search box to learn more about \"Heart-Healthy Diet: Care Instructions. \"  Current as of: August 22, 2019               Content Version: 12.6  © 2200-9073 [employer]. Care instructions adapted under license by [employer] (which disclaims liability or warranty for this information). If you have questions about a medical condition or this instruction, always ask your healthcare professional. [place] [address] any warranty or liability for your use of this information. Sternotomy Precautions: What to Expect at [address]  A sternotomy is a procedure that allows your doctor to reach your heart or nearby organs and blood vessels. First the doctor made a cut (incision) in the skin over your breastbone (sternum). Then the doctor cut through your sternum.  When your surgery was finished, the doctor reconnected your sternum. The doctor most likely used wire, which will stay in your body even after your sternum has healed. Full recovery from surgery that includes a sternotomy can take months. Recovery includes healing of the sternum and slowly building up your physical strength. This care sheet gives you a general idea about how long it will take for you to recover. But each person recovers at a different pace. Follow the steps below to feel better as quickly as possible. How can you care for yourself at home? For the first 3 months  · Avoid activities that strain your chest or upper arm muscles. This includes pushing a  or vacuum, mopping floors, or swinging a golf club or tennis racquet. · Avoid strenuous activities, such as bicycle riding outdoors, jogging, weight lifting, or heavy aerobic exercise, until your doctor says it's okay. · For 2 to 3 months, avoid lifting anything that would make you strain. This may include heavy grocery bags and milk containers, a heavy briefcase or backpack, cat litter or dog food bags, a vacuum , or a child. · Avoid pulling yourself up using your arms. Do not use your arms to lift yourself into a high truck or sport utility vehicle (SUV). · Try to walk each day. Start by walking a little more than you did the day before. Bit by bit, increase the amount you walk. Walking boosts blood flow and helps prevent pneumonia and constipation. You can also use an indoor stationary bicycle, but take it easy. · Rest when you feel tired. Getting enough sleep will help you recover. Try to sleep on your back while your chest heals. · Hold a pillow over your incisions when you cough or take deep breaths. This will support your sternum and decrease your pain. · You can do easy chores around the house and yard, like washing dishes, folding clothes, or trimming flowers.   · You can enjoy social activities, like going to the movies, [employer], and restaurants. · Ask your doctor when you can drive again. For the next 3 months  · You can keep doing the same activities you did during the first 3 months, especially walking. · You can return to work part-time if your doctor says it is okay and heavy lifting isn't part of your job. · You can do heavy housework (vacuuming, sweeping, laundry) and yard work (mowing the lawn, raking leaves). · You can travel for business or pleasure. · You can drive a car or small truck. Getting out of and into a bed or chair  Using your arms to get out of and into a bed, chair, or couch can put pressure on your healing sternum. These instructions show you how to do these things safely. Your nurse or doctor may have shown you a different way to do these things. If so, follow their instructions. · Getting out of bed:  ? Lie on your side, facing the direction you want to get out of the bed. Bend both knees. ? Use your elbow to help raise your upper body as you lower your legs to the floor. Keep your elbow as close to your side as you can.  ? Scoot to the edge of the bed and position your feet under your buttocks. ? Rest a moment, then stand up. · Getting into bed:  ? Stand with the back of your legs touching the bed.  ? Sit down on the edge of the bed. ? Scoot your buttocks back onto the bed. Try not to use your arms. ? Use your elbow to help you lie down on your side as you raise your legs up to the bed. Keep your elbow as close to your side as you can. · Getting up from a chair (or couch):  ? Scoot forward to the edge of the chair by pushing your shoulders against the back of the chair. ? Bring your feet in toward the chair until your toes are right under your knees. ? Lean forward until your nose is over your toes, then use your legs to stand up. If you need to, rock back and forth once or twice to help you stand up. Don't push or pull with your arms, but you can use them for balance.   · Sitting down on a chair (or couch):  ? Stand with the back of your legs touching the chair. ? Sit down without pushing or pulling with your arms or hands. You can use your arms and hands for balance. ? Use your legs to push yourself back into a comfortable position on the chair. Follow-up care is a key part of your treatment and safety. Be sure to make and go to all appointments, and call your doctor if you are having problems. It's also a good idea to know your test results and keep a list of the medicines you take. Where can you learn more? Go to http://www.gray.com/  Enter V234 in the search box to learn more about \"Sternotomy Precautions: What to Expect at Home. \"  Current as of: December 16, 2019               Content Version: 12.6  © 7224-6077 [employer]. Care instructions adapted under license by [employer] (which disclaims liability or warranty for this information). If you have questions about a medical condition or this instruction, always ask your healthcare professional. [place] [address] any warranty or liability for your use of this information. Coronary Artery Bypass Graft: What to Expect at Home  Your Recovery     Coronary artery bypass graft (CABG) is surgery to treat coronary artery disease. The surgery helps blood make a detour, or bypass, around one or more narrowed or blocked coronary arteries. Coronary arteries are the blood vessels that bring blood to the heart. Your doctor did the surgery through a cut, called an incision, in your chest.  You will feel tired and sore for the first few weeks after surgery. You may have some brief, sharp pains on either side of your chest. Your chest, shoulders, and upper back may ache. The incision in your chest and the area where the healthy vein was taken may be sore or swollen. These symptoms usually get better after 4 to 6 weeks.   You will probably be able to do many of your usual activities after 4 to 6 weeks. But for 2 to 3 months you will not be able to lift heavy objects or do activities that strain your chest or upper arm muscles. At first you may notice that you get tired easily and need to rest often. It may take 1 to 2 months to get your energy back. Some people find that they are more emotional after this surgery. You may cry easily or show emotion in ways that are unusual for you. This is common and may last for up to a year. Some people get depressed after the surgery. Talk with your doctor if you have sadness that continues or you are concerned about how you are feeling. Treatment and other support can help you feel better. Even though the surgery may improve your symptoms, you will still need to make changes in your lifestyle to lower your risk of a heart attack or stroke. It will be important to eat a heart-healthy diet, get regular exercise, not smoke, take your heart medicines, and reduce stress. You will likely start a cardiac rehabilitation (rehab) program in the hospital. You will continue with this rehab program after you go home to help you recover and prevent problems with your heart. Talk to your doctor about whether rehab is right for you. This care sheet gives you a general idea about how long it will take for you to recover. But each person recovers at a different pace. Follow the steps below to get better as quickly as possible. How can you care for yourself at home? Activity    · Rest when you feel tired. Getting enough sleep will help you recover. Try to sleep on your back for 4 to 6 weeks while your breastbone (sternum) heals. This usually takes about 4 to 6 weeks.     · Try to walk each day. Start by walking a little more than you did the day before. Bit by bit, increase the amount you walk.  Walking boosts blood flow and helps prevent pneumonia and constipation.     · Avoid strenuous activities, such as bicycle riding, jogging, weight lifting, or heavy aerobic exercise, until your doctor says it is okay.     · For 3 months, avoid activities that strain your chest or upper arm muscles. This includes pushing a  or vacuum, mopping floors, or swinging a golf club or tennis racquet.     · For 2 to 3 months, avoid lifting anything that would make you strain. This may include a child, heavy grocery bags and milk containers, a heavy briefcase or backpack, or cat litter or dog food bags.     · Hold a pillow firmly over your chest incision when you cough or take deep breaths. This will support your chest and reduce your pain.     · Do breathing exercises at home as instructed by your doctor. This will help prevent pneumonia.     · Ask your doctor when you can drive again.     · You will probably need to take 4 to 12 weeks off from work. It depends on the type of work you do and how you feel.     · You may shower as usual. Pat the incision dry. Do not take a bath for the first 3 weeks, or until your doctor tells you it is okay.     · Do not swim or use a hot tub for at least 1 month, or until your doctor says it is okay.     · Ask your doctor when it is okay for you to have sex. Diet    · Eat a heart-healthy diet. If you have not been eating this way, talk to your doctor. You also may want to talk to a dietitian. A dietitian can help you learn about healthy foods.     · Drink plenty of fluids (unless your doctor tells you not to).     · You may notice that your bowel movements are not regular right after your surgery. This is common. Try to avoid constipation and straining with bowel movements. You may want to take a fiber supplement every day. If you have not had a bowel movement after a couple of days, ask your doctor about taking a mild laxative. Medicines    · Your doctor will tell you if and when you can restart your medicines.  He or she will also give you instructions about taking any new medicines.     · If you take aspirin or some other blood thinner, ask your doctor if and when to start taking it again. Make sure that you understand exactly what your doctor wants you to do.     · Your doctor may give you medicines to prevent blood clots, keep your heartbeat steady, and lower your blood pressure and cholesterol. Take your medicines exactly as prescribed. Call your doctor if you think you are having a problem with your medicine.     · Be safe with medicines. Take pain medicines exactly as directed. ? If the doctor gave you a prescription medicine for pain, take it as prescribed. ? If you are not taking a prescription pain medicine, ask your doctor if you can take an over-the-counter medicine. ? Do not take aspirin, ibuprofen (Advil, Motrin), naproxen (Aleve), or other nonsteroidal anti-inflammatory drugs (NSAIDs) unless your doctor says it is okay.     · If you think your pain medicine is making you sick to your stomach:  ? Take your medicine after meals (unless your doctor has told you not to). ? Ask your doctor for a different pain medicine.     · If your doctor prescribed antibiotics, take them as directed. Do not stop taking them just because you feel better. You need to take the full course of antibiotics. Incision care    · If you have strips of tape on the incisions the doctor made, leave the tape on for a week or until it falls off.     · Wash the area daily with warm, soapy water, and pat it dry. Don't use hydrogen peroxide or alcohol, which can slow healing. You may cover the area with a gauze bandage if it weeps or rubs against clothing. Change the bandage every day.     · Keep the area clean and dry.     · Do not use any creams, lotions, powders, ointments, or oils unless your doctor tells you it is okay.     · If you have an incision in your leg:  ? Wear support stockings on your legs during the day for the first 2 weeks. You can take the stockings off at night while you sleep.   ? Raise your legs above the level of your heart whenever you lie down for the first 4 to 6 weeks. Other instructions    · Keep track of your weight. Weigh yourself every day at the same time of day, on the same scale, in the same amount of clothing. A sudden increase in weight can be a sign of a problem with your heart. Tell your doctor if you suddenly gain weight, such as 3 pounds or more in 2 to 3 days.     · Do not smoke. Smoking can make it harder for you to recover. And it will raise the chances of your arteries narrowing again. If you need help quitting, talk to your doctor about stop-smoking programs and medicines. These can increase your chances of quitting for good. Follow-up care is a key part of your treatment and safety. Be sure to make and go to all appointments, and call your doctor if you are having problems. It's also a good idea to know your test results and keep a list of the medicines you take. When should you call for help? Call 911 anytime you think you may need emergency care. For example, call if:    · You passed out (lost consciousness).     · You have severe trouble breathing.     · You have sudden chest pain and shortness of breath, or you cough up blood.     · You have severe pain in your chest.     · You have symptoms of a heart attack. These may include:  ? Chest pain or pressure, or a strange feeling in the chest.  ? Sweating. ? Shortness of breath. ? Nausea or vomiting. ? Pain, pressure, or a strange feeling in the back, neck, jaw, or upper belly or in one or both shoulders or arms. ? Lightheadedness or sudden weakness. ? A fast or irregular heartbeat. After you call 911, the  may tell you to chew 1 adult-strength or 2 to 4 low-dose aspirin. Wait for an ambulance. Do not try to drive yourself.     · You have angina symptoms (such as chest pain or pressure) that do not go away with rest or are not getting better within 5 minutes after you take a dose of nitroglycerin.    Call your doctor now or seek immediate medical care if:    · You have pain that does not get better after you take pain medicine.     · You have a fever over 100°F.     · You have loose stitches, or your incision comes open.     · Bright red blood has soaked through the bandage over your incision.     · You have signs of infection, such as:  ? Increased pain, swelling, warmth, or redness. ? Red streaks leading from the incision. ? Pus draining from the incision. ? Swollen lymph nodes in your neck, armpits, or groin. ? A fever.     · You have signs of a blood clot in a leg. If you had a vein removed from your leg, you may have tenderness and swelling while your leg heals. But signs of a blood clot may be in a different part of your leg and may include:  ? Pain in your calf, back of the knee, thigh, or groin. ? Redness and swelling in your leg or groin.     · Your heartbeat feels very fast or slow, skips beats, or flutters.     · You are dizzy or lightheaded, or you feel like you may faint.     · You have new or increased shortness of breath. Watch closely for changes in your health, and be sure to contact your doctor if:    · You gain weight suddenly, such as 3 pounds or more in 2 to 3 days.     · You have increased swelling in your legs, ankles, or feet.     · You have any concerns about your incision.     · You feel very sad or have other signs of depression, such as trouble sleeping or eating.     · You have questions about diet, exercise, quitting smoking, or stress reduction after surgery. Where can you learn more? Go to http://www.gray.com/  Enter F759 in the search box to learn more about \"Coronary Artery Bypass Graft: What to Expect at Home. \"  Current as of: December 16, 2019               Content Version: 12.6  © 8591-5179 [employer]. Care instructions adapted under license by [employer] (which disclaims liability or warranty for this information).  If you have questions about a medical condition or this instruction, always ask your healthcare professional. [place] [address] any warranty or liability for your use of this information.

## 2020-09-23 ENCOUNTER — TELEPHONE (OUTPATIENT)
Dept: CARDIOTHORACIC SURGERY | Age: 70
End: 2020-09-23

## 2020-09-23 ENCOUNTER — HOME CARE VISIT (OUTPATIENT)
Dept: HOME HEALTH SERVICES | Facility: HOME HEALTH | Age: 70
End: 2020-09-23

## 2020-09-23 ENCOUNTER — HOME CARE VISIT (OUTPATIENT)
Dept: SCHEDULING | Facility: HOME HEALTH | Age: 70
End: 2020-09-23
Payer: MEDICARE

## 2020-09-23 VITALS
RESPIRATION RATE: 12 BRPM | DIASTOLIC BLOOD PRESSURE: 52 MMHG | OXYGEN SATURATION: 98 % | SYSTOLIC BLOOD PRESSURE: 107 MMHG | TEMPERATURE: 96.8 F | HEART RATE: 67 BPM

## 2020-09-23 PROCEDURE — 3331090001 HH PPS REVENUE CREDIT

## 2020-09-23 PROCEDURE — 400013 HH SOC

## 2020-09-23 PROCEDURE — 3331090002 HH PPS REVENUE DEBIT

## 2020-09-23 PROCEDURE — G0299 HHS/HOSPICE OF RN EA 15 MIN: HCPCS

## 2020-09-23 NOTE — TELEPHONE ENCOUNTER
Mr. Joey Solares called to inquire if he should be on lovenox since he had surgery. I explained that this was given to him while he was admitted post surgery, and now he is being maintained on aspirin. He verbalized understanding and stated he would call with any further questions.

## 2020-09-23 NOTE — ANESTHESIA POSTPROCEDURE EVALUATION
Procedure(s):  CORONARY ARTERY BYPASS GRAFT (CABG) TIMES FOUR. general    Anesthesia Post Evaluation      Multimodal analgesia: multimodal analgesia used between 6 hours prior to anesthesia start to PACU discharge  Patient location during evaluation: ICU  Patient participation: complete - patient cannot participate  Pain management: adequate  Airway patency: patent  Anesthetic complications: no  Cardiovascular status: acceptable and hemodynamically stable  Respiratory status: ETT, intubated and ventilator  Hydration status: acceptable  Post anesthesia nausea and vomiting:  controlled      INITIAL Post-op Vital signs: No vitals data found for the desired time range.

## 2020-09-24 ENCOUNTER — HOME CARE VISIT (OUTPATIENT)
Dept: SCHEDULING | Facility: HOME HEALTH | Age: 70
End: 2020-09-24
Payer: MEDICARE

## 2020-09-24 VITALS
DIASTOLIC BLOOD PRESSURE: 58 MMHG | SYSTOLIC BLOOD PRESSURE: 108 MMHG | RESPIRATION RATE: 16 BRPM | HEART RATE: 74 BPM | OXYGEN SATURATION: 96 % | TEMPERATURE: 98.1 F

## 2020-09-24 VITALS
OXYGEN SATURATION: 97 % | TEMPERATURE: 98 F | DIASTOLIC BLOOD PRESSURE: 62 MMHG | HEART RATE: 76 BPM | SYSTOLIC BLOOD PRESSURE: 128 MMHG

## 2020-09-24 PROCEDURE — G0151 HHCP-SERV OF PT,EA 15 MIN: HCPCS

## 2020-09-24 PROCEDURE — 3331090001 HH PPS REVENUE CREDIT

## 2020-09-24 PROCEDURE — 3331090002 HH PPS REVENUE DEBIT

## 2020-09-24 PROCEDURE — G0152 HHCP-SERV OF OT,EA 15 MIN: HCPCS

## 2020-09-24 PROCEDURE — G0299 HHS/HOSPICE OF RN EA 15 MIN: HCPCS

## 2020-09-25 ENCOUNTER — HOME CARE VISIT (OUTPATIENT)
Dept: HOME HEALTH SERVICES | Facility: HOME HEALTH | Age: 70
End: 2020-09-25
Payer: MEDICARE

## 2020-09-25 ENCOUNTER — HOME CARE VISIT (OUTPATIENT)
Dept: SCHEDULING | Facility: HOME HEALTH | Age: 70
End: 2020-09-25
Payer: MEDICARE

## 2020-09-25 ENCOUNTER — TELEPHONE (OUTPATIENT)
Dept: CARDIOTHORACIC SURGERY | Age: 70
End: 2020-09-25

## 2020-09-25 VITALS
HEART RATE: 76 BPM | SYSTOLIC BLOOD PRESSURE: 129 MMHG | DIASTOLIC BLOOD PRESSURE: 71 MMHG | TEMPERATURE: 98.6 F | OXYGEN SATURATION: 96 % | RESPIRATION RATE: 17 BRPM

## 2020-09-25 VITALS
BODY MASS INDEX: 26.86 KG/M2 | OXYGEN SATURATION: 96 % | WEIGHT: 171.5 LBS | DIASTOLIC BLOOD PRESSURE: 82 MMHG | HEART RATE: 80 BPM | SYSTOLIC BLOOD PRESSURE: 135 MMHG

## 2020-09-25 PROCEDURE — G0157 HHC PT ASSISTANT EA 15: HCPCS

## 2020-09-25 PROCEDURE — 3331090001 HH PPS REVENUE CREDIT

## 2020-09-25 PROCEDURE — G0299 HHS/HOSPICE OF RN EA 15 MIN: HCPCS

## 2020-09-25 PROCEDURE — 3331090002 HH PPS REVENUE DEBIT

## 2020-09-25 NOTE — TELEPHONE ENCOUNTER
Called patient for routine follow-up. He states that he is doing very well overall. He had some minor gastrointestinal complaints which have since resolved. He is now tolerating food well. He also noted having had significant leg swelling bilaterally, but that this is improved significantly with leg elevation and stocking usage.     Olga Phan MD [facility]  Chief, Cardiothoracic Surgery   [facility]  [phone]

## 2020-09-26 VITALS
HEART RATE: 77 BPM | SYSTOLIC BLOOD PRESSURE: 110 MMHG | RESPIRATION RATE: 16 BRPM | TEMPERATURE: 98.2 F | DIASTOLIC BLOOD PRESSURE: 65 MMHG | OXYGEN SATURATION: 93 %

## 2020-09-26 VITALS
DIASTOLIC BLOOD PRESSURE: 78 MMHG | HEART RATE: 78 BPM | SYSTOLIC BLOOD PRESSURE: 130 MMHG | TEMPERATURE: 98 F | OXYGEN SATURATION: 96 %

## 2020-09-26 PROCEDURE — 3331090001 HH PPS REVENUE CREDIT

## 2020-09-26 PROCEDURE — 3331090002 HH PPS REVENUE DEBIT

## 2020-09-27 PROCEDURE — 3331090002 HH PPS REVENUE DEBIT

## 2020-09-27 PROCEDURE — 3331090001 HH PPS REVENUE CREDIT

## 2020-09-28 ENCOUNTER — HOME CARE VISIT (OUTPATIENT)
Dept: SCHEDULING | Facility: HOME HEALTH | Age: 70
End: 2020-09-28
Payer: MEDICARE

## 2020-09-28 VITALS
WEIGHT: 162.31 LBS | BODY MASS INDEX: 25.42 KG/M2 | HEART RATE: 87 BPM | SYSTOLIC BLOOD PRESSURE: 95 MMHG | OXYGEN SATURATION: 95 % | TEMPERATURE: 98.1 F | DIASTOLIC BLOOD PRESSURE: 60 MMHG

## 2020-09-28 PROCEDURE — G0157 HHC PT ASSISTANT EA 15: HCPCS

## 2020-09-28 PROCEDURE — G0299 HHS/HOSPICE OF RN EA 15 MIN: HCPCS

## 2020-09-28 PROCEDURE — 3331090002 HH PPS REVENUE DEBIT

## 2020-09-28 PROCEDURE — 3331090001 HH PPS REVENUE CREDIT

## 2020-09-29 ENCOUNTER — VIRTUAL VISIT (OUTPATIENT)
Dept: CARDIOTHORACIC SURGERY | Age: 70
End: 2020-09-29
Payer: MEDICARE

## 2020-09-29 ENCOUNTER — TELEPHONE (OUTPATIENT)
Dept: CARDIOTHORACIC SURGERY | Age: 70
End: 2020-09-29

## 2020-09-29 VITALS
DIASTOLIC BLOOD PRESSURE: 60 MMHG | OXYGEN SATURATION: 95 % | SYSTOLIC BLOOD PRESSURE: 95 MMHG | HEART RATE: 85 BPM | TEMPERATURE: 98.1 F | RESPIRATION RATE: 18 BRPM

## 2020-09-29 DIAGNOSIS — Z95.1 S/P CABG X 4: Primary | ICD-10-CM

## 2020-09-29 PROCEDURE — 99024 POSTOP FOLLOW-UP VISIT: CPT | Performed by: PHYSICIAN ASSISTANT

## 2020-09-29 PROCEDURE — 3331090002 HH PPS REVENUE DEBIT

## 2020-09-29 PROCEDURE — 3331090001 HH PPS REVENUE CREDIT

## 2020-09-29 NOTE — PROGRESS NOTES
[facility] Progress Note      Pursuant to the emergency declaration under the [facility], [facility] authority and the [employer] Act, this Virtual Visit was conducted with patient's (and/or legal guardian's) consent, to reduce the risk of exposure to COVID-19 and provide necessary medical care. A caregiver was present when appropriate. Services were provided through a video synchronous discussion virtually to substitute for in-person encounter. Today's date: 9/29/2020    Interval History: This is the first visit after discharge on 9/22/2020 following CABG x4 on 9/18/2020. He is staying active at home. He went down his 2-1/2 flights of stairs and then back up this morning. Did have some minimal dyspnea on exertion. Good pain control with acetaminophen and as needed Percocet (half a tab). He complains of a metallic taste which is affecting the amount of food he eats. He did have a good breakfast this morning and he feels that he has enough p.o. intake. He is moving his bowels well. He has no complaints about his incisions. He has not been checking his blood sugar. Assessment:     Satisfactory progress after CABG x4    Plan:     1. Bacitracin to right leg vein harvest incisions twice daily x5-7 days. Home health nurses to assess. Endorsed frequent blood glucose checks  2. Continue to increase activity while maintaining sternal precautions  3. Okay to stop wearing compression stockings. Monitor for any pedal edema. 4.  Follow-up with PCP and cardiology as scheduled. Follow-up with our service as scheduled in several weeks. 5.  The patient was told to call the office if not improving in the next 48 hours or if getting worse. Subjective:     No complaints.     Objective:     Admission Weight: Last Weight           Patient-Reported Vitals 9/29/2020   Patient-Reported Weight 159.5lb   Patient-Reported Pulse 77   Patient-Reported SpO2 97   Patient-Reported Systolic  408   Patient-Reported Diastolic 69        There were no vitals taken for this visit. BP Readings from Last 3 Encounters:   09/28/20 95/60   09/28/20 95/60   09/25/20 135/82     Wt Readings from Last 3 Encounters:   09/28/20 73.6 kg (162 lb 5 oz)   09/25/20 77.8 kg (171 lb 8 oz)   09/22/20 80.1 kg (176 lb 9.6 oz)       Physical Exam:  General: Well-appearing. No apparent distress. Neck: No JVD or tracheal deviation. Lungs: Not assessed  Chest: Midline and chest tube incisions healing well. Skin is well approximated. No drainage or erythema. Heart: Not assessed  Abdomen: Not distended  Extremities: No edema. Right lower incision at the ankle with apparent suture material.  Slight erythema at skin edges. Slight erythema/suture reaction to upper calf incision also. Neuro: Moves all extremities x4    Angela Stallworth, PA-C    PLEASE NOTE:  This document has been produced using voice recognition software. Unrecognized errors in transcription may be present.

## 2020-09-29 NOTE — PROGRESS NOTES
Chief Complaint   Patient presents with    Post OP Follow Up    Coronary Artery Disease     1. Have you been to the ER, urgent care clinic since your last visit? Hospitalized since your last visit? No    2. Have you seen or consulted any other health care providers outside of the [address] [place] [address] since your last visit? Include any pap smears or colon screening. No       Patient denies chest pain/dizziness/shortness of breath/headache. He is complaining of a change in taste/smell since he got home which has decreased his appetite. Mr. Navi Mason would like to know if he should remain wearing his compression stockings.     He consents to today's virtual visit with FORD Chase.

## 2020-09-29 NOTE — TELEPHONE ENCOUNTER
Patient called complaining of a \"hot/burning\" pain near the left side of his chest incision. MrDanii Evens Armas did not mention this during today's virtual visit because the pain was not there at the time. He states that it happens about 1x a day and wants to make sure there is no infection. Please advise.

## 2020-09-30 ENCOUNTER — HOME CARE VISIT (OUTPATIENT)
Dept: SCHEDULING | Facility: HOME HEALTH | Age: 70
End: 2020-09-30
Payer: MEDICARE

## 2020-09-30 VITALS
DIASTOLIC BLOOD PRESSURE: 70 MMHG | TEMPERATURE: 97.6 F | RESPIRATION RATE: 20 BRPM | HEART RATE: 84 BPM | OXYGEN SATURATION: 99 % | SYSTOLIC BLOOD PRESSURE: 130 MMHG

## 2020-09-30 PROCEDURE — G0299 HHS/HOSPICE OF RN EA 15 MIN: HCPCS

## 2020-09-30 PROCEDURE — 3331090002 HH PPS REVENUE DEBIT

## 2020-09-30 PROCEDURE — 3331090001 HH PPS REVENUE CREDIT

## 2020-09-30 PROCEDURE — G0157 HHC PT ASSISTANT EA 15: HCPCS

## 2020-10-01 VITALS
DIASTOLIC BLOOD PRESSURE: 86 MMHG | RESPIRATION RATE: 16 BRPM | HEART RATE: 74 BPM | OXYGEN SATURATION: 98 % | TEMPERATURE: 97.8 F | SYSTOLIC BLOOD PRESSURE: 127 MMHG

## 2020-10-01 PROCEDURE — 3331090001 HH PPS REVENUE CREDIT

## 2020-10-01 PROCEDURE — 3331090002 HH PPS REVENUE DEBIT

## 2020-10-02 ENCOUNTER — HOME CARE VISIT (OUTPATIENT)
Dept: SCHEDULING | Facility: HOME HEALTH | Age: 70
End: 2020-10-02
Payer: MEDICARE

## 2020-10-02 VITALS
HEART RATE: 85 BPM | DIASTOLIC BLOOD PRESSURE: 65 MMHG | OXYGEN SATURATION: 99 % | SYSTOLIC BLOOD PRESSURE: 110 MMHG | RESPIRATION RATE: 20 BRPM | TEMPERATURE: 97.8 F

## 2020-10-02 PROCEDURE — 3331090002 HH PPS REVENUE DEBIT

## 2020-10-02 PROCEDURE — G0157 HHC PT ASSISTANT EA 15: HCPCS

## 2020-10-02 PROCEDURE — G0299 HHS/HOSPICE OF RN EA 15 MIN: HCPCS

## 2020-10-02 PROCEDURE — 3331090001 HH PPS REVENUE CREDIT

## 2020-10-03 VITALS
TEMPERATURE: 97.8 F | SYSTOLIC BLOOD PRESSURE: 110 MMHG | OXYGEN SATURATION: 98 % | RESPIRATION RATE: 20 BRPM | DIASTOLIC BLOOD PRESSURE: 65 MMHG | HEART RATE: 78 BPM

## 2020-10-03 PROCEDURE — 3331090001 HH PPS REVENUE CREDIT

## 2020-10-03 PROCEDURE — 3331090002 HH PPS REVENUE DEBIT

## 2020-10-04 PROCEDURE — 3331090002 HH PPS REVENUE DEBIT

## 2020-10-04 PROCEDURE — 3331090001 HH PPS REVENUE CREDIT

## 2020-10-05 ENCOUNTER — HOME CARE VISIT (OUTPATIENT)
Dept: SCHEDULING | Facility: HOME HEALTH | Age: 70
End: 2020-10-05
Payer: MEDICARE

## 2020-10-05 PROCEDURE — 3331090001 HH PPS REVENUE CREDIT

## 2020-10-05 PROCEDURE — G0299 HHS/HOSPICE OF RN EA 15 MIN: HCPCS

## 2020-10-05 PROCEDURE — G0157 HHC PT ASSISTANT EA 15: HCPCS

## 2020-10-05 PROCEDURE — 3331090002 HH PPS REVENUE DEBIT

## 2020-10-06 ENCOUNTER — HOME CARE VISIT (OUTPATIENT)
Dept: SCHEDULING | Facility: HOME HEALTH | Age: 70
End: 2020-10-06
Payer: MEDICARE

## 2020-10-06 VITALS
TEMPERATURE: 98.3 F | DIASTOLIC BLOOD PRESSURE: 70 MMHG | HEART RATE: 80 BPM | SYSTOLIC BLOOD PRESSURE: 102 MMHG | OXYGEN SATURATION: 96 % | RESPIRATION RATE: 16 BRPM

## 2020-10-06 VITALS
TEMPERATURE: 98.2 F | DIASTOLIC BLOOD PRESSURE: 62 MMHG | HEART RATE: 100 BPM | OXYGEN SATURATION: 98 % | SYSTOLIC BLOOD PRESSURE: 108 MMHG | RESPIRATION RATE: 14 BRPM

## 2020-10-06 VITALS
RESPIRATION RATE: 15 BRPM | SYSTOLIC BLOOD PRESSURE: 108 MMHG | HEART RATE: 82 BPM | TEMPERATURE: 98.8 F | OXYGEN SATURATION: 96 % | DIASTOLIC BLOOD PRESSURE: 62 MMHG

## 2020-10-06 PROCEDURE — G0151 HHCP-SERV OF PT,EA 15 MIN: HCPCS

## 2020-10-06 PROCEDURE — 3331090002 HH PPS REVENUE DEBIT

## 2020-10-06 PROCEDURE — 3331090001 HH PPS REVENUE CREDIT

## 2020-10-07 ENCOUNTER — HOME CARE VISIT (OUTPATIENT)
Dept: SCHEDULING | Facility: HOME HEALTH | Age: 70
End: 2020-10-07
Payer: MEDICARE

## 2020-10-07 VITALS
DIASTOLIC BLOOD PRESSURE: 70 MMHG | TEMPERATURE: 97.8 F | HEART RATE: 74 BPM | RESPIRATION RATE: 16 BRPM | OXYGEN SATURATION: 97 % | SYSTOLIC BLOOD PRESSURE: 100 MMHG

## 2020-10-07 PROCEDURE — 3331090002 HH PPS REVENUE DEBIT

## 2020-10-07 PROCEDURE — G0300 HHS/HOSPICE OF LPN EA 15 MIN: HCPCS

## 2020-10-07 PROCEDURE — 3331090001 HH PPS REVENUE CREDIT

## 2020-10-08 PROCEDURE — 3331090001 HH PPS REVENUE CREDIT

## 2020-10-08 PROCEDURE — 3331090002 HH PPS REVENUE DEBIT

## 2020-10-09 PROCEDURE — 3331090001 HH PPS REVENUE CREDIT

## 2020-10-09 PROCEDURE — 3331090002 HH PPS REVENUE DEBIT

## 2020-10-10 PROCEDURE — 3331090001 HH PPS REVENUE CREDIT

## 2020-10-10 PROCEDURE — 3331090002 HH PPS REVENUE DEBIT

## 2020-10-11 PROCEDURE — 3331090002 HH PPS REVENUE DEBIT

## 2020-10-11 PROCEDURE — 3331090001 HH PPS REVENUE CREDIT

## 2020-10-12 ENCOUNTER — HOME CARE VISIT (OUTPATIENT)
Dept: SCHEDULING | Facility: HOME HEALTH | Age: 70
End: 2020-10-12
Payer: MEDICARE

## 2020-10-12 PROCEDURE — G0299 HHS/HOSPICE OF RN EA 15 MIN: HCPCS

## 2020-10-12 PROCEDURE — 3331090001 HH PPS REVENUE CREDIT

## 2020-10-12 PROCEDURE — 3331090002 HH PPS REVENUE DEBIT

## 2020-10-13 ENCOUNTER — VIRTUAL VISIT (OUTPATIENT)
Dept: CARDIOTHORACIC SURGERY | Age: 70
End: 2020-10-13
Payer: MEDICARE

## 2020-10-13 VITALS
DIASTOLIC BLOOD PRESSURE: 82 MMHG | OXYGEN SATURATION: 97 % | TEMPERATURE: 98.2 F | SYSTOLIC BLOOD PRESSURE: 120 MMHG | HEART RATE: 80 BPM | RESPIRATION RATE: 14 BRPM

## 2020-10-13 DIAGNOSIS — Z95.1 S/P CABG X 4: Primary | ICD-10-CM

## 2020-10-13 PROCEDURE — 99024 POSTOP FOLLOW-UP VISIT: CPT | Performed by: PHYSICIAN ASSISTANT

## 2020-10-13 NOTE — PROGRESS NOTES
[facility] Progress Note      Pursuant to the emergency declaration under the [facility], [facility] authority and the [employer] and [employer] Act, this Virtual Visit was conducted with patient's (and/or legal guardian's) consent, to reduce the risk of exposure to COVID-19 and provide necessary medical care. Services were provided through a video synchronous discussion virtually to substitute for in-person encounter. Today's date: 10/13/2020    Interval History: This is a 3-week follow-up after discharge on 9/22/2020 following CABG times 49/18/2020. His activity level is essentially back to normal.  He easily goes up and down his 2-1/2 flights of stairs without any significant dyspnea. He has minimal pain but does take A Percocet at night. His diet is normal and his bowels are moving. Has been putting bacitracin on his right lower leg incision. He states that healing up well without any obvious signs of infection. Telehealth vitals reviewed. He has met with his PCP already. He has an upcoming cardiology appointment. Assessment:     Satisfactory progress after CABG x4    Plan:     1. Stop bacitracin to right leg incision. Leave open to air. 2.  Continue sternal precautions for another week. Will be able to drive after that point. Slowly increase lifting weights and movement of arms. 3.  Cleared to go back to work as a  after next week. 4.  Cardiac rehab referral made. 5.  No regular scheduled visits with our service needed. Subjective:     No complaint    Objective:     Admission Weight: Last Weight           Patient-Reported Vitals 10/13/2020   Patient-Reported Weight 157lb   Patient-Reported Pulse 85   Patient-Reported SpO2 97   Patient-Reported Systolic  787   Patient-Reported Diastolic 81        There were no vitals taken for this visit.     BP Readings from Last 3 Encounters:   10/12/20 120/82   10/07/20 100/70   10/06/20 108/62     Wt Readings from Last 3 Encounters:   09/28/20 73.6 kg (162 lb 5 oz)   09/25/20 77.8 kg (171 lb 8 oz)   09/22/20 80.1 kg (176 lb 9.6 oz)       Physical Exam:  General: Well-appearing. No apparent distress. Neck: No JVD or tracheal deviation. Lungs: No signs of respiratory difficulty. Speaks in full sentences. Chest: Midline incision healing very well. Skin edges are approximated. No erythema. No drainage present. Heart: No apparent heave  Abdomen: Not distended. Extremities: No edema. Right leg incision healing well. No erythema. Moist with bacitracin. Neuro: Moves all extremities x4 strong and equal.       Sol Calzada PA-C    PLEASE NOTE:  This document has been produced using voice recognition software. Unrecognized errors in transcription may be present.

## 2020-10-13 NOTE — PROGRESS NOTES
Chief Complaint   Patient presents with    Post OP Follow Up    Coronary Artery Disease     1. Have you been to the ER, urgent care clinic since your last visit? Hospitalized since your last visit? No    2. Have you seen or consulted any other health care providers outside of the [address] [place] [address] since your last visit? Include any pap smears or colon screening. No         Patient denies chest pain/dizziness/shortness of breath/headache      He consents to today's virtual visit with Juan YOUNGBLOOD.

## 2020-10-23 ENCOUNTER — OFFICE VISIT (OUTPATIENT)
Dept: CARDIOLOGY CLINIC | Age: 70
End: 2020-10-23
Payer: MEDICARE

## 2020-10-23 VITALS
OXYGEN SATURATION: 97 % | TEMPERATURE: 98.2 F | HEART RATE: 74 BPM | SYSTOLIC BLOOD PRESSURE: 117 MMHG | WEIGHT: 160 LBS | BODY MASS INDEX: 25.06 KG/M2 | DIASTOLIC BLOOD PRESSURE: 73 MMHG

## 2020-10-23 DIAGNOSIS — I25.10 CORONARY ARTERY DISEASE DUE TO LIPID RICH PLAQUE: Primary | ICD-10-CM

## 2020-10-23 DIAGNOSIS — I25.83 CORONARY ARTERY DISEASE DUE TO LIPID RICH PLAQUE: Primary | ICD-10-CM

## 2020-10-23 DIAGNOSIS — I10 ESSENTIAL HYPERTENSION WITH GOAL BLOOD PRESSURE LESS THAN 140/90: ICD-10-CM

## 2020-10-23 DIAGNOSIS — E78.00 PURE HYPERCHOLESTEROLEMIA: ICD-10-CM

## 2020-10-23 PROCEDURE — G8510 SCR DEP NEG, NO PLAN REQD: HCPCS | Performed by: INTERNAL MEDICINE

## 2020-10-23 PROCEDURE — G8419 CALC BMI OUT NRM PARAM NOF/U: HCPCS | Performed by: INTERNAL MEDICINE

## 2020-10-23 PROCEDURE — G8536 NO DOC ELDER MAL SCRN: HCPCS | Performed by: INTERNAL MEDICINE

## 2020-10-23 PROCEDURE — G8428 CUR MEDS NOT DOCUMENT: HCPCS | Performed by: INTERNAL MEDICINE

## 2020-10-23 PROCEDURE — 99214 OFFICE O/P EST MOD 30 MIN: CPT | Performed by: INTERNAL MEDICINE

## 2020-10-23 RX ORDER — ATORVASTATIN CALCIUM 80 MG/1
80 TABLET, FILM COATED ORAL
Qty: 30 TAB | Refills: 5 | Status: SHIPPED | OUTPATIENT
Start: 2020-10-23 | End: 2021-06-17

## 2020-10-23 NOTE — PROGRESS NOTES
[facility] presents today for   Chief Complaint   Patient presents with   [facility][1] Follow Up       [facility] preferred language for health care discussion is english/other. Personal Protective Equipment:   Personal Protective Equipment was used including: mask-surgical and hands-gloves. Patient was placed on no precaution(s). Patient was masked. Is someone accompanying this pt? No    Is the patient using any DME equipment during OV? No    Depression Screening:  3 most recent PHQ Screens 10/23/2020   Little interest or pleasure in doing things Not at all   Feeling down, depressed, irritable, or hopeless Not at all   Total Score PHQ 2 0       Learning Assessment:  No flowsheet data found. Abuse Screening:  Completed    Fall Risk  Fall Risk Assessment, last 12 mths 10/23/2020   Able to walk? Yes   Fall in past 12 months? No       Pt currently taking Anticoagulant therapy? no    Coordination of Care:  1. Have you been to the ER, urgent care clinic since your last visit? Hospitalized since your last visit? yes    2. Have you seen or consulted any other health care providers outside of the [address] [place] [address] since your last visit? Include any pap smears or colon screening.  no

## 2020-10-23 NOTE — PROGRESS NOTES
[facility]    Mr. Eugenio Cooley is 71-year-old male with a history of CAD status post CABG in 2020, alcohol use, remote history of tobacco abuse disorder, hypertension, hyperlipidemia    Patient is here today for follow-up appointment. He was admitted at [facility] with chest pain. Nuclear stress test was abnormal.  He underwent cardiac catheterization which showed three-vessel CAD. He underwent cardiac catheter[facility] and [facility] He is here today for follow-up appointment but he is feeling much better. He denies any limitation of activity. He denies any palpitation, presyncope or syncope. He is taking all his medications regularly. He has appointment set up with CT surgery team in several weeks. Denies any nausea, vomiting, abdominal pain, fever, chills, sputum production. No hematuria or other bleeding complaints    Past Medical History:   Diagnosis Date    Allergic rhinitis     Asthma     CAD (coronary artery disease)     Diabetes type 2, uncontrolled (HCC)     Last HbA1c 7.6% per Patient in late 8/2020    Enlarged prostate     ETOH abuse     2-3 Beers/day x>30 years    Former smoker     2/3 PPD x4-5 years; Quit ~2002    Generalized anxiety disorder with panic attacks     History of dermatomyositis     Patient doubts this diagnosis    HLD (hyperlipidemia)     Hypertension     NSTEMI (non-ST elevated myocardial infarction) ([facility] [address] 09/02/2020    S/P CABG x 4 09/2020    LIMA to LAD, Separate SVG to OM, RPDA, Diagonal    Statin intolerance     \"Muscular & Neurological Damage\" due to [employer]ins       Review of Systems:  Cardiac symptoms as noted above in HPI. All others negative.   Denies fatigue, malaise, skin rash, joint pain, blurring vision, photophobia, neck pain, hemoptysis, chronic cough, nausea, vomiting, hematuria, burning micturition, BRBPR, chronic headaches. Current Outpatient Medications   Medication Sig    oxyCODONE-acetaminophen (PERCOCET) 5-325 mg per tablet Take 1 Tab by mouth every six (6) hours as needed for Pain.  psyllium (METAMUCIL) powd Take 15 g by mouth daily as needed for Other (constipation).  guaiFENesin (ORGANIDIN) 400 mg tablet Take 400 mg by mouth every eight (8) hours as needed for Congestion.  aspirin delayed-release 81 mg tablet Take 1 Tab by mouth daily.  busPIRone (BUSPAR) 7.5 mg tablet Take 1 Tab by mouth two (2) times a day.  acetaminophen (TYLENOL) 325 mg tablet Take 2 Tabs by mouth every six (6) hours as needed for Pain or Fever.  metFORMIN ER (GLUCOPHAGE XR) 500 mg tablet Take 1 Tab by mouth daily (with dinner).  metoprolol tartrate (LOPRESSOR) 25 mg tablet Take 0.5 Tabs by mouth two (2) times a day.  loratadine (CLARITIN) 10 mg tablet TAKE 1 TABLET BY MOUTH ONCE DAILY AS NEEDED FOR ALLERGIES    zolpidem (AMBIEN) 10 mg tablet TAKE 1 TABLET BY MOUTH AT BEDTIME AS NEEDED FOR SLEEP    atorvastatin (LIPITOR) 80 mg tablet Take 1 Tab by mouth nightly.  lisinopril-hydroCHLOROthiazide (PRINZIDE, ZESTORETIC) 20-25 mg per tablet Take 1 Tab by mouth daily.  isosorbide mononitrate ER (IMDUR) 30 mg tablet Take 30 mg by mouth daily.  docusate sodium (COLACE) 100 mg capsule Take 1 Cap by mouth two (2) times a day for 90 days. No current facility-administered medications for this visit.         Past Surgical History:   Procedure Laterality Date    HX TONSIL AND ADENOIDECTOMY         Allergies and Sensitivities:  No Known Allergies    Family History:  Family History   Problem Relation Age of Onset    Heart Failure Father     Coronary Artery Disease Father 76    Heart Failure Other     Stroke Mother     Coronary Artery Disease Mother 61       Social History:  Social History     Tobacco Use    Smoking status: Former Smoker     Packs/day: 0.67     Years: 4.50     Pack years: 3.01     Last attempt to quit: 2002     Years since quittin.8    Smokeless tobacco: Never Used    Tobacco comment: 2/3 PPD x4-5 years; Quit    Substance Use Topics    Alcohol use: Yes     Alcohol/week: 21.0 - 25.0 standard drinks     Types: 21 - 25 Cans of beer per week     Frequency: 4 or more times a week     Drinks per session: 3 or 4     Binge frequency: Daily or almost daily     Comment: 2-3 Beers/day x30 years    Drug use: Never     He  reports that he quit smoking about 18 years ago. He has a 3.02 pack-year smoking history. He has never used smokeless tobacco.  He  reports current alcohol use of about 21.0 - 25.0 standard drinks of alcohol per week. Physical Exam:  BP Readings from Last 3 Encounters:   10/23/20 117/73   10/12/20 120/82   10/07/20 100/70         Pulse Readings from Last 3 Encounters:   10/23/20 74   10/12/20 80   10/07/20 74          Wt Readings from Last 3 Encounters:   10/23/20 160 lb (72.6 kg)   20 162 lb 5 oz (73.6 kg)   20 171 lb 8 oz (77.8 kg)       Constitutional: Oriented to person, place, and time. HENT: Head: Normocephalic and atraumatic. Neck: No JVD present. Cardiovascular: Regular rhythm. No murmur, gallop or rubs appreciated  Lung: Breath sounds normal. No respiratory distress. No ronchi or rales appreciated  Abdominal: No tenderness. No rebound and no guarding. Musculoskeletal: There is no lower extremity edema. No cynosis  Lymphadenopathy:  No cervical or supraclavicular adenopathy appriciated.      LABS:   Lab Results   Component Value Date/Time    Sodium 140 2020 03:40 AM    Potassium 4.4 2020 03:40 AM    Chloride 110 2020 03:40 AM    CO2 27 2020 03:40 AM    Glucose 78 2020 03:40 AM    BUN 11 2020 03:40 AM    Creatinine 0.70 2020 03:40 AM     Lipids Latest Ref Rng & Units 9/3/2020   Chol, Total <200 MG/   HDL 40 - 60 MG/DL 56   LDL 0 - 100 MG/DL 86.2   Trig <150 MG/(H)   Chol/HDL Ratio 0 - 5.0   3.4   Some recent data might be hidden     Lab Results   Component Value Date/Time    ALT (SGPT) 28 09/03/2020 03:50 AM     Lab Results   Component Value Date/Time    Hemoglobin A1c 7.1 (H) 09/03/2020 03:50 AM     Lab Results   Component Value Date/Time    TSH 1.23 09/03/2020 03:50 AM       EKG    ECHO (09/20)  Left Ventricle  Normal cavity size, wall thickness, systolic function (ejection fraction normal) and diastolic function. Wall motion: normal. The estimated EF is 55 - 60%. Visually measured ejection fraction. Wall Scoring  The left ventricular wall motion is normal.             Left Atrium  Normal cavity size. Left Atrium volume index is 23.08 mL/m2. Right Ventricle  Normal cavity size and global systolic function. Right Atrium  Normal cavity size. Aortic Valve  Trileaflet valve structure, no stenosis and no regurgitation. Moderate aortic valve sclerosis with no evidence of reduced excursion. Mitral Valve  No stenosis and no regurgitation. Mitral valve non-specific thickening. Tricuspid Valve  No stenosis. Non-specific thickening. Trace regurgitation. Pulmonic Valve  Pulmonic valve not well visualized. No stenosis. Trace regurgitation. Aorta  Normal aortic root and ascending aorta. STRESS TEST (09/20)  · Baseline ECG: Normal EKG. · Gated SPECT: Left ventricular function post-stress was normal. Calculated ejection fraction is 77%. · Myocardial perfusion imaging defect 1: There is a defect that is small to moderate in size present in the inferior and inferolateral location(s) that is partially reversible. The defect appears to be infarction with kevin-infarct ischemia. · Myocardial perfusion imaging supports an intermediate risk stress test.    CATHETERIZATION (09/20)  Left Main    Severe calcification of LM and LAD Distal ~ 60-70% disease extending to LAD    Left Anterior Descending    Diffuse prox and mid heavy calcification Diffuse prox and mid 50-75% disease with calcification.  Mid eccentric 50%. Mid-distal 75% stenosis High diagonal: Ostial and proximal 70% D2: ostial and proximal 70%    Left Circumflex    Ostial occlusion Distally filled by collateral from RCA. Right Coronary Artery    Prox-mid calcified and diffuse up to 50-60% lesions. R-L collateral to OM      Left Ventricle  The left ventricular size is normal. LV end diastolic pressure is normal. LV EDP is: 15. The estimated EF = grossly normal. There is no evidence of mitral regurgitation. Aortic Valve  There is no aortic valve stenosis. IMPRESSION & PLAN:  Mr. Concepción Quevedo is 78-year-old male with multiple medical problem    CAD:  Patient had non-STEMI in September 2020. Cardiac catheterization showed three-vessel CAD. Status post CABG with LIMA to LAD, SVG to OM, SVG to diagonal, SVG to PDA in September 2020  No anginal symptoms at this time  Continue aspirin, beta-blocker and statin    Hypertension:  . BP well controlled. Continue current medications    Hyperlipidemia:  Continue lipid-lowering agent. Goal LDL less than 70 is recommended    Recommend cardiac rehabilitation program once okay by CT surgery team    Importance of diet and exercise was discussed with patient. This plan was discussed with patient who is in agreement. Thank you for allowing me to participate in patient care. Please feel free to call me if you have any question or concern. Mahogany Omer MD  Please note: This document has been produced using voice recognition software. Unrecognized errors in transcription may be present.

## 2020-10-26 ENCOUNTER — TELEPHONE (OUTPATIENT)
Dept: CARDIAC REHAB | Age: 70
End: 2020-10-26

## 2020-10-26 NOTE — TELEPHONE ENCOUNTER
Cardiac Rehab called patient and left a message about the program. Additional attempts at contact will be made.     Thank you,  Yahir Campbell

## 2020-11-03 ENCOUNTER — TELEPHONE (OUTPATIENT)
Dept: CARDIAC REHAB | Age: 70
End: 2020-11-03

## 2020-11-03 NOTE — TELEPHONE ENCOUNTER
Cardiac Rehab called patient and left a message about the program. Additional attempts at contact will be made.     Thank you,  Cameron Lara

## 2020-12-15 ENCOUNTER — TELEPHONE (OUTPATIENT)
Dept: CARDIAC REHAB | Age: 70
End: 2020-12-15

## 2020-12-15 NOTE — TELEPHONE ENCOUNTER
Cardiac Rehab called patient and left a message. This is the third message left without response, so no additional attempts at contact will be made.     Thank you,  Rene Degroot

## 2021-05-06 ENCOUNTER — OFFICE VISIT (OUTPATIENT)
Dept: CARDIOLOGY CLINIC | Age: 71
End: 2021-05-06
Payer: MEDICARE

## 2021-05-06 VITALS
TEMPERATURE: 98.4 F | WEIGHT: 170 LBS | HEIGHT: 67 IN | SYSTOLIC BLOOD PRESSURE: 187 MMHG | RESPIRATION RATE: 14 BRPM | HEART RATE: 72 BPM | BODY MASS INDEX: 26.68 KG/M2 | DIASTOLIC BLOOD PRESSURE: 103 MMHG | OXYGEN SATURATION: 98 %

## 2021-05-06 DIAGNOSIS — I25.10 CORONARY ARTERY DISEASE DUE TO LIPID RICH PLAQUE: ICD-10-CM

## 2021-05-06 DIAGNOSIS — I25.83 CORONARY ARTERY DISEASE DUE TO LIPID RICH PLAQUE: ICD-10-CM

## 2021-05-06 DIAGNOSIS — R06.09 DOE (DYSPNEA ON EXERTION): Primary | ICD-10-CM

## 2021-05-06 DIAGNOSIS — I10 ESSENTIAL HYPERTENSION WITH GOAL BLOOD PRESSURE LESS THAN 140/90: ICD-10-CM

## 2021-05-06 DIAGNOSIS — I25.10 CORONARY ARTERY DISEASE WITHOUT ANGINA PECTORIS, UNSPECIFIED VESSEL OR LESION TYPE, UNSPECIFIED WHETHER NATIVE OR TRANSPLANTED HEART: ICD-10-CM

## 2021-05-06 PROCEDURE — G8428 CUR MEDS NOT DOCUMENT: HCPCS | Performed by: INTERNAL MEDICINE

## 2021-05-06 PROCEDURE — G8755 DIAS BP > OR = 90: HCPCS | Performed by: INTERNAL MEDICINE

## 2021-05-06 PROCEDURE — G8536 NO DOC ELDER MAL SCRN: HCPCS | Performed by: INTERNAL MEDICINE

## 2021-05-06 PROCEDURE — 3017F COLORECTAL CA SCREEN DOC REV: CPT | Performed by: INTERNAL MEDICINE

## 2021-05-06 PROCEDURE — 99214 OFFICE O/P EST MOD 30 MIN: CPT | Performed by: INTERNAL MEDICINE

## 2021-05-06 PROCEDURE — G8510 SCR DEP NEG, NO PLAN REQD: HCPCS | Performed by: INTERNAL MEDICINE

## 2021-05-06 PROCEDURE — G8419 CALC BMI OUT NRM PARAM NOF/U: HCPCS | Performed by: INTERNAL MEDICINE

## 2021-05-06 PROCEDURE — G8753 SYS BP > OR = 140: HCPCS | Performed by: INTERNAL MEDICINE

## 2021-05-06 PROCEDURE — 1101F PT FALLS ASSESS-DOCD LE1/YR: CPT | Performed by: INTERNAL MEDICINE

## 2021-05-06 RX ORDER — LISINOPRIL AND HYDROCHLOROTHIAZIDE 20; 25 MG/1; MG/1
1 TABLET ORAL DAILY
Qty: 90 TAB | Refills: 3 | Status: SHIPPED | OUTPATIENT
Start: 2021-05-06

## 2021-05-06 RX ORDER — LISINOPRIL AND HYDROCHLOROTHIAZIDE 20; 25 MG/1; MG/1
TABLET ORAL DAILY
COMMUNITY
End: 2021-05-06 | Stop reason: SDUPTHER

## 2021-05-06 NOTE — LETTER
5/6/2021 Patient: Chance Harvey YOB: 1950 Date of Visit: 5/6/2021 Bo Kaur MD 
[address] [place] [id number] Via Fax: [phone] Dear Bo Kaur MD, Thank you for referring Mr. Chance Harvey to CARDIO SPECIALIST AT [facility] for evaluation. My notes for this consultation are attached. If you have questions, please do not hesitate to call me. I look forward to following your patient along with you. Sincerely, Jeffrey Li MD

## 2021-05-06 NOTE — PATIENT INSTRUCTIONS
Testing Echo 
 
 
**call office 3-5 days after testing is completed for results** New Medication/Medication Changes START LISINOPRIL-HCTZ 20-25 MG A DAY 
 
**please allow 24-48 hrs for medication to be escribed to pharmacy** If you need any refills on medications please contact your pharmacy so that the request can be escribed to the provider for review.

## 2021-06-04 ENCOUNTER — TELEPHONE (OUTPATIENT)
Dept: CARDIOLOGY CLINIC | Age: 71
End: 2021-06-04

## 2021-06-07 NOTE — TELEPHONE ENCOUNTER
Contacted pt at [facility] number. Two patient Identifiers confirmed. Advised pt per Dr Gilbert Daniel. Pt verbalized understanding and scheduled for June 17, 2021 for an appt.

## 2021-06-17 ENCOUNTER — OFFICE VISIT (OUTPATIENT)
Dept: CARDIOLOGY CLINIC | Age: 71
End: 2021-06-17
Payer: MEDICARE

## 2021-06-17 VITALS
BODY MASS INDEX: 26.16 KG/M2 | HEART RATE: 75 BPM | WEIGHT: 167 LBS | RESPIRATION RATE: 16 BRPM | DIASTOLIC BLOOD PRESSURE: 72 MMHG | OXYGEN SATURATION: 96 % | TEMPERATURE: 98.5 F | SYSTOLIC BLOOD PRESSURE: 122 MMHG

## 2021-06-17 DIAGNOSIS — I42.8 OTHER CARDIOMYOPATHY (HCC): ICD-10-CM

## 2021-06-17 DIAGNOSIS — I25.10 CORONARY ARTERY DISEASE DUE TO LIPID RICH PLAQUE: ICD-10-CM

## 2021-06-17 DIAGNOSIS — I10 ESSENTIAL HYPERTENSION WITH GOAL BLOOD PRESSURE LESS THAN 140/90: Primary | ICD-10-CM

## 2021-06-17 DIAGNOSIS — I25.83 CORONARY ARTERY DISEASE DUE TO LIPID RICH PLAQUE: ICD-10-CM

## 2021-06-17 DIAGNOSIS — E78.00 PURE HYPERCHOLESTEROLEMIA: ICD-10-CM

## 2021-06-17 PROCEDURE — G8752 SYS BP LESS 140: HCPCS | Performed by: INTERNAL MEDICINE

## 2021-06-17 PROCEDURE — G8428 CUR MEDS NOT DOCUMENT: HCPCS | Performed by: INTERNAL MEDICINE

## 2021-06-17 PROCEDURE — 1101F PT FALLS ASSESS-DOCD LE1/YR: CPT | Performed by: INTERNAL MEDICINE

## 2021-06-17 PROCEDURE — G8510 SCR DEP NEG, NO PLAN REQD: HCPCS | Performed by: INTERNAL MEDICINE

## 2021-06-17 PROCEDURE — G8419 CALC BMI OUT NRM PARAM NOF/U: HCPCS | Performed by: INTERNAL MEDICINE

## 2021-06-17 PROCEDURE — 3017F COLORECTAL CA SCREEN DOC REV: CPT | Performed by: INTERNAL MEDICINE

## 2021-06-17 PROCEDURE — G8754 DIAS BP LESS 90: HCPCS | Performed by: INTERNAL MEDICINE

## 2021-06-17 PROCEDURE — G8536 NO DOC ELDER MAL SCRN: HCPCS | Performed by: INTERNAL MEDICINE

## 2021-06-17 PROCEDURE — 99214 OFFICE O/P EST MOD 30 MIN: CPT | Performed by: INTERNAL MEDICINE

## 2021-06-17 NOTE — PROGRESS NOTES
Mary Wilfred presents today for   Chief Complaint   Patient presents with    Cardiac Testing       Mary Hardin preferred language for health care discussion is english/other. Personal Protective Equipment:   Personal Protective Equipment was used including: mask-surgical and hands-gloves. Patient was placed on no precaution(s). Patient was masked. Precautions:   Patient currently on None  Patient currently roomed with door closed    Is someone accompanying this pt? no    Is the patient using any DME equipment during [address]? no    Depression Screening:  3 most recent PHQ Screens 6/17/2021   Little interest or pleasure in doing things Not at all   Feeling down, depressed, irritable, or hopeless Not at all   Total Score PHQ 2 0       Learning Assessment:  No flowsheet data found. Abuse Screening:  Abuse Screening Questionnaire 10/23/2020   Do you ever feel afraid of your partner? N   Are you in a relationship with someone who physically or mentally threatens you? N   Is it safe for you to go home? Y       Fall Risk  Fall Risk Assessment, last 12 mths 6/17/2021   Able to walk? Yes   Fall in past 12 months? 0   Do you feel unsteady? 0   Are you worried about falling 0       Pt currently taking Anticoagulant therapy? no    Coordination of Care:  1. Have you been to the ER, urgent care clinic since your last visit? Hospitalized since your last visit? no    2. Have you seen or consulted any other health care providers outside of the [address] [place] [address] since your last visit? Include any pap smears or colon screening.  no

## 2021-06-17 NOTE — PATIENT INSTRUCTIONS
New Medication/Medication Changes  Stop lipitor  Start livelo 2 mg    **please allow 24-48 hrs for medication to be escribed to pharmacy** If you need any refills on medications please contact your pharmacy so that the request can be escribed to the provider for review.

## 2021-08-03 PROBLEM — I25.10 CAD (CORONARY ARTERY DISEASE): Status: RESOLVED | Noted: 2020-09-18 | Resolved: 2021-08-03

## 2023-04-27 NOTE — PROGRESS NOTES
[facility]    Mr. Navi Mason is 80 y.o. male with a history of CAD status post CABG in 2020, alcohol use, remote history of tobacco abuse disorder, hypertension, hyperlipidemia    Patient is here today for follow-up appointment. He denies any symptoms to suggest angina. He is walking 1-2 mile on a daily basis without any symptoms to suggest angina or heart failure  Patient stopped taking Lipitor because of significant myalgia. He denies any presyncope or syncope. Denies any nausea, vomiting, abdominal pain, fever, chills, sputum production. No hematuria or other bleeding complaints    Past Medical History:   Diagnosis Date    Allergic rhinitis     Asthma     CAD (coronary artery disease)     Diabetes type 2, uncontrolled ([facility])     Last HbA1c 7.6% per Patient in late 8/2020    Enlarged prostate     ETOH abuse     2-3 Beers/day x>30 years    Former smoker     2/3 PPD x4-5 years; Quit ~2002    Generalized anxiety disorder with panic attacks     History of dermatomyositis     Patient doubts this diagnosis    HLD (hyperlipidemia)     Hypertension     NSTEMI (non-ST elevated myocardial infarction) ([facility] [address] 09/02/2020    S/P CABG x 4 09/2020    LIMA to LAD, Separate SVG to OM, RPDA, Diagonal    Statin intolerance     \"Muscular & Neurological Damage\" due to Statins       Review of Systems:  Cardiac symptoms as noted above in HPI. All others negative. Current Outpatient Medications   Medication Sig    lisinopril-hydroCHLOROthiazide (PRINZIDE, ZESTORETIC) 20-25 mg per tablet Take 1 Tab by mouth daily.  busPIRone (BUSPAR) 7.5 mg tablet Take 1 Tab by mouth two (2) times a day.  metoprolol tartrate (LOPRESSOR) 25 mg tablet Take 0.5 Tabs by mouth two (2) times a day.     loratadine (CLARITIN) 10 mg tablet TAKE 1 TABLET BY MOUTH ONCE DAILY AS NEEDED FOR ALLERGIES    aspirin-acetaminophen-caffeine (EXCEDRIN ES) 250-250-65 mg per tablet Take 1 Tab by mouth.  atorvastatin (LIPITOR) 80 mg tablet Take 1 Tab by mouth nightly. (Patient not taking: Reported on 2021)    oxyCODONE-acetaminophen (PERCOCET) 5-325 mg per tablet Take 1 Tab by mouth every six (6) hours as needed for Pain.  psyllium (METAMUCIL) powd Take 15 g by mouth daily as needed for Other (constipation).  guaiFENesin (ORGANIDIN) 400 mg tablet Take 400 mg by mouth every eight (8) hours as needed for Congestion.  acetaminophen (TYLENOL) 325 mg tablet Take 2 Tabs by mouth every six (6) hours as needed for Pain or Fever.  metFORMIN ER (GLUCOPHAGE XR) 500 mg tablet Take 1 Tab by mouth daily (with dinner).  zolpidem (AMBIEN) 10 mg tablet TAKE 1 TABLET BY MOUTH AT BEDTIME AS NEEDED FOR SLEEP     No current facility-administered medications for this visit. Past Surgical History:   Procedure Laterality Date    HX TONSIL AND ADENOIDECTOMY         Allergies and Sensitivities:  No Known Allergies    Family History:  Family History   Problem Relation Age of Onset    Heart Failure Father     Coronary Artery Disease Father 76    Heart Failure Other     Stroke Mother     Coronary Artery Disease Mother 61       Social History:  Social History     Tobacco Use    Smoking status: Former Smoker     Packs/day: 0.67     Years: 4.50     Pack years: 3.01     Quit date:      Years since quittin.4    Smokeless tobacco: Never Used    Tobacco comment: 2/3 PPD x4-5 years; Quit    Substance Use Topics    Alcohol use: Yes     Alcohol/week: 21.0 - 25.0 standard drinks     Types: 21 - 25 Cans of beer per week     Comment: 2-3 Beers/day x30 years    Drug use: Never     He  reports that he quit smoking about 19 years ago. He has a 3.02 pack-year smoking history. He has never used smokeless tobacco.  He  reports current alcohol use of about 21.0 - 25.0 standard drinks of alcohol per week.     Physical Exam:  BP Readings from Last 3 Encounters:   21 (!) 142/78 06/01/21 133/84   05/06/21 (!) 187/103         Pulse Readings from Last 3 Encounters:   06/17/21 79   05/06/21 72   10/23/20 74          Wt Readings from Last 3 Encounters:   06/17/21 75.8 kg (167 lb)   06/01/21 77.1 kg (170 lb)   05/06/21 77.1 kg (170 lb)       Constitutional: Oriented to person, place, and time. HENT: Head: Normocephalic and atraumatic. Neck: No JVD present. Cardiovascular: Regular rhythm. No murmur, gallop or rubs appreciated  Lung: Breath sounds normal. No respiratory distress. No ronchi or rales appreciated  Abdominal: No tenderness. No rebound and no guarding. Musculoskeletal: There is no lower extremity edema. No cynosis  Lymphadenopathy:  No cervical or supraclavicular adenopathy appriciated. LABS:   Lab Results   Component Value Date/Time    Sodium 140 09/20/2020 03:40 AM    Potassium 4.4 09/20/2020 03:40 AM    Chloride 110 09/20/2020 03:40 AM    CO2 27 09/20/2020 03:40 AM    Glucose 78 09/20/2020 03:40 AM    BUN 11 09/20/2020 03:40 AM    Creatinine 0.70 09/20/2020 03:40 AM     Lipids Latest Ref Rng & Units 9/3/2020   Chol, Total <200 MG/   HDL 40 - 60 MG/DL 56   LDL 0 - 100 MG/DL 86.2   Trig <150 MG/(H)   Chol/HDL Ratio 0 - 5.0   3.4   Some recent data might be hidden     Lab Results   Component Value Date/Time    ALT (SGPT) 28 09/03/2020 03:50 AM     Lab Results   Component Value Date/Time    Hemoglobin A1c 7.1 (H) 09/03/2020 03:50 AM     Lab Results   Component Value Date/Time    TSH 1.23 09/03/2020 03:50 AM       EKG    ECHO (09/20)  Left Ventricle  Normal cavity size, wall thickness, systolic function (ejection fraction normal) and diastolic function. Wall motion: normal. The estimated EF is 55 - 60%. Visually measured ejection fraction. Wall Scoring  The left ventricular wall motion is normal.             Left Atrium  Normal cavity size. Left Atrium volume index is 23.08 mL/m2. Right Ventricle  Normal cavity size and global systolic function. Right Atrium  Normal cavity size. Aortic Valve  Trileaflet valve structure, no stenosis and no regurgitation. Moderate aortic valve sclerosis with no evidence of reduced excursion. Mitral Valve  No stenosis and no regurgitation. Mitral valve non-specific thickening. Tricuspid Valve  No stenosis. Non-specific thickening. Trace regurgitation. Pulmonic Valve  Pulmonic valve not well visualized. No stenosis. Trace regurgitation. Aorta  Normal aortic root and ascending aorta. STRESS TEST (09/20)  · Baseline ECG: Normal EKG. · Gated SPECT: Left ventricular function post-stress was normal. Calculated ejection fraction is 77%. · Myocardial perfusion imaging defect 1: There is a defect that is small to moderate in size present in the inferior and inferolateral location(s) that is partially reversible. The defect appears to be infarction with kevin-infarct ischemia. · Myocardial perfusion imaging supports an intermediate risk stress test.    CATHETERIZATION (09/20)  Left Main    Severe calcification of LM and LAD Distal ~ 60-70% disease extending to LAD    Left Anterior Descending    Diffuse prox and mid heavy calcification Diffuse prox and mid 50-75% disease with calcification. Mid eccentric 50%. Mid-distal 75% stenosis High diagonal: Ostial and proximal 70% D2: ostial and proximal 70%    Left Circumflex    Ostial occlusion Distally filled by collateral from RCA. Right Coronary Artery    Prox-mid calcified and diffuse up to 50-60% lesions. R-L collateral to OM      Left Ventricle  The left ventricular size is normal. LV end diastolic pressure is normal. LV EDP is: 15. The estimated EF = grossly normal. There is no evidence of mitral regurgitation. Aortic Valve  There is no aortic valve stenosis. IMPRESSION & PLAN:  Mr. Brian Hobbs is 80 y.o. male with multiple medical problem    CAD:  Patient had non-STEMI in September 2020. Cardiac catheterization showed three-vessel CAD.   Status post CABG with LIMA to LAD, SVG to OM, SVG to diagonal, SVG to PDA in September 2020  No anginal symptoms at this time. Denies any use of nitroglycerin  Continue aspirin, beta-blocker     Hypertension:  BP is 142/78. Currently on metoprolol, lisinopril and hydrochlorothiazide  We will order echo to rule out hypertensive cardiovascular heart disease with this elevated blood pressure. Cardiomyopathy:  LVEF 45-50% by echocardiogram in 06/2021  Currently on metoprolol, lisinopril and hydrochlorothiazide. Recommend to continue same  No evidence of fluid overload on exam    Hyperlipidemia:  Patient used to be on Lipitor however because of significant myalgia and history of intolerance to statin he stopped using it. I will try Livalo which supposedly has lower myalgia side effect. If he cannot tolerate that, will need to consider Zetia and PCSK9 inhibitor    This plan was discussed with patient who is in agreement. Thank you for allowing me to participate in patient care. Please feel free to call me if you have any question or concern. Eloise Rosario MD  Please note: This document has been produced using voice recognition software. Unrecognized errors in transcription may be present. None

## (undated) DEVICE — BLANKET WRM AD W50XL85.8IN PACU FULL BODY FORC AIR

## (undated) DEVICE — SURGICAL PROCEDURE KIT LT HRT CUST

## (undated) DEVICE — CATHETER ART 20 GAX4 CM 22 GA RADIAL FEP

## (undated) DEVICE — BRUSH SCRB 4% CHG RED DISP --

## (undated) DEVICE — ADAPTER CARDPLG SET MGMT FEM LUER W/ CLR CODE CLMP DLP

## (undated) DEVICE — STERILE POLYISOPRENE POWDER-FREE SURGICAL GLOVES: Brand: PROTEXIS

## (undated) DEVICE — PREP SKN CHLRAPRP APL 26ML STR --

## (undated) DEVICE — DR LANCEY ON PUMP PACK: Brand: MEDLINE INDUSTRIES, INC.

## (undated) DEVICE — RADIFOCUS OPTITORQUE ANGIOGRAPHIC CATHETER: Brand: OPTITORQUE

## (undated) DEVICE — SYR 50ML LR LCK 1ML GRAD NSAF --

## (undated) DEVICE — STANDARD SURGICAL GOWN, L: Brand: CONVERTORS

## (undated) DEVICE — SUTURE VCRL SZ 1 L36IN ABSRB UD CTX L48MM 1/2 CIR J977H

## (undated) DEVICE — SUTURE PERMAHAND SZ 2-0 L18IN NONABSORBABLE BLK L26MM SH C012D

## (undated) DEVICE — GLIDESHEATH SLENDER STAINLESS STEEL KIT: Brand: GLIDESHEATH SLENDER

## (undated) DEVICE — CATH FOL URETH C CARE MTR 16FR -- LUBRI-SIL

## (undated) DEVICE — SOLUTION IV 1000ML 0.9% SOD CHL

## (undated) DEVICE — SET ANGIO L6.5IN L BOR 3 W STPCOCK SPIK TBNG

## (undated) DEVICE — GARMENT,MEDLINE,DVT,SEQUENTIAL,CALF,MD: Brand: MEDLINE

## (undated) DEVICE — Y BARBED CONNECTOR POLYPROPYLENE, LARGE: Brand: ARGYLE

## (undated) DEVICE — ARGYLE FRAZIER SURGICAL SUCTION INSTRUMENT 8 FR/CH (2.7 MM): Brand: ARGYLE

## (undated) DEVICE — SUTURE PROL SZ 7-0 L24IN NONABSORBABLE BLU L8MM BV175-6 3/8 8735H

## (undated) DEVICE — TOWEL SURG W16XL26IN WHT NONFENESTRATED ST 4 PER PK

## (undated) DEVICE — CATHETER THOR 28FR L23CM DIA9.3MM POLYVI CHL TAPR CONN TIP

## (undated) DEVICE — SUTURE PROL SZ 4-0 L36IN NONABSORBABLE BLU L26MM SH 1/2 CIR 8521H

## (undated) DEVICE — SUTURE PERMAHAND SZ 4-0 L12X30IN NONABSORBABLE BLK SILK A303H

## (undated) DEVICE — SUTURE PROL SZ 7-0 L30IN NONABSORBABLE BLU L9.3MM BV-1 1/2 8703H

## (undated) DEVICE — SUT PROL 4-0 36IN RB1 DA BLU --

## (undated) DEVICE — EZ GLIDE AORTIC CANNULA: Brand: EDWARDS LIFESCIENCES EZ GLIDE AORTIC CANNULA

## (undated) DEVICE — SYR 10ML SLIP TIP 1/5ML GRAD --

## (undated) DEVICE — AVID DUAL STAGE VENOUS DRAINAGE CANNULA: Brand: AVID DUAL STAGE VENOUS DRAINAGE CANNULA

## (undated) DEVICE — SUT SLK 0 30IN SH BLK --

## (undated) DEVICE — PENCIL ES L3M BTTN SWCH S STL HEX LOK BLDE ELECTRD HOLSTER

## (undated) DEVICE — GAUZE,SPONGE,4"X4",16PLY,STRL,LF,10/TRAY: Brand: MEDLINE

## (undated) DEVICE — DECANTER BAG 9": Brand: MEDLINE INDUSTRIES, INC.

## (undated) DEVICE — SUT PROL 6-0 30IN C1 DA BLU --

## (undated) DEVICE — NEEDLE HYPO 25GA L0.625IN BLU POLYPR HUB S STL REG BVL STR

## (undated) DEVICE — SUTURE SZ 7 L18IN NONABSORBABLE SIL CCS L48MM 1/2 CIR STRNM M655G

## (undated) DEVICE — GOWN,SIRUS,NONRNF,SETINSLV,XL,20/CS: Brand: MEDLINE

## (undated) DEVICE — FOGARTY SPRING CLIPS 6MM: Brand: FOGARTY SOFTJAW

## (undated) DEVICE — GLOVE SURG SZ 8 L11.77IN FNGR THK9.8MIL STRW LTX POLYMER

## (undated) DEVICE — FLEX ADVANTAGE 1500CC: Brand: FLEX ADVANTAGE

## (undated) DEVICE — PROBE VASC 8MHZ WTRPRF

## (undated) DEVICE — SUTURE VCRL SZ 0 L36IN ABSRB UD L36MM CT-1 1/2 CIR J946H

## (undated) DEVICE — PRESSURE MONITORING SET: Brand: TRUWAVE

## (undated) DEVICE — GOWN,NON-REINFORCED,3XL: Brand: MEDLINE

## (undated) DEVICE — SYS VSL HARV HEMOPRO2 VASOVIEW -- HARV SYS MINIMUM ORDER 5/EA

## (undated) DEVICE — SYR LR LCK 1ML GRAD NSAF 30ML --

## (undated) DEVICE — INSERT SUT HLD F/OCTBS RETRCTR --

## (undated) DEVICE — SUTURE ABSORBABLE BRAIDED 2-0 CT-1 27 IN UD VICRYL J259H

## (undated) DEVICE — X-RAY SPONGES,12 PLY: Brand: DERMACEA

## (undated) DEVICE — HEART II: Brand: MEDLINE INDUSTRIES, INC.

## (undated) DEVICE — SUTURE MCRYL SZ 4 0 L18IN ABSRB VLT PS 1 L24MM 3 8 CIR REV Y682H

## (undated) DEVICE — CABLE PACE ALGTR CLP SAF 12FT --

## (undated) DEVICE — 3M™ BAIR HUGGER® CARDIAC ACCESS BLANKET, 5 PER CASE 63000: Brand: BAIR HUGGER™

## (undated) DEVICE — BAND RADIAL COMPR ARTERY 24CM -- REG BX/10

## (undated) DEVICE — Device: Brand: RETRACT-O-TAPE 18G X 30.5CM BLUNT NEEDLE

## (undated) DEVICE — SPONGE HEMOSTAT CELLULS 4X8IN -- SURGICEL

## (undated) DEVICE — Device

## (undated) DEVICE — SUT SLK 0 30IN FSL BLK --

## (undated) DEVICE — SPONGE DRAIN NONWOVEN 4X4IN -- 2/PK

## (undated) DEVICE — ROTATING SURGICAL PUNCHES, 1 PER POUCH: Brand: A&E MEDICAL / ROTATING SURGICAL PUNCHES

## (undated) DEVICE — SUT PROL 4-0 30IN SH1 DA BLU --

## (undated) DEVICE — ZINACTIVE USE 2641837 CLIP LIG M BLU TI HRT SHP WIRE HORZ 600 PER BX

## (undated) DEVICE — NDL PRT INJ NSAF BLNT 18GX1.5 --

## (undated) DEVICE — REM POLYHESIVE ADULT PATIENT RETURN ELECTRODE: Brand: VALLEYLAB

## (undated) DEVICE — SUTURE ETHBND EXCEL SZ 2-0 L36IN NONABSORBABLE GRN SH-1 X763H

## (undated) DEVICE — WAX SURG 2.5GM HEMSTAT BNE BEESWAX PARAFFIN ISO PALMITATE

## (undated) DEVICE — SUTURE VCRL SZ 3-0 L27IN ABSRB UD L26MM SH 1/2 CIR J416H